# Patient Record
Sex: FEMALE | Race: WHITE | NOT HISPANIC OR LATINO | Employment: OTHER | URBAN - NONMETROPOLITAN AREA
[De-identification: names, ages, dates, MRNs, and addresses within clinical notes are randomized per-mention and may not be internally consistent; named-entity substitution may affect disease eponyms.]

---

## 2017-01-25 RX ORDER — PROMETHAZINE HYDROCHLORIDE 25 MG/1
TABLET ORAL
Qty: 30 TABLET | Refills: 0 | Status: SHIPPED | OUTPATIENT
Start: 2017-01-25 | End: 2017-06-14 | Stop reason: SDUPTHER

## 2017-02-14 ENCOUNTER — OFFICE VISIT (OUTPATIENT)
Dept: FAMILY MEDICINE CLINIC | Facility: CLINIC | Age: 77
End: 2017-02-14

## 2017-02-14 VITALS
WEIGHT: 162.6 LBS | DIASTOLIC BLOOD PRESSURE: 62 MMHG | SYSTOLIC BLOOD PRESSURE: 136 MMHG | OXYGEN SATURATION: 97 % | HEART RATE: 81 BPM | TEMPERATURE: 97.5 F | HEIGHT: 66 IN | BODY MASS INDEX: 26.13 KG/M2

## 2017-02-14 DIAGNOSIS — G47.00 INSOMNIA, UNSPECIFIED TYPE: ICD-10-CM

## 2017-02-14 DIAGNOSIS — Z87.898 HISTORY OF FATIGUE: ICD-10-CM

## 2017-02-14 DIAGNOSIS — E78.00 PURE HYPERCHOLESTEROLEMIA: Primary | ICD-10-CM

## 2017-02-14 DIAGNOSIS — R11.0 NAUSEA: ICD-10-CM

## 2017-02-14 DIAGNOSIS — L82.1 SK (SEBORRHEIC KERATOSIS): ICD-10-CM

## 2017-02-14 PROCEDURE — 90732 PPSV23 VACC 2 YRS+ SUBQ/IM: CPT | Performed by: FAMILY MEDICINE

## 2017-02-14 PROCEDURE — 80053 COMPREHEN METABOLIC PANEL: CPT | Performed by: FAMILY MEDICINE

## 2017-02-14 PROCEDURE — 80061 LIPID PANEL: CPT | Performed by: FAMILY MEDICINE

## 2017-02-14 PROCEDURE — 85027 COMPLETE CBC AUTOMATED: CPT | Performed by: FAMILY MEDICINE

## 2017-02-14 PROCEDURE — 90471 IMMUNIZATION ADMIN: CPT | Performed by: FAMILY MEDICINE

## 2017-02-14 PROCEDURE — 84443 ASSAY THYROID STIM HORMONE: CPT | Performed by: FAMILY MEDICINE

## 2017-02-14 PROCEDURE — 99214 OFFICE O/P EST MOD 30 MIN: CPT | Performed by: FAMILY MEDICINE

## 2017-02-15 LAB
ALBUMIN SERPL-MCNC: 3.9 G/DL (ref 3.4–4.8)
ALBUMIN/GLOB SERPL: 1.3 G/DL (ref 1.1–1.8)
ALP SERPL-CCNC: 71 U/L (ref 38–126)
ALT SERPL W P-5'-P-CCNC: 26 U/L (ref 9–52)
ANION GAP SERPL CALCULATED.3IONS-SCNC: 10 MMOL/L (ref 5–15)
ARTICHOKE IGE QN: 132 MG/DL (ref 1–129)
AST SERPL-CCNC: 17 U/L (ref 14–36)
BILIRUB SERPL-MCNC: 0.3 MG/DL (ref 0.2–1.3)
BUN BLD-MCNC: 15 MG/DL (ref 7–21)
BUN/CREAT SERPL: 17.2 (ref 7–25)
CALCIUM SPEC-SCNC: 9.3 MG/DL (ref 8.4–10.2)
CHLORIDE SERPL-SCNC: 100 MMOL/L (ref 95–110)
CHOLEST SERPL-MCNC: 234 MG/DL (ref 0–199)
CO2 SERPL-SCNC: 31 MMOL/L (ref 22–31)
CREAT BLD-MCNC: 0.87 MG/DL (ref 0.5–1)
DEPRECATED RDW RBC AUTO: 40.1 FL (ref 36.4–46.3)
ERYTHROCYTE [DISTWIDTH] IN BLOOD BY AUTOMATED COUNT: 12.5 % (ref 11.5–14.5)
GFR SERPL CREATININE-BSD FRML MDRD: 63 ML/MIN/1.73 (ref 39–90)
GLOBULIN UR ELPH-MCNC: 3.1 GM/DL (ref 2.3–3.5)
GLUCOSE BLD-MCNC: 108 MG/DL (ref 60–100)
HCT VFR BLD AUTO: 40.6 % (ref 35–45)
HDLC SERPL-MCNC: 51 MG/DL (ref 60–200)
HGB BLD-MCNC: 13.3 G/DL (ref 12–15.5)
LDLC/HDLC SERPL: 3 {RATIO} (ref 0–3.22)
MCH RBC QN AUTO: 28.7 PG (ref 26.5–34)
MCHC RBC AUTO-ENTMCNC: 32.8 G/DL (ref 31.4–36)
MCV RBC AUTO: 87.7 FL (ref 80–98)
PLATELET # BLD AUTO: 249 10*3/MM3 (ref 150–450)
PMV BLD AUTO: 10.4 FL (ref 8–12)
POTASSIUM BLD-SCNC: 4.6 MMOL/L (ref 3.5–5.1)
PROT SERPL-MCNC: 7 G/DL (ref 6.3–8.6)
RBC # BLD AUTO: 4.63 10*6/MM3 (ref 3.77–5.16)
SODIUM BLD-SCNC: 141 MMOL/L (ref 137–145)
TRIGL SERPL-MCNC: 151 MG/DL (ref 20–199)
TSH SERPL DL<=0.05 MIU/L-ACNC: 3.33 MIU/ML (ref 0.46–4.68)
WBC NRBC COR # BLD: 6.92 10*3/MM3 (ref 3.2–9.8)

## 2017-02-15 NOTE — PROGRESS NOTES
Subjective   Sara Solsi is a 76 y.o. female.     History of Present Illness     Past 2-3 weeks, for example, if she gets up to cook she will get tired.  Not sob, no chest pain, just tired.  Cant attribute anything different over past 2-3 weeks.  She took herself off of amitritpylline.  She stays nauseated.  She has called dr gil alas office and they wont call her back?  Has place left upper leg that has turned dark.   She says she is sleeping pretty good.  Only gets up once to urinate.    Review of Systems   Constitutional: Positive for fatigue. Negative for chills and fever.   HENT: Negative for congestion, ear discharge, ear pain, facial swelling, hearing loss, postnasal drip, rhinorrhea, sinus pressure, sore throat, trouble swallowing and voice change.    Eyes: Negative for discharge, redness and visual disturbance.   Respiratory: Negative for cough, chest tightness, shortness of breath and wheezing.    Cardiovascular: Negative for chest pain and palpitations.   Gastrointestinal: Negative for abdominal pain, blood in stool, constipation, diarrhea, nausea and vomiting.   Endocrine: Negative for polydipsia and polyuria.   Genitourinary: Negative for dysuria, flank pain, hematuria and urgency.   Musculoskeletal: Negative for arthralgias, back pain, joint swelling and myalgias.   Skin: Negative for rash.   Neurological: Negative for dizziness, weakness, numbness and headaches.   Hematological: Negative for adenopathy.   Psychiatric/Behavioral: Negative for confusion and sleep disturbance. The patient is not nervous/anxious.        Objective   Physical Exam   Constitutional: She is oriented to person, place, and time. She appears well-developed and well-nourished.   HENT:   Head: Normocephalic and atraumatic.   Right Ear: External ear normal.   Left Ear: External ear normal.   Nose: Nose normal.   Mouth/Throat: Oropharynx is clear and moist.   Eyes: Conjunctivae and EOM are normal. Pupils are equal,  round, and reactive to light.   Neck: Normal range of motion. Neck supple.   Cardiovascular: Normal rate, regular rhythm and normal heart sounds.  Exam reveals no gallop and no friction rub.    No murmur heard.  Pulmonary/Chest: Effort normal and breath sounds normal.   Abdominal: Soft. Bowel sounds are normal. She exhibits no distension. There is no tenderness. There is no rebound and no guarding.   Musculoskeletal: Normal range of motion. She exhibits no edema or deformity.   Neurological: She is alert and oriented to person, place, and time. No cranial nerve deficit.   Skin: Skin is warm and dry. No rash noted. No erythema.   Stuck on appearing hyperpigmented papule left upper anterior thigh.   Psychiatric: She has a normal mood and affect. Her behavior is normal. Judgment and thought content normal.   Nursing note and vitals reviewed.      Assessment/Plan   Sara was seen today for fatigue and nevus.    Diagnoses and all orders for this visit:    Pure hypercholesterolemia  -     Lipid Panel    Nausea  -     CBC (No Diff)  -     Comprehensive Metabolic Panel  -     TSH    History of fatigue  -     CBC (No Diff)  -     Comprehensive Metabolic Panel  -     Lipid Panel  -     TSH    Insomnia, unspecified type  -     TSH    SK (seborrheic keratosis)    Other orders  -     Pneumococcal Polysaccharide Vaccine 23-Valent Greater Than or Equal To 1yo Subcutaneous / IM      sk is benign, reassurance given  labwork  Marked meds on her list that can cause nausea.

## 2017-03-16 DIAGNOSIS — R42 VERTIGO: Primary | ICD-10-CM

## 2017-03-30 ENCOUNTER — TELEPHONE (OUTPATIENT)
Dept: FAMILY MEDICINE CLINIC | Facility: CLINIC | Age: 77
End: 2017-03-30

## 2017-03-30 RX ORDER — MECLIZINE HYDROCHLORIDE 25 MG/1
25 TABLET ORAL 3 TIMES DAILY PRN
Qty: 90 TABLET | Refills: 11 | Status: SHIPPED | OUTPATIENT
Start: 2017-03-30 | End: 2018-01-03 | Stop reason: SINTOL

## 2017-03-30 NOTE — TELEPHONE ENCOUNTER
----- Message from Pascale Pereira sent at 3/29/2017  4:13 PM CDT -----  REFILL  ANTIVERT 25 MG 1/2-1 TIERNESTO IQBAL

## 2017-04-06 ENCOUNTER — HOSPITAL ENCOUNTER (OUTPATIENT)
Dept: PHYSICAL THERAPY | Facility: HOSPITAL | Age: 77
Setting detail: THERAPIES SERIES
Discharge: HOME | End: 2017-04-06

## 2017-04-06 DIAGNOSIS — R42 VERTIGO: Primary | ICD-10-CM

## 2017-04-06 PROCEDURE — G8978 MOBILITY CURRENT STATUS: HCPCS

## 2017-04-06 PROCEDURE — G8979 MOBILITY GOAL STATUS: HCPCS

## 2017-04-06 PROCEDURE — 97161 PT EVAL LOW COMPLEX 20 MIN: CPT

## 2017-04-06 NOTE — PROGRESS NOTES
Outpatient Physical Therapy Ortho Initial Evaluation  [facility]  Sulaiman Burns, PT  17  3:58 PM       Patient Name: Sara Solis  : 1940  MRN: [id number]  Today's Date: 2017      Visit Date: 2017     Subjective Improvement: N/A       Attendance:   Approved: Awaiting Insurance Auth          MD follow up: Ask next visit           RC date: 17           Patient Active Problem List   Diagnosis   • Low back pain   • Actinic keratosis   • Shoulder pain, left   • Pure hypercholesterolemia   • Nausea   • History of fatigue   • Insomnia        Past Medical History:   Diagnosis Date   • Actinic keratosis    • Allergic rhinitis    • Altered bowel function    • Anxiety state    • Aptyalism    • Attention deficit hyperactivity disorder    • Cough    • Degenerative joint disease involving multiple joints    • Depressive disorder    • Diarrhea    • Disorder of skin    • Fissure in skin    • Foot pain    • Generalized anxiety disorder    • Herpes labialis    • History of bone scan 10/01/2008    DXA BONE DENSITY AXIAL 59132 (2) - Osteopenia of the lumbar spine, which has improved compared to the prior study. Normal bone mineral density of the hips, which has improved compared to the prior study   • History of colonoscopy 2011    Colon endoscopy 54122 (2) - Diverticulosis found in sigmoid colon. Moderate fixation of sigmoid colon. Internal hemorrhoids found in anus.    • History of esophagogastroduodenoscopy 2011    EGD w/ tube 69036 (1) - Normal hypopharynx, GE junction, duodenum, symmetrical & patent pylorus. Normal esophagus. Dilatation performed. Chronic gastritis found in antrum. Biopsy taken.   • History of mammogram 10/01/2008    Mammogram, both breasts (1) - Negative mammogram. Recommend follow-up in 12 months;     • Incontinence of feces    • Increased frequency of urination    • Insomnia    • Irritable bowel syndrome    • Knee pain    • Lipoma of  shoulder    • Low back pain    • Lumbosacral radiculopathy    • Nausea    • Nervous system abnormality     Disorder of the peripheral nervous system     • Osteopenia    • Pain in limb    • Pain in lower limb    • Plantar fasciitis    • Pure hypercholesterolemia    • Restless legs    • Skin lesion     nose   • Spasm    • Spasm of back muscles    • Squamous cell carcinoma of skin    • Stress fracture    • Tear film insufficiency    • Trochanteric bursitis of right hip    • Upper respiratory infection    • Urinary tract infectious disease    • Vertigo         Past Surgical History:   Procedure Laterality Date   • BREAST BIOPSY  12/04/1995    BIOPSY OF BREAST OPEN 73571 (2) - Right,Exscision of mass.Fibroadenoma. Fibrocystic disease, proliferative   • CATARACT EXTRACTION WITH INTRAOCULAR LENS IMPLANT Right 03/07/2006    Remove cataract, insert lens (1) - right   • DILATATION AND CURETTAGE  10/02/1986    D&C (1) - Fractional D&C laparoscopic tubal sterilization. Plus uterine fibroids, approximately 10-12 weeks in size   • FOOT SURGERY  03/20/1990    Foot/toes surgery procedure (1) - Left,Excisional biopsy. Tumor, supposed fibroma, 5th toe   • INJECTION OF MEDICATION  06/08/2015    Kenalog (2) - SERENA Robert   • LIVER BIOPSY  04/26/2000    Needle biopsy of liver (1) - percutaneous liver biopsy;     • OTHER SURGICAL HISTORY  10/17/2002    Chest procedure (2) - Intralesional injection of keloid of chest    • OTHER SURGICAL HISTORY  10/25/2001    Remove axillary lymph nodes (1) - Biopsy, Left axillary adenopathy. 4 lymph nodes with reactive hyperplasia No tumor seen   • OTHER SURGICAL HISTORY  06/17/2013    Biopsy, Each Additional Lesion 82580 (1) - SERENA Robert   • OTHER SURGICAL HISTORY  07/14/2014    Destruction of Premalignant Lesion (1st) 31174 (2)  - SERENA Robert   • OTHER SURGICAL HISTORY  05/10/2016    Drain/Inject Major Joint 02491 (4) - SERENA Robert   • SKIN BIOPSY  07/28/2014    Biopsy of Skin 82430 (2)  - SERENA Robert   • TOTAL ABDOMINAL  HYSTERECTOMY WITH SALPINGO OOPHORECTOMY  09/25/1990    incidental appendectomy.Uterine fibroids       Current Outpatient Prescriptions:   •  aspirin 81 MG chewable tablet, Chew 81 mg Daily., Disp: , Rfl:   •  Calcium Carb-Cholecalciferol (CALCIUM + D3 PO), Take  by mouth Daily., Disp: , Rfl:   •  cholecalciferol (VITAMIN D3) 1000 UNITS tablet, Take 1,000 Units by mouth Daily., Disp: , Rfl:   •  FERROUS SULFATE PO, Take  by mouth Daily., Disp: , Rfl:   •  fexofenadine (ALLEGRA) 180 MG tablet, Take 180 mg by mouth Every Night., Disp: , Rfl:   •  gabapentin (NEURONTIN) 600 MG tablet, 1/2 to one tablet tid prn., Disp: 270 tablet, Rfl: 3  •  ibuprofen (ADVIL,MOTRIN) 600 MG tablet, Take 600 mg by mouth Daily., Disp: , Rfl:   •  Magnesium 100 MG capsule, Take 400 mg by mouth Daily., Disp: , Rfl:   •  meclizine (ANTIVERT) 25 MG tablet, Take 1 tablet by mouth 3 (Three) Times a Day As Needed for dizziness., Disp: 90 tablet, Rfl: 11  •  Multiple Vitamins-Minerals (CENTRUM SILVER PO), Take  by mouth Daily., Disp: , Rfl:   •  omeprazole (PRILOSEC) 20 MG capsule, Take 1 capsule by mouth 2 (Two) Times a Day., Disp: 180 capsule, Rfl: 3  •  pramipexole (MIRAPEX) 0.5 MG tablet, Take  by mouth. Take one tablet 1 hour before bedtime., Disp: , Rfl:   •  promethazine (PHENERGAN) 25 MG tablet, TAKE 1 TABLET BY MOUTH EVERY 6 (SIX) HOURS AS NEEDED FOR NAUSEA OR VOMITING * MAY CAUSE DROWSINESS., Disp: 30 tablet, Rfl: 0  •  traMADol (ULTRAM) 50 MG tablet, Take 1 tablet by mouth Every 8 (Eight) Hours As Needed for moderate pain (4-6)., Disp: 90 tablet, Rfl: 2  •  valACYclovir (VALTREX) 1000 MG tablet, Take 2 tablets by mouth 2 (Two) Times a Day., Disp: 4 tablet, Rfl: 11    No Known Allergies      Visit Dx:     ICD-10-CM ICD-9-CM   1. Vertigo R42 780.4             Patient History       04/06/17 1500          History    Chief Complaint Difficulty Walking;Difficulty with daily activities;Dizziness  -MD      Date Current Problem(s) Began 03/15/17   "-MD      Brief Description of Current Complaint Really dizzy every morning when she gets up, espcially worse in the am and has to hold onto the wall for a while in the am. Reports that when she turns on her R side, feels like her stomach flips, causes pain, but passes real quick. Reports that she cannot focus, almost like she is in a \"daze.\" Been really bad for the last 3 weeks or so but has been going on for sometime. Reports mild dizziness at this time, reports that she can't focus exactly like she wants to at this time. Patient reports that she has had no BP issue at this time.   -MD      Onset Date- PT 4/6/17  -MD      Patient/Caregiver Goals Return to prior level of function;Improve mobility;Relief from dizziness  -MD      Current Tobacco Use Never  -MD      Smoking Status Never  -MD      Patient's Rating of General Health Good  -MD      Occupation/sports/leisure activities Retired. Hobbies: read, crossword puzzle.   -MD      Results of Clinical Tests None recently.   -MD      Pain     Pain Location Back   Has been hurting, but not bad. Has a mild headache today.   -MD      Pain at Present 0  -MD      Pain at Best 0  -MD      Pain at Worst 0  -MD      What Performance Factors Make the Current Problem(s) WORSE? Getting up too fast, worse in the morning.   -MD      What Performance Factors Make the Current Problem(s) BETTER? Rest and time.   -MD      Is your sleep disturbed? Yes  -MD      Is medication used to assist with sleep? No  -MD      Difficulties with ADL's? Cannot do anything too fast at this time.   -MD      Fall Risk Assessment    Any falls in the past year: Yes  -MD      Number of falls reported in the last 12 months 2  -MD      Does patient have a fear of falling No  -MD        User Key  (r) = Recorded By, (t) = Taken By, (c) = Cosigned By    Initials Name Provider Type    MD Sulaiman Burns, PT Physical Therapist                PT Ortho       04/06/17 1500    Subjective Comments    Subjective " Comments Reporst mild dizziness today, not bad, and also has a mild headache.   -MD    Precautions and Contraindications    Precautions/Limitations no known precautions/limitations  -MD    Subjective Pain    Able to rate subjective pain? yes  -MD    Pre-Treatment Pain Level 4  -MD    Subjective Pain Comment Headache  -MD    Posture/Observations    Posture- WNL Posture is WNL  -MD    Posture/Observations Comments Positive Upbeat nystagmus with [place] to R, Epley was performed.   -MD    Sensation    Sensation WNL? WNL  -MD    Special Tests/Palpation    Special Tests/Palpation --   Finger to Nose Test (-)  -MD    Neck    Flexion AROM Deficit 40  -MD    Extension AROM Deficit 40  -MD    Lt Lat Flexion AROM Deficit 22  -MD    Rt Lateral Flexion AROM Deficit 22  -MD    Lt Rotation AROM Deficit 64  -MD    Rt Rotation AROM Deficit 54  -MD    MMT (Manual Muscle Testing)    General MMT Assessment Detail 5/5 B LE grossly throughout.   -MD    Balance Skills Training    Training Strategies (Balance Skills) VOR 1 30 secs, mild dizziness, Ft apart/EC 1 min, Ft tog/EC 20 secs   -MD      User Key  (r) = Recorded By, (t) = Taken By, (c) = Cosigned By    Initials Name Provider Type    MD Sulaiman Burns, PT Physical Therapist                            Therapy Education       04/06/17 1500          Therapy Education    Given HEP;Symptoms/condition management;Posture/body mechanics;Pain management;Fall prevention and home safety  -MD      Program New  -MD      How Provided Verbal;Demonstration  -MD      Provided to Patient  -MD      Level of Understanding Teach back education performed;Verbalized;Demonstrated  -MD        User Key  (r) = Recorded By, (t) = Taken By, (c) = Cosigned By    Initials Name Provider Type    MD Sulaiman Burns, PT Physical Therapist                PT OP Goals       04/06/17 1500       PT Short Term Goals    STG Date to Achieve 04/27/17  -MD     STG 1 EC/ft tog to 30 secs or greater.   -MD     STG 2  Full treatment session, no dizziness noted.   -MD     STG 3 Able to wake up in the am and not have to grab onto something immediately after standing up.   -MD     Long Term Goals    LTG Date to Achieve 04/27/17  -MD     LTG 1 EC/ft tog 1 min or greater.   -MD     LTG 2 Able to go a full day without dizziness.   -MD     LTG 3 I with HEP.   -MD     LTG 4 Able to go a full week without dizziness.   -MD     Time Calculation    PT Goal Re-Cert Due Date 04/27/17   Visits 1/1, MD ask next visit.   -MD       User Key  (r) = Recorded By, (t) = Taken By, (c) = Cosigned By    Initials Name Provider Type    MD Sulaiman Burns, PT Physical Therapist                PT Assessment/Plan       04/06/17 1500       PT Assessment    Functional Limitations Decreased safety during functional activities;Impaired gait;Limitation in home management;Limitations in functional capacity and performance;Limitations in community activities;Performance in leisure activities;Performance in self-care ADL  -MD     Impairments Balance;Gait  -MD     Assessment Comments Patient demos mild vertigo at this time, positive nystagmus noted with R [place]  Gerlach, patient was rolled to the L with Epley, patients veritgo did not change much after treatment but told to monitor closely.   -MD     Rehab Potential Good  -MD     Patient/caregiver participated in establishment of treatment plan and goals Yes  -MD     Patient would benefit from skilled therapy intervention Yes  -MD     PT Plan    PT Frequency 2x/week  -MD     Predicted Duration of Therapy Intervention (days/wks) 6 weeks  -MD     Planned CPT's? PT EVAL LOW COMPLEXITY: 73941;PT RE-EVAL: 65461;PT THER PROC EA 15 MIN: 50048;PT THER ACT EA 15 MIN: 74790;PT MANUAL THERAPY EA 15 MIN: 29370;PT GAIT TRAINING EA 15 MIN: 48564;PT NEUROMUSC RE-EDUCATION EA 15 MIN: 48136;PT AQUATIC THERAPY EA 15 MIN: 99930;PT HOT OR COLD PACK TREAT ALICIA  -MD     Physical Therapy Interventions (Optional Details) balance  training;home exercise program;strengthening;stretching  -MD     PT Plan Comments Epley as needed, balance activities, education, head movement activities, HEP.   -MD       User Key  (r) = Recorded By, (t) = Taken By, (c) = Cosigned By    Initials Name Provider Type    MD Sulaiman Burns, PT Physical Therapist                  Exercises       04/06/17 1500          Subjective Comments    Subjective Comments Reporst mild dizziness today, not bad, and also has a mild headache.   -MD      Subjective Pain    Able to rate subjective pain? yes  -MD      Pre-Treatment Pain Level 4  -MD      Subjective Pain Comment Headache  -MD        User Key  (r) = Recorded By, (t) = Taken By, (c) = Cosigned By    Initials Name Provider Type    MD Sulaiman Burns, PT Physical Therapist           Manual Rx (last 36 hours)      Manual Treatments       04/06/17 1500          Manual Rx 1    Manual Rx 1 Location Epley  -MD      Manual Rx 1 Type Epley Maneuver, Jeremy  Pine to the R, rolled to the L.   -MD      Manual Rx 1 Grade Upbeat noted with Jeremy [place] to the R.   -MD      Manual Rx 1 Duration x1  -MD        User Key  (r) = Recorded By, (t) = Taken By, (c) = Cosigned By    Initials Name Provider Type    MD Sulaiman Burns, PT Physical Therapist                            Time Calculation:   Start Time: 1515  Stop Time: 1550  Time Calculation (min): 35 min     Therapy Charges for Today     Code Description Service Date Service Provider Modifiers Qty    72917275371 HC PT MOBILITY CURRENT 4/6/2017 Sulaiman Burns, PT GP, CK 1    39523484966 HC PT MOBILITY PROJECTED 4/6/2017 Sulaiman Burns, PT GP, CI 1    [id number] HC PT EVAL LOW COMPLEXITY 2 4/6/2017 Sulaiman Burns, PT GP 1          PT G-Codes  Functional Limitation: Mobility: Walking and moving around  Mobility: Walking and Moving Around Current Status (): At least 40 percent but less than 60 percent impaired, limited or restricted  Mobility: Walking and Moving Around  Goal Status (): At least 1 percent but less than 20 percent impaired, limited or restricted         Sulaiman Burns, PT  4/6/2017

## 2017-04-10 ENCOUNTER — HOSPITAL ENCOUNTER (OUTPATIENT)
Dept: PHYSICAL THERAPY | Facility: HOSPITAL | Age: 77
Setting detail: THERAPIES SERIES
Discharge: HOME | End: 2017-04-10

## 2017-04-10 DIAGNOSIS — R42 VERTIGO: Primary | ICD-10-CM

## 2017-04-10 PROCEDURE — 97110 THERAPEUTIC EXERCISES: CPT

## 2017-04-10 NOTE — PROGRESS NOTES
Outpatient Physical Therapy Ortho Treatment Note   Brayan Logan     Patient Name: Sara Solis  : 1940  MRN: [id number]  Today's Date: 4/10/2017      Visit Date: 04/10/2017     Subjective Improvement:    0%   Attendance:   Approved:   Visits approved thru 17        MD follow up:   prn        RC date:   17      Visit Dx:    ICD-10-CM ICD-9-CM   1. Vertigo R42 780.4       Patient Active Problem List   Diagnosis   • Low back pain   • Actinic keratosis   • Shoulder pain, left   • Pure hypercholesterolemia   • Nausea   • History of fatigue   • Insomnia        Past Medical History:   Diagnosis Date   • Actinic keratosis    • Allergic rhinitis    • Altered bowel function    • Anxiety state    • Aptyalism    • Attention deficit hyperactivity disorder    • Cough    • Degenerative joint disease involving multiple joints    • Depressive disorder    • Diarrhea    • Disorder of skin    • Fissure in skin    • Foot pain    • Generalized anxiety disorder    • Herpes labialis    • History of bone scan 10/01/2008    DXA BONE DENSITY AXIAL 50364 (2) - Osteopenia of the lumbar spine, which has improved compared to the prior study. Normal bone mineral density of the hips, which has improved compared to the prior study   • History of colonoscopy 2011    Colon endoscopy 26931 (2) - Diverticulosis found in sigmoid colon. Moderate fixation of sigmoid colon. Internal hemorrhoids found in anus.    • History of esophagogastroduodenoscopy 2011    EGD w/ tube 19614 (1) - Normal hypopharynx, GE junction, duodenum, symmetrical & patent pylorus. Normal esophagus. Dilatation performed. Chronic gastritis found in antrum. Biopsy taken.   • History of mammogram 10/01/2008    Mammogram, both breasts (1) - Negative mammogram. Recommend follow-up in 12 months;     • Incontinence of feces    • Increased frequency of urination    • Insomnia    • Irritable bowel syndrome    • Knee pain    • Lipoma of  shoulder    • Low back pain    • Lumbosacral radiculopathy    • Nausea    • Nervous system abnormality     Disorder of the peripheral nervous system     • Osteopenia    • Pain in limb    • Pain in lower limb    • Plantar fasciitis    • Pure hypercholesterolemia    • Restless legs    • Skin lesion     nose   • Spasm    • Spasm of back muscles    • Squamous cell carcinoma of skin    • Stress fracture    • Tear film insufficiency    • Trochanteric bursitis of right hip    • Upper respiratory infection    • Urinary tract infectious disease    • Vertigo         Past Surgical History:   Procedure Laterality Date   • BREAST BIOPSY  12/04/1995    BIOPSY OF BREAST OPEN 28079 (2) - Right,Exscision of mass.Fibroadenoma. Fibrocystic disease, proliferative   • CATARACT EXTRACTION WITH INTRAOCULAR LENS IMPLANT Right 03/07/2006    Remove cataract, insert lens (1) - right   • DILATATION AND CURETTAGE  10/02/1986    D&C (1) - Fractional D&C laparoscopic tubal sterilization. Plus uterine fibroids, approximately 10-12 weeks in size   • FOOT SURGERY  03/20/1990    Foot/toes surgery procedure (1) - Left,Excisional biopsy. Tumor, supposed fibroma, 5th toe   • INJECTION OF MEDICATION  06/08/2015    Kenalog (2) - SERENA Robert   • LIVER BIOPSY  04/26/2000    Needle biopsy of liver (1) - percutaneous liver biopsy;     • OTHER SURGICAL HISTORY  10/17/2002    Chest procedure (2) - Intralesional injection of keloid of chest    • OTHER SURGICAL HISTORY  10/25/2001    Remove axillary lymph nodes (1) - Biopsy, Left axillary adenopathy. 4 lymph nodes with reactive hyperplasia No tumor seen   • OTHER SURGICAL HISTORY  06/17/2013    Biopsy, Each Additional Lesion 64856 (1) - SERENA Robert   • OTHER SURGICAL HISTORY  07/14/2014    Destruction of Premalignant Lesion (1st) 14229 (2)  - SERENA Robert   • OTHER SURGICAL HISTORY  05/10/2016    Drain/Inject Major Joint 81302 (4) - SERENA Robert   • SKIN BIOPSY  07/28/2014    Biopsy of Skin 09824 (2)  - SERENA Robert   • TOTAL ABDOMINAL  HYSTERECTOMY WITH SALPINGO OOPHORECTOMY  09/25/1990    incidental appendectomy.Uterine fibroids             PT Ortho       04/10/17 1500    Subjective Pain    Subjective Pain Comment back pain  -TM    Posture/Observations    Posture/Observations Comments No nystagmus with jeremy hallpike  -TM    Balance Skills Training    Training Strategies (Balance Skills) VOR 30 secs, FT apart EC 1 min, Airex feet tog EO 30 sec  -TM      04/10/17 1400    Subjective Comments    Subjective Comments Reports that dizziness was increased after initial eval.  Had to cancel appt for her back on 4--07-17.  -TM    Precautions and Contraindications    Precautions/Limitations no known precautions/limitations  -TM    Subjective Pain    Able to rate subjective pain? yes  -TM    Pre-Treatment Pain Level 3  -TM      User Key  (r) = Recorded By, (t) = Taken By, (c) = Cosigned By    Initials Name Provider Type    TM Debra Luque, PTA Physical Therapy Assistant                            PT Assessment/Plan       04/10/17 1500       PT Assessment    Functional Limitations Decreased safety during functional activities;Impaired gait;Limitation in home management;Limitations in functional capacity and performance;Limitations in community activities;Performance in leisure activities;Performance in self-care ADL  -TM     Impairments Balance;Gait  -TM     Assessment Comments Pt had no nystagmus with Jeremy-Hallpike or Epley maneuver.  Did have dizziness with each positional change, buyt resolved within 30 secs. No reports of nausea.  -TM     Rehab Potential Good  -TM     Patient/caregiver participated in establishment of treatment plan and goals Yes  -TM     Patient would benefit from skilled therapy intervention Yes  -TM     PT Plan    PT Frequency 2x/week  -TM     Predicted Duration of Therapy Intervention (days/wks) 6 weeks  -TM     PT Plan Comments Cont balance activities, Epley as needed.  -TM       User Key  (r) = Recorded By, (t) = Taken By, (c) =  Cosigned By    Initials Name Provider Type    TM Debra Luque PTA Physical Therapy Assistant                    Exercises       04/10/17 1500 04/10/17 1400       Subjective Comments    Subjective Comments  Reports that dizziness was increased after initial eval.  Had to cancel appt for her back on 4--07-17.  -TM     Subjective Pain    Able to rate subjective pain?  yes  -TM     Pre-Treatment Pain Level  3  -TM     Subjective Pain Comment back pain  -TM      Exercise 1    Exercise Name 1 Epley maneuver for R repositioning  -TM      Reps 1 2  -TM      Exercise 2    Exercise Name 2 VOR smooth pursuit vertical  -TM      Time (Seconds) 2 30  -TM      Exercise 3    Exercise Name 3 VOR smooth pursuit horizontal  -TM      Time (Seconds) 3 30  -TM      Exercise 4    Exercise Name 4 sit to stands with ext  -TM      Sets 4 2  -TM      Reps 4 10  -TM      Exercise 5    Exercise Name 5 ft tog/EC stance  -TM      Reps 5 2  -TM      Time (Seconds) 5 30  -TM      Exercise 6    Exercise Name 6 split stance  -TM      Reps 6 2  -TM      Time (Seconds) 6 30  -TM      Exercise 7    Exercise Name 7 Airex stance Ft Tog/EO  -TM      Reps 7 2  -TM      Time (Seconds) 7 30  -TM        User Key  (r) = Recorded By, (t) = Taken By, (c) = Cosigned By    Initials Name Provider Type     Debra Luque PTA Physical Therapy Assistant                               PT OP Goals       04/10/17 1500       PT Short Term Goals    STG Date to Achieve 04/27/17  -TM     STG 1 EC/ft tog to 30 secs or greater.   -TM     STG 1 Progress Met  -TM     STG 2 Full treatment session, no dizziness noted.   -TM     STG 2 Progress Not Met  -TM     STG 3 Able to wake up in the am and not have to grab onto something immediately after standing up.   -TM     STG 3 Progress Not Met  -TM     Long Term Goals    LTG Date to Achieve 04/27/17  -TM     LTG 1 EC/ft tog 1 min or greater.   -TM     LTG 1 Progress Not Met  -TM     LTG 2 Able to go a full day without  dizziness.   -TM     LTG 2 Progress Not Met  -TM     LTG 3 I with HEP.   -TM     LTG 3 Progress Progressing  -TM     LTG 4 Able to go a full week without dizziness.   -TM     LTG 4 Progress Not Met  -TM       User Key  (r) = Recorded By, (t) = Taken By, (c) = Cosigned By    Initials Name Provider Type    TM Debra Luque PTA Physical Therapy Assistant                    Time Calculation:   Start Time: 1435  Stop Time: 1520  Time Calculation (min): 45 min  Total Timed Code Minutes- PT: 45 minute(s)    Therapy Charges for Today     Code Description Service Date Service Provider Modifiers Qty    22346567399 HC PT THER PROC EA 15 MIN 4/10/2017 Debra Luque PTA GP 3                    Debra Luque PTA  4/10/2017

## 2017-04-13 ENCOUNTER — HOSPITAL ENCOUNTER (OUTPATIENT)
Dept: PHYSICAL THERAPY | Facility: HOSPITAL | Age: 77
Setting detail: THERAPIES SERIES
Discharge: HOME | End: 2017-04-13

## 2017-04-13 DIAGNOSIS — R42 VERTIGO: Primary | ICD-10-CM

## 2017-04-13 PROCEDURE — 97110 THERAPEUTIC EXERCISES: CPT

## 2017-04-13 NOTE — PROGRESS NOTES
Outpatient Physical Therapy Ortho Treatment Note   Brayan Logan  Sulaiman Burns, PT  17  8:37 AM       Patient Name: Sara Solis  : 1940  MRN: [id number]  Today's Date: 2017      Visit Date: 2017     Subjective Improvement: N/A      Attendance: 3/3  Approved: Medicare (17-17)           MD follow up: PRN          RC date: 17           Visit Dx:    ICD-10-CM ICD-9-CM   1. Vertigo R42 780.4       Patient Active Problem List   Diagnosis   • Low back pain   • Actinic keratosis   • Shoulder pain, left   • Pure hypercholesterolemia   • Nausea   • History of fatigue   • Insomnia        Past Medical History:   Diagnosis Date   • Actinic keratosis    • Allergic rhinitis    • Altered bowel function    • Anxiety state    • Aptyalism    • Attention deficit hyperactivity disorder    • Cough    • Degenerative joint disease involving multiple joints    • Depressive disorder    • Diarrhea    • Disorder of skin    • Fissure in skin    • Foot pain    • Generalized anxiety disorder    • Herpes labialis    • History of bone scan 10/01/2008    DXA BONE DENSITY AXIAL 15461 (2) - Osteopenia of the lumbar spine, which has improved compared to the prior study. Normal bone mineral density of the hips, which has improved compared to the prior study   • History of colonoscopy 2011    Colon endoscopy 75293 (2) - Diverticulosis found in sigmoid colon. Moderate fixation of sigmoid colon. Internal hemorrhoids found in anus.    • History of esophagogastroduodenoscopy 2011    EGD w/ tube 25857 (1) - Normal hypopharynx, GE junction, duodenum, symmetrical & patent pylorus. Normal esophagus. Dilatation performed. Chronic gastritis found in antrum. Biopsy taken.   • History of mammogram 10/01/2008    Mammogram, both breasts (1) - Negative mammogram. Recommend follow-up in 12 months;     • Incontinence of feces    • Increased frequency of urination    • Insomnia    • Irritable  bowel syndrome    • Knee pain    • Lipoma of shoulder    • Low back pain    • Lumbosacral radiculopathy    • Nausea    • Nervous system abnormality     Disorder of the peripheral nervous system     • Osteopenia    • Pain in limb    • Pain in lower limb    • Plantar fasciitis    • Pure hypercholesterolemia    • Restless legs    • Skin lesion     nose   • Spasm    • Spasm of back muscles    • Squamous cell carcinoma of skin    • Stress fracture    • Tear film insufficiency    • Trochanteric bursitis of right hip    • Upper respiratory infection    • Urinary tract infectious disease    • Vertigo         Past Surgical History:   Procedure Laterality Date   • BREAST BIOPSY  12/04/1995    BIOPSY OF BREAST OPEN 86278 (2) - Right,Exscision of mass.Fibroadenoma. Fibrocystic disease, proliferative   • CATARACT EXTRACTION WITH INTRAOCULAR LENS IMPLANT Right 03/07/2006    Remove cataract, insert lens (1) - right   • DILATATION AND CURETTAGE  10/02/1986    D&C (1) - Fractional D&C laparoscopic tubal sterilization. Plus uterine fibroids, approximately 10-12 weeks in size   • FOOT SURGERY  03/20/1990    Foot/toes surgery procedure (1) - Left,Excisional biopsy. Tumor, supposed fibroma, 5th toe   • INJECTION OF MEDICATION  06/08/2015    Kenalog (2) - SERENA Robert   • LIVER BIOPSY  04/26/2000    Needle biopsy of liver (1) - percutaneous liver biopsy;     • OTHER SURGICAL HISTORY  10/17/2002    Chest procedure (2) - Intralesional injection of keloid of chest    • OTHER SURGICAL HISTORY  10/25/2001    Remove axillary lymph nodes (1) - Biopsy, Left axillary adenopathy. 4 lymph nodes with reactive hyperplasia No tumor seen   • OTHER SURGICAL HISTORY  06/17/2013    Biopsy, Each Additional Lesion 68908 (1) - SERENA Robert   • OTHER SURGICAL HISTORY  07/14/2014    Destruction of Premalignant Lesion (1st) 88845 (2)  - SERENA Robert   • OTHER SURGICAL HISTORY  05/10/2016    Drain/Inject Major Joint 60639 (4) - SERENA Robert   • SKIN BIOPSY  07/28/2014    Biopsy of  Skin 06407 (2)  - SERENA Robert   • TOTAL ABDOMINAL HYSTERECTOMY WITH SALPINGO OOPHORECTOMY  09/25/1990    incidental appendectomy.Uterine fibroids             PT Ortho       04/13/17 0800    Posture/Observations    Posture- WNL Posture is WNL  -MD      04/10/17 1500    Subjective Pain    Subjective Pain Comment back pain  -TM    Posture/Observations    Posture/Observations Comments No nystagmus with savannah hallpike  -TM    Balance Skills Training    Training Strategies (Balance Skills) VOR 30 secs, FT apart EC 1 min, Airex feet tog EO 30 sec  -TM      04/10/17 1400    Subjective Comments    Subjective Comments Reports that dizziness was increased after initial eval.  Had to cancel appt for her back on 4--07-17.  -TM    Precautions and Contraindications    Precautions/Limitations no known precautions/limitations  -TM    Subjective Pain    Able to rate subjective pain? yes  -TM    Pre-Treatment Pain Level 3  -TM      User Key  (r) = Recorded By, (t) = Taken By, (c) = Cosigned By    Initials Name Provider Type    MD Sulaiman Burns, PT Physical Therapist    TM Debra Luque, PTA Physical Therapy Assistant                            PT Assessment/Plan       04/13/17 0800       PT Assessment    Functional Limitations Decreased safety during functional activities;Impaired gait;Limitation in home management;Limitations in community activities;Limitations in functional capacity and performance;Performance in leisure activities;Performance in self-care ADL  -MD     Impairments Balance;Gait  -MD     Assessment Comments Minimal nystagmus today noted with savannah hallpike, patient tolerates all ther ex very well, needs min cues with MS at this time.   -MD     Rehab Potential Good  -MD     Patient/caregiver participated in establishment of treatment plan and goals Yes  -MD     Patient would benefit from skilled therapy intervention Yes  -MD     PT Plan    PT Frequency 2x/week  -MD     Predicted Duration of Therapy Intervention  (days/wks) 6 weeks  -MD     PT Plan Comments Continue Airex balance, LE strength, walking activities.   -MD       User Key  (r) = Recorded By, (t) = Taken By, (c) = Cosigned By    Initials Name Provider Type    MD Sulaiman Burns, PT Physical Therapist                    Exercises       04/13/17 0800          Subjective Comments    Subjective Comments No change in dizziness at this time, patient reports that day after her therapy she had an episode where she leaned forward and got very dizzy. First time that has happened.   -MD      Subjective Pain    Able to rate subjective pain? yes  -MD      Pre-Treatment Pain Level 4  -MD      Post-Treatment Pain Level 4  -MD      Subjective Pain Comment back pain  -MD      Aquatics    Aquatics performed? No  -MD      Exercise 1    Exercise Name 1 Pro II for ROM/Endurance  -MD      Resistance 1 --   L3.5  -MD      Time (Minutes) 1 8  -MD      Exercise 2    Exercise Name 2 Airex EO/ft tog  -MD      Reps 2 3  -MD      Time (Seconds) 2 30  -MD      Exercise 3    Exercise Name 3 Airex EC/ft apart  -MD      Reps 3 3  -MD      Exercise 4    Exercise Name 4 EC/ft together  -MD      Reps 4 3  -MD      Time (Seconds) 4 30  -MD      Exercise 5    Exercise Name 5 Airex CR/TR  -MD      Sets 5 2  -MD      Reps 5 15  -MD      Exercise 6    Exercise Name 6 Airex MS  -MD      Sets 6 2  -MD      Reps 6 10  -MD      Exercise 7    Exercise Name 7 Gt/with head turns  -MD      Reps 7 --   3 laps 30 ft  -MD      Exercise 8    Exercise Name 8 Sit to Stand/no UE assist  -MD      Sets 8 2  -MD      Reps 8 10  -MD      Exercise 9    Exercise Name 9 Epley Maneuver  -MD      Time (Minutes) 9 2  -MD        User Key  (r) = Recorded By, (t) = Taken By, (c) = Cosigned By    Initials Name Provider Type    MD Sulaiman Burns, PT Physical Therapist                               PT OP Goals       04/13/17 0800       PT Short Term Goals    STG Date to Achieve 04/27/17  -MD     STG 1 EC/ft tog to 30 secs or  greater.   -MD     STG 1 Progress Met  -MD     STG 2 Full treatment session, no dizziness noted.   -MD     STG 2 Progress Not Met  -MD     STG 3 Able to wake up in the am and not have to grab onto something immediately after standing up.   -MD     STG 3 Progress Not Met  -MD     Long Term Goals    LTG Date to Achieve 04/27/17  -MD     LTG 1 EC/ft tog 1 min or greater.   -MD     LTG 1 Progress Not Met  -MD     LTG 2 Able to go a full day without dizziness.   -MD     LTG 2 Progress Not Met  -MD     LTG 3 I with HEP.   -MD     LTG 3 Progress Progressing  -MD     LTG 4 Able to go a full week without dizziness.   -MD     LTG 4 Progress Not Met  -MD     Time Calculation    PT Goal Re-Cert Due Date 04/27/17   Visits 3/3, MD PRN  -MD       User Key  (r) = Recorded By, (t) = Taken By, (c) = Cosigned By    Initials Name Provider Type    MD Sulaiman Burns, PT Physical Therapist                Therapy Education       04/13/17 0800          Therapy Education    Given HEP;Symptoms/condition management;Fall prevention and home safety;Posture/body mechanics;Pain management  -MD      Program Reinforced  -MD      How Provided Verbal;Demonstration  -MD      Provided to Patient  -MD      Level of Understanding Teach back education performed;Verbalized  -MD        User Key  (r) = Recorded By, (t) = Taken By, (c) = Cosigned By    Initials Name Provider Type    MD Sulaiman Burns, PT Physical Therapist                Time Calculation:   Start Time: 0800  Stop Time: 0839  Time Calculation (min): 39 min  Total Timed Code Minutes- PT: 39 minute(s)    Therapy Charges for Today     Code Description Service Date Service Provider Modifiers Qty    [id number]  PT THER PROC EA 15 MIN 4/13/2017 Sulaiman Burns, PT GP 3                    Sulaiman Burns, PT  4/13/2017

## 2017-04-17 ENCOUNTER — APPOINTMENT (OUTPATIENT)
Dept: PHYSICAL THERAPY | Facility: HOSPITAL | Age: 77
End: 2017-04-17

## 2017-04-20 ENCOUNTER — HOSPITAL ENCOUNTER (OUTPATIENT)
Dept: PHYSICAL THERAPY | Facility: HOSPITAL | Age: 77
Setting detail: THERAPIES SERIES
Discharge: HOME | End: 2017-04-20

## 2017-04-20 DIAGNOSIS — R42 VERTIGO: Primary | ICD-10-CM

## 2017-04-20 PROCEDURE — 97110 THERAPEUTIC EXERCISES: CPT

## 2017-04-20 NOTE — PROGRESS NOTES
Outpatient Physical Therapy Ortho Treatment Note   Brayan Logan     Patient Name: Sara Solis  : 1940  MRN: [id number]  Today's Date: 2017      Visit Date: 2017     Subjective Improvement:  0%     Attendance:   Approved:    Medicare visits  thru        MD follow up: prn          RC date:     17    Visit Dx:    ICD-10-CM ICD-9-CM   1. Vertigo R42 780.4       Patient Active Problem List   Diagnosis   • Low back pain   • Actinic keratosis   • Shoulder pain, left   • Pure hypercholesterolemia   • Nausea   • History of fatigue   • Insomnia        Past Medical History:   Diagnosis Date   • Actinic keratosis    • Allergic rhinitis    • Altered bowel function    • Anxiety state    • Aptyalism    • Attention deficit hyperactivity disorder    • Cough    • Degenerative joint disease involving multiple joints    • Depressive disorder    • Diarrhea    • Disorder of skin    • Fissure in skin    • Foot pain    • Generalized anxiety disorder    • Herpes labialis    • History of bone scan 10/01/2008    DXA BONE DENSITY AXIAL 57718 (2) - Osteopenia of the lumbar spine, which has improved compared to the prior study. Normal bone mineral density of the hips, which has improved compared to the prior study   • History of colonoscopy 2011    Colon endoscopy 46560 (2) - Diverticulosis found in sigmoid colon. Moderate fixation of sigmoid colon. Internal hemorrhoids found in anus.    • History of esophagogastroduodenoscopy 2011    EGD w/ tube 19074 (1) - Normal hypopharynx, GE junction, duodenum, symmetrical & patent pylorus. Normal esophagus. Dilatation performed. Chronic gastritis found in antrum. Biopsy taken.   • History of mammogram 10/01/2008    Mammogram, both breasts (1) - Negative mammogram. Recommend follow-up in 12 months;     • Incontinence of feces    • Increased frequency of urination    • Insomnia    • Irritable bowel syndrome    • Knee pain    • Lipoma of  shoulder    • Low back pain    • Lumbosacral radiculopathy    • Nausea    • Nervous system abnormality     Disorder of the peripheral nervous system     • Osteopenia    • Pain in limb    • Pain in lower limb    • Plantar fasciitis    • Pure hypercholesterolemia    • Restless legs    • Skin lesion     nose   • Spasm    • Spasm of back muscles    • Squamous cell carcinoma of skin    • Stress fracture    • Tear film insufficiency    • Trochanteric bursitis of right hip    • Upper respiratory infection    • Urinary tract infectious disease    • Vertigo         Past Surgical History:   Procedure Laterality Date   • BREAST BIOPSY  12/04/1995    BIOPSY OF BREAST OPEN 72980 (2) - Right,Exscision of mass.Fibroadenoma. Fibrocystic disease, proliferative   • CATARACT EXTRACTION WITH INTRAOCULAR LENS IMPLANT Right 03/07/2006    Remove cataract, insert lens (1) - right   • DILATATION AND CURETTAGE  10/02/1986    D&C (1) - Fractional D&C laparoscopic tubal sterilization. Plus uterine fibroids, approximately 10-12 weeks in size   • FOOT SURGERY  03/20/1990    Foot/toes surgery procedure (1) - Left,Excisional biopsy. Tumor, supposed fibroma, 5th toe   • INJECTION OF MEDICATION  06/08/2015    Kenalog (2) - SERENA Robert   • LIVER BIOPSY  04/26/2000    Needle biopsy of liver (1) - percutaneous liver biopsy;     • OTHER SURGICAL HISTORY  10/17/2002    Chest procedure (2) - Intralesional injection of keloid of chest    • OTHER SURGICAL HISTORY  10/25/2001    Remove axillary lymph nodes (1) - Biopsy, Left axillary adenopathy. 4 lymph nodes with reactive hyperplasia No tumor seen   • OTHER SURGICAL HISTORY  06/17/2013    Biopsy, Each Additional Lesion 77813 (1) - SERENA Robert   • OTHER SURGICAL HISTORY  07/14/2014    Destruction of Premalignant Lesion (1st) 43888 (2)  - SERENA Robert   • OTHER SURGICAL HISTORY  05/10/2016    Drain/Inject Major Joint 71744 (4) - SERENA Robert   • SKIN BIOPSY  07/28/2014    Biopsy of Skin 15506 (2)  - SERENA Robert   • TOTAL ABDOMINAL  HYSTERECTOMY WITH SALPINGO OOPHORECTOMY  09/25/1990    incidental appendectomy.Uterine fibroids             PT Ortho       04/20/17 1000    Subjective Comments    Subjective Comments Went to MD yesterday for back.  -TM    Subjective Pain    Able to rate subjective pain? yes  -TM    Pre-Treatment Pain Level 2  -TM    Post-Treatment Pain Level 2  -TM    Subjective Pain Comment back pain  -TM      User Key  (r) = Recorded By, (t) = Taken By, (c) = Cosigned By    Initials Name Provider Type     Debra Luque PTA Physical Therapy Assistant                            PT Assessment/Plan       04/20/17 1100       PT Assessment    Functional Limitations Decreased safety during functional activities;Impaired gait;Limitation in home management;Limitations in community activities;Limitations in functional capacity and performance;Performance in leisure activities;Performance in self-care ADL  -TM     Impairments Balance;Gait  -TM     Assessment Comments No nystagmus noted with Epley maneuver.  Pt tolerated balance activities well.    -TM     Rehab Potential Good  -TM     Patient would benefit from skilled therapy intervention Yes  -TM     PT Plan    PT Frequency 2x/week  -TM     Predicted Duration of Therapy Intervention (days/wks) 6 weeks  -TM     PT Plan Comments EC/ft tog x 1 min  -TM       User Key  (r) = Recorded By, (t) = Taken By, (c) = Cosigned By    Initials Name Provider Type     Debra Luque PTA Physical Therapy Assistant                    Exercises       04/20/17 1000          Subjective Comments    Subjective Comments Went to MD yesterday for back.  -TM      Subjective Pain    Able to rate subjective pain? yes  -TM      Pre-Treatment Pain Level 2  -TM      Post-Treatment Pain Level 2  -TM      Subjective Pain Comment back pain  -TM      Exercise 1    Exercise Name 1 PRO II for ROM/endurance  -TM      Resistance 1 --   4.0  -TM      Time (Minutes) 1 8  -TM      Exercise 2    Exercise Name 2 Airex  EO/ft tog  -TM      Reps 2 2  -TM      Time (Seconds) 2 30  -TM      Exercise 3    Exercise Name 3 EC/ft tog  -TM      Reps 3 3  -TM      Time (Seconds) 3 30  -TM      Exercise 4    Exercise Name 4 tandem stance  -TM      Reps 4 4  -TM      Time (Seconds) 4 30  -TM      Exercise 5    Exercise Name 5 SLS L/R   -TM      Reps 5 4  -TM      Time (Seconds) 5 4-6 sec  -TM      Exercise 6    Exercise Name 6 gait with head turn on command  -TM      Reps 6 --   3 laps of 30 ft  -TM      Exercise 7    Exercise Name 7 sit to stands no UE  -TM      Sets 7 2  -TM      Reps 7 10  -TM      Intensity 7 --   chair  -TM      Exercise 8    Exercise Name 8 Airex CR/TR  -TM      Sets 8 2  -TM      Reps 8 10  -TM      Exercise 9    Exercise Name 9 Airex mini squats  -TM      Sets 9 2  -TM      Reps 9 10  -TM      Exercise 10    Exercise Name 10 Epley maneuver ( right)  -TM      Sets 10 2  -TM        User Key  (r) = Recorded By, (t) = Taken By, (c) = Cosigned By    Initials Name Provider Type    ALE Luque, PTA Physical Therapy Assistant                               PT OP Goals       04/20/17 1100       PT Short Term Goals    STG Date to Achieve 04/27/17  -TM     STG 1 EC/ft tog to 30 secs or greater.   -TM     STG 1 Progress Met  -TM     STG 2 Full treatment session, no dizziness noted.   -TM     STG 2 Progress Not Met  -TM     STG 3 Able to wake up in the am and not have to grab onto something immediately after standing up.   -TM     STG 3 Progress Not Met  -TM     Long Term Goals    LTG Date to Achieve 04/27/17  -TM     LTG 1 EC/ft tog 1 min or greater.   -TM     LTG 1 Progress Not Met  -TM     LTG 2 Able to go a full day without dizziness.   -TM     LTG 2 Progress Not Met  -TM     LTG 3 I with HEP.   -TM     LTG 3 Progress Progressing  -TM     LTG 4 Able to go a full week without dizziness.   -TM     LTG 4 Progress Not Met  -TM       User Key  (r) = Recorded By, (t) = Taken By, (c) = Cosigned By    Initials Name Provider  Type    TM Debra Luque PTA Physical Therapy Assistant                    Time Calculation:   Start Time: 1020  Stop Time: 1105  Time Calculation (min): 45 min  Total Timed Code Minutes- PT: 45 minute(s)    Therapy Charges for Today     Code Description Service Date Service Provider Modifiers Qty    73659098787 HC PT THER PROC EA 15 MIN 4/20/2017 Debra Luque PTA GP 3                    Debra Luque PTA  4/20/2017

## 2017-04-24 ENCOUNTER — HOSPITAL ENCOUNTER (OUTPATIENT)
Dept: PHYSICAL THERAPY | Facility: HOSPITAL | Age: 77
Setting detail: THERAPIES SERIES
Discharge: HOME | End: 2017-04-24

## 2017-04-24 DIAGNOSIS — R42 VERTIGO: Primary | ICD-10-CM

## 2017-04-24 PROCEDURE — 97110 THERAPEUTIC EXERCISES: CPT

## 2017-04-24 NOTE — PROGRESS NOTES
Outpatient Physical Therapy Ortho Treatment Note   Brayan Logan     Patient Name: Sara Solis  : 1940  MRN: [id number]  Today's Date: 2017      Visit Date: 2017     Subjective Improvement:   0    Attendance:   Approved:  Medicare visits from 17 thru 17         MD follow up:      prn      date:  17       Visit Dx:    ICD-10-CM ICD-9-CM   1. Vertigo R42 780.4       Patient Active Problem List   Diagnosis   • Low back pain   • Actinic keratosis   • Shoulder pain, left   • Pure hypercholesterolemia   • Nausea   • History of fatigue   • Insomnia        Past Medical History:   Diagnosis Date   • Actinic keratosis    • Allergic rhinitis    • Altered bowel function    • Anxiety state    • Aptyalism    • Attention deficit hyperactivity disorder    • Cough    • Degenerative joint disease involving multiple joints    • Depressive disorder    • Diarrhea    • Disorder of skin    • Fissure in skin    • Foot pain    • Generalized anxiety disorder    • Herpes labialis    • History of bone scan 10/01/2008    DXA BONE DENSITY AXIAL 36620 (2) - Osteopenia of the lumbar spine, which has improved compared to the prior study. Normal bone mineral density of the hips, which has improved compared to the prior study   • History of colonoscopy 2011    Colon endoscopy 29229 (2) - Diverticulosis found in sigmoid colon. Moderate fixation of sigmoid colon. Internal hemorrhoids found in anus.    • History of esophagogastroduodenoscopy 2011    EGD w/ tube 35494 (1) - Normal hypopharynx, GE junction, duodenum, symmetrical & patent pylorus. Normal esophagus. Dilatation performed. Chronic gastritis found in antrum. Biopsy taken.   • History of mammogram 10/01/2008    Mammogram, both breasts (1) - Negative mammogram. Recommend follow-up in 12 months;     • Incontinence of feces    • Increased frequency of urination    • Insomnia    • Irritable bowel syndrome    • Knee pain    •  Lipoma of shoulder    • Low back pain    • Lumbosacral radiculopathy    • Nausea    • Nervous system abnormality     Disorder of the peripheral nervous system     • Osteopenia    • Pain in limb    • Pain in lower limb    • Plantar fasciitis    • Pure hypercholesterolemia    • Restless legs    • Skin lesion     nose   • Spasm    • Spasm of back muscles    • Squamous cell carcinoma of skin    • Stress fracture    • Tear film insufficiency    • Trochanteric bursitis of right hip    • Upper respiratory infection    • Urinary tract infectious disease    • Vertigo         Past Surgical History:   Procedure Laterality Date   • BREAST BIOPSY  12/04/1995    BIOPSY OF BREAST OPEN 26608 (2) - Right,Exscision of mass.Fibroadenoma. Fibrocystic disease, proliferative   • CATARACT EXTRACTION WITH INTRAOCULAR LENS IMPLANT Right 03/07/2006    Remove cataract, insert lens (1) - right   • DILATATION AND CURETTAGE  10/02/1986    D&C (1) - Fractional D&C laparoscopic tubal sterilization. Plus uterine fibroids, approximately 10-12 weeks in size   • FOOT SURGERY  03/20/1990    Foot/toes surgery procedure (1) - Left,Excisional biopsy. Tumor, supposed fibroma, 5th toe   • INJECTION OF MEDICATION  06/08/2015    Kenalog (2) - SERENA Robert   • LIVER BIOPSY  04/26/2000    Needle biopsy of liver (1) - percutaneous liver biopsy;     • OTHER SURGICAL HISTORY  10/17/2002    Chest procedure (2) - Intralesional injection of keloid of chest    • OTHER SURGICAL HISTORY  10/25/2001    Remove axillary lymph nodes (1) - Biopsy, Left axillary adenopathy. 4 lymph nodes with reactive hyperplasia No tumor seen   • OTHER SURGICAL HISTORY  06/17/2013    Biopsy, Each Additional Lesion 98871 (1) - SERENA Robert   • OTHER SURGICAL HISTORY  07/14/2014    Destruction of Premalignant Lesion (1st) 88346 (2)  - SERENA Robert   • OTHER SURGICAL HISTORY  05/10/2016    Drain/Inject Major Joint 19119 (4) - SERENA Robert   • SKIN BIOPSY  07/28/2014    Biopsy of Skin 67327 (2)  - SERENA Robert   • TOTAL  ABDOMINAL HYSTERECTOMY WITH SALPINGO OOPHORECTOMY  09/25/1990    incidental appendectomy.Uterine fibroids             PT Ortho       04/24/17 1300    Subjective Comments    Subjective Comments Saw MD last week & increased Neurotin dosage.  -TM    Subjective Pain    Able to rate subjective pain? yes  -TM    Pre-Treatment Pain Level 4  -TM    Post-Treatment Pain Level 4  -TM    Subjective Pain Comment back pain  -TM      User Key  (r) = Recorded By, (t) = Taken By, (c) = Cosigned By    Initials Name Provider Type     Debra Luque PTA Physical Therapy Assistant                            PT Assessment/Plan       04/24/17 1300       PT Assessment    Functional Limitations Decreased safety during functional activities;Impaired gait;Limitation in home management;Limitations in community activities;Limitations in functional capacity and performance;Performance in leisure activities;Performance in self-care ADL  -TM     Impairments Balance;Gait  -TM     Assessment Comments Min nystagmus during Epley maneuver with R head rotation.  Tolerated other activities well. Balance improvement noted with SLS.  -TM     Rehab Potential Good  -TM     Patient would benefit from skilled therapy intervention Yes  -TM     PT Plan    PT Frequency 2x/week  -TM     Predicted Duration of Therapy Intervention (days/wks) 6 weeks  -TM     PT Plan Comments [place]es floor to high shelf with rotation.  -TM       User Key  (r) = Recorded By, (t) = Taken By, (c) = Cosigned By    Initials Name Provider Type     Debra Luque PTA Physical Therapy Assistant                    Exercises       04/24/17 1300          Subjective Comments    Subjective Comments Saw MD last week & increased Neurotin dosage.  -TM      Subjective Pain    Able to rate subjective pain? yes  -TM      Pre-Treatment Pain Level 4  -TM      Post-Treatment Pain Level 4  -TM      Subjective Pain Comment back pain  -TM      Exercise 1    Exercise Name 1 PRO II for  ROM/endurance  -TM      Resistance 1 --   4.0  -TM      Time (Minutes) 1 8  -TM      Exercise 2    Exercise Name 2 EC/ft tog   -TM      Reps 2 2  -TM      Time (Minutes) 2 1  -TM      Exercise 3    Exercise Name 3 tandem stance EC  -TM      Reps 3 --   4 trials  -TM      Time (Seconds) 3 5-7  -TM      Exercise 4    Exercise Name 4 SLS L/R  -TM      Reps 4 4  -TM      Time (Seconds) 4 L 20 sec/ R 6-8 sec  -TM      Exercise 5    Exercise Name 5 sit to stands with UE/hip ext  -TM      Sets 5 2  -TM      Reps 5 10  -TM      Exercise 6    Exercise Name 6 Airex CR/TR  -TM      Sets 6 2  -TM      Reps 6 10  -TM      Exercise 7    Exercise Name 7 Airex mini squats  -TM      Sets 7 2  -TM      Reps 7 10  -TM      Exercise 8    Exercise Name 8 Airex beam tandem stance  -TM      Reps 8 4  -TM      Time (Seconds) 8 30  -TM      Exercise 9    Exercise Name 9 obstacle course  -TM      Time (Minutes) 9 5  -TM      Exercise 10    Exercise Name 10 Epley maneuver  -TM      Sets 10 2  -TM        User Key  (r) = Recorded By, (t) = Taken By, (c) = Cosigned By    Initials Name Provider Type    TM Debra Luque, PTA Physical Therapy Assistant                               PT OP Goals       04/24/17 1300       PT Short Term Goals    STG Date to Achieve 04/27/17  -TM     STG 1 EC/ft tog to 30 secs or greater.   -TM     STG 1 Progress Met  -TM     STG 2 Full treatment session, no dizziness noted.   -TM     STG 2 Progress Not Met  -TM     STG 3 Able to wake up in the am and not have to grab onto something immediately after standing up.   -TM     STG 3 Progress Not Met  -TM     Long Term Goals    LTG Date to Achieve 04/27/17  -TM     LTG 1 EC/ft tog 1 min or greater.   -TM     LTG 1 Progress Met  -TM     LTG 2 Able to go a full day without dizziness.   -TM     LTG 2 Progress Not Met  -TM     LTG 3 I with HEP.   -TM     LTG 3 Progress Progressing  -TM     LTG 4 Able to go a full week without dizziness.   -TM     LTG 4 Progress Not Met  -TM        User Key  (r) = Recorded By, (t) = Taken By, (c) = Cosigned By    Initials Name Provider Type    TM Debra Luque PTA Physical Therapy Assistant                    Time Calculation:   Start Time: 1350  Stop Time: 1448  Time Calculation (min): 58 min  Total Timed Code Minutes- PT: 58 minute(s)    Therapy Charges for Today     Code Description Service Date Service Provider Modifiers Qty    75102783335 HC PT THER PROC EA 15 MIN 4/24/2017 Debra Luque PTA GP 4                    Debra Luque PTA  4/24/2017

## 2017-04-26 DIAGNOSIS — Z13.820 SCREENING FOR OSTEOPOROSIS: ICD-10-CM

## 2017-04-26 DIAGNOSIS — Z12.31 VISIT FOR SCREENING MAMMOGRAM: Primary | ICD-10-CM

## 2017-05-01 ENCOUNTER — HOSPITAL ENCOUNTER (OUTPATIENT)
Dept: PHYSICAL THERAPY | Facility: HOSPITAL | Age: 77
Setting detail: THERAPIES SERIES
End: 2017-05-01

## 2017-05-11 ENCOUNTER — HOSPITAL ENCOUNTER (OUTPATIENT)
Dept: PHYSICAL THERAPY | Facility: HOSPITAL | Age: 77
Setting detail: THERAPIES SERIES
Discharge: HOME | End: 2017-05-11

## 2017-05-11 DIAGNOSIS — R42 VERTIGO: Primary | ICD-10-CM

## 2017-05-11 PROCEDURE — 97110 THERAPEUTIC EXERCISES: CPT

## 2017-05-16 PROCEDURE — G8980 MOBILITY D/C STATUS: HCPCS

## 2017-05-16 PROCEDURE — G8979 MOBILITY GOAL STATUS: HCPCS

## 2017-06-08 RX ORDER — PRAMIPEXOLE DIHYDROCHLORIDE 0.5 MG/1
TABLET ORAL
Qty: 90 TABLET | Refills: 3 | Status: SHIPPED | OUTPATIENT
Start: 2017-06-08 | End: 2017-08-24 | Stop reason: SDUPTHER

## 2017-06-14 RX ORDER — PROMETHAZINE HYDROCHLORIDE 25 MG/1
TABLET ORAL
Qty: 30 TABLET | Refills: 0 | Status: SHIPPED | OUTPATIENT
Start: 2017-06-14 | End: 2017-12-23 | Stop reason: HOSPADM

## 2017-07-24 ENCOUNTER — TELEPHONE (OUTPATIENT)
Dept: FAMILY MEDICINE CLINIC | Facility: CLINIC | Age: 77
End: 2017-07-24

## 2017-07-24 RX ORDER — GABAPENTIN 600 MG/1
TABLET ORAL
Qty: 270 TABLET | Refills: 1 | Status: SHIPPED | OUTPATIENT
Start: 2017-07-24 | End: 2018-01-24 | Stop reason: SDUPTHER

## 2017-08-03 ENCOUNTER — APPOINTMENT (OUTPATIENT)
Dept: PHYSICAL THERAPY | Facility: HOSPITAL | Age: 77
End: 2017-08-03

## 2017-08-14 ENCOUNTER — TRANSCRIBE ORDERS (OUTPATIENT)
Dept: PHYSICAL THERAPY | Facility: HOSPITAL | Age: 77
End: 2017-08-14

## 2017-08-14 ENCOUNTER — HOSPITAL ENCOUNTER (OUTPATIENT)
Dept: PHYSICAL THERAPY | Facility: HOSPITAL | Age: 77
Setting detail: THERAPIES SERIES
Discharge: HOME | End: 2017-08-14

## 2017-08-14 DIAGNOSIS — M51.16 LUMBAR DISC PROLAPSE WITH COMPRESSION RADICULOPATHY: Primary | ICD-10-CM

## 2017-08-14 DIAGNOSIS — M51.16 INTERVERTEBRAL DISC DISORDERS WITH RADICULOPATHY, LUMBAR REGION: Primary | ICD-10-CM

## 2017-08-14 PROCEDURE — G8979 MOBILITY GOAL STATUS: HCPCS | Performed by: PHYSICAL THERAPIST

## 2017-08-14 PROCEDURE — 97161 PT EVAL LOW COMPLEX 20 MIN: CPT | Performed by: PHYSICAL THERAPIST

## 2017-08-14 PROCEDURE — G8980 MOBILITY D/C STATUS: HCPCS | Performed by: PHYSICAL THERAPIST

## 2017-08-14 PROCEDURE — G8978 MOBILITY CURRENT STATUS: HCPCS | Performed by: PHYSICAL THERAPIST

## 2017-08-14 NOTE — THERAPY DISCHARGE NOTE
Outpatient Physical Therapy Ortho Initial Evaluation/Discharge  [facility]     Patient Name: Sara Solis  : 1940  MRN: [id number]  Today's Date: 2017      Visit Date: 2017     Subjective Improvement:   N/A    Attendance:   Approved:    Requires authorization       MD follow up:   17         date:     N/A per discharge    Patient Active Problem List   Diagnosis   • Low back pain   • Actinic keratosis   • Shoulder pain, left   • Pure hypercholesterolemia   • Nausea   • History of fatigue   • Insomnia        Past Medical History:   Diagnosis Date   • Actinic keratosis    • Allergic rhinitis    • Altered bowel function    • Anxiety state    • Aptyalism    • Attention deficit hyperactivity disorder    • Cough    • Degenerative joint disease involving multiple joints    • Depressive disorder    • Diarrhea    • Disorder of skin    • Fissure in skin    • Foot pain    • Generalized anxiety disorder    • Herpes labialis    • History of bone scan 10/01/2008    DXA BONE DENSITY AXIAL 23627 (2) - Osteopenia of the lumbar spine, which has improved compared to the prior study. Normal bone mineral density of the hips, which has improved compared to the prior study   • History of colonoscopy 2011    Colon endoscopy 29517 (2) - Diverticulosis found in sigmoid colon. Moderate fixation of sigmoid colon. Internal hemorrhoids found in anus.    • History of esophagogastroduodenoscopy 2011    EGD w/ tube 31541 (1) - Normal hypopharynx, GE junction, duodenum, symmetrical & patent pylorus. Normal esophagus. Dilatation performed. Chronic gastritis found in antrum. Biopsy taken.   • History of mammogram 10/01/2008    Mammogram, both breasts (1) - Negative mammogram. Recommend follow-up in 12 months;     • Incontinence of feces    • Increased frequency of urination    • Insomnia    • Irritable bowel syndrome    • Knee pain    • Lipoma of shoulder    • Low back pain    •  Lumbosacral radiculopathy    • Nausea    • Nervous system abnormality     Disorder of the peripheral nervous system     • Osteopenia    • Pain in limb    • Pain in lower limb    • Plantar fasciitis    • Pure hypercholesterolemia    • Restless legs    • Skin lesion     nose   • Spasm    • Spasm of back muscles    • Squamous cell carcinoma of skin    • Stress fracture    • Tear film insufficiency    • Trochanteric bursitis of right hip    • Upper respiratory infection    • Urinary tract infectious disease    • Vertigo         Past Surgical History:   Procedure Laterality Date   • BREAST BIOPSY  12/04/1995    BIOPSY OF BREAST OPEN 87034 (2) - Right,Exscision of mass.Fibroadenoma. Fibrocystic disease, proliferative   • CATARACT EXTRACTION WITH INTRAOCULAR LENS IMPLANT Right 03/07/2006    Remove cataract, insert lens (1) - right   • DILATATION AND CURETTAGE  10/02/1986    D&C (1) - Fractional D&C laparoscopic tubal sterilization. Plus uterine fibroids, approximately 10-12 weeks in size   • FOOT SURGERY  03/20/1990    Foot/toes surgery procedure (1) - Left,Excisional biopsy. Tumor, supposed fibroma, 5th toe   • INJECTION OF MEDICATION  06/08/2015    Kenalog (2) - SERENA Robert   • LIVER BIOPSY  04/26/2000    Needle biopsy of liver (1) - percutaneous liver biopsy;     • OTHER SURGICAL HISTORY  10/17/2002    Chest procedure (2) - Intralesional injection of keloid of chest    • OTHER SURGICAL HISTORY  10/25/2001    Remove axillary lymph nodes (1) - Biopsy, Left axillary adenopathy. 4 lymph nodes with reactive hyperplasia No tumor seen   • OTHER SURGICAL HISTORY  06/17/2013    Biopsy, Each Additional Lesion 11710 (1) - SERENA Robert   • OTHER SURGICAL HISTORY  07/14/2014    Destruction of Premalignant Lesion (1st) 73380 (2)  - SERENA Robert   • OTHER SURGICAL HISTORY  05/10/2016    Drain/Inject Major Joint 54316 (4) - SERENA Robert   • SKIN BIOPSY  07/28/2014    Biopsy of Skin 31387 (2)  - SERENA Robert   • TOTAL ABDOMINAL HYSTERECTOMY WITH SALPINGO  OOPHORECTOMY  09/25/1990    incidental appendectomy.Uterine fibroids     No Known Allergies      Current Outpatient Prescriptions:   •  aspirin 81 MG chewable tablet, Chew 81 mg Daily., Disp: , Rfl:   •  Calcium Carb-Cholecalciferol (CALCIUM + D3 PO), Take  by mouth Daily., Disp: , Rfl:   •  cholecalciferol (VITAMIN D3) 1000 UNITS tablet, Take 1,000 Units by mouth Daily., Disp: , Rfl:   •  FERROUS SULFATE PO, Take  by mouth Daily., Disp: , Rfl:   •  fexofenadine (ALLEGRA) 180 MG tablet, Take 180 mg by mouth Every Night., Disp: , Rfl:   •  gabapentin (NEURONTIN) 600 MG tablet, 1/2 to one tablet tid prn., Disp: 270 tablet, Rfl: 1  •  ibuprofen (ADVIL,MOTRIN) 600 MG tablet, Take 600 mg by mouth Daily., Disp: , Rfl:   •  Magnesium 100 MG capsule, Take 400 mg by mouth Daily., Disp: , Rfl:   •  meclizine (ANTIVERT) 25 MG tablet, Take 1 tablet by mouth 3 (Three) Times a Day As Needed for dizziness., Disp: 90 tablet, Rfl: 11  •  Multiple Vitamins-Minerals (CENTRUM SILVER PO), Take  by mouth Daily., Disp: , Rfl:   •  omeprazole (PRILOSEC) 20 MG capsule, Take 1 capsule by mouth 2 (Two) Times a Day., Disp: 180 capsule, Rfl: 3  •  pramipexole (MIRAPEX) 0.5 MG tablet, TAKE ONE TABLET BY MOUTH 1 HOUR BEFORE BEDTIME., Disp: 90 tablet, Rfl: 3  •  promethazine (PHENERGAN) 25 MG tablet, TAKE 1 TABLET BY MOUTH EVERY 6 (SIX) HOURS AS NEEDED FOR NAUSEA OR VOMITING * MAY CAUSE DROWSINESS., Disp: 30 tablet, Rfl: 0  •  traMADol (ULTRAM) 50 MG tablet, Take 1 tablet by mouth Every 8 (Eight) Hours As Needed for moderate pain (4-6)., Disp: 90 tablet, Rfl: 2  •  valACYclovir (VALTREX) 1000 MG tablet, Take 2 tablets by mouth 2 (Two) Times a Day., Disp: 4 tablet, Rfl: 11        Visit Dx:     ICD-10-CM ICD-9-CM   1. Intervertebral disc disorders with radiculopathy, lumbar region M51.16 724.4             Patient History       08/14/17 1500          History    Chief Complaint Pain  -KG      Type of Pain Back pain  -KG      Date Current Problem(s)  "Began 06/21/17  -KG      Brief Description of Current Complaint Pt presents s/p low back surgery on 6/21/17 due to a bulging disc. States he had bone spurs removed & discectomy. States the pain in her RLE is much better & almost completely gone. States every now & then she will get a catch in her low back when walking. States her R lateral hip also hurts at times.  -KG      Onset Date- PT 8/14/17  -KG      Patient/Caregiver Goals Relieve pain;Improve mobility  -KG      Occupation/sports/leisure activities Pt is retired. Pt enjoys reading, crossword puzzles  -KG      Pain     Pain Location Back  -KG      Pain at Present 0  -KG      Pain at Best 0  -KG      Pain at Worst 3  -KG      Pain Frequency Intermittent  -KG      Pain Description Aching   \"catching\"  -KG      What Performance Factors Make the Current Problem(s) WORSE? Walking/standing for long periods, bending/leaning over  -KG      What Performance Factors Make the Current Problem(s) BETTER? None  -KG      Tolerance Time- Standing Prolonged standing  -KG      Tolerance Time- Sitting States she has pain sometimes when leaning against a hard surface  -KG      Tolerance Time- Walking 1/4 mile  -KG        User Key  (r) = Recorded By, (t) = Taken By, (c) = Cosigned By    Initials Name Provider Type    KG Nataly Sandhu, PT Physical Therapist                PT Ortho       08/14/17 1500    Subjective Comments    Subjective Comments Pt states her back is doing better since her surgery. States she mostly experiences a \"catch\" in her center low back with walking. States she also has issues with vertigo, which has been exacerbated lately. Refer to therapy pt history for further details.  -KG    Precautions and Contraindications    Precautions/Limitations no known precautions/limitations  -KG    Subjective Pain    Able to rate subjective pain? yes  -KG    Pre-Treatment Pain Level 0  -KG    Post-Treatment Pain Level 0  -KG    Posture/Observations    Posture/Observations " Comments Pt shows no signs of acute distress. Non-antalgic gait.  -KG    Quarter Clearing    Quarter Clearing Lower Quarter Clearing  -KG    Lumbar ROM Screen- Lower Quarter Clearing    Lumbar Flexion Normal   Fingertips to ankles; min low back pain  -KG    Lumbar Extension Impaired   30%  -KG    Lumbar Lateral Flexion Normal   Fingertips to joint line bilaterally  -KG    Special Tests/Palpation    Special Tests/Palpation Lumbar/SI  -KG    Lumbosacral Palpation    Lumbosacral Segment Tender   L4-5  -KG    Piriformis Bilateral:;Tender  -KG    Greater Tuberosity Right:;Tender   IT Band  -KG    Lumbosacral Palpation? Yes  -KG    Left Hip    Hip Flexion Gross Movement (4+/5) good plus  -KG    Hip ABduction Gross Movement (4+/5) good plus  -KG    Hip ADduction Gross Movement (4+/5) good plus  -KG    Right Hip    Hip Flexion Gross Movement (4/5) good  -KG    Hip ABduction Gross Movement (4+/5) good plus  -KG    Hip ADduction Gross Movement (4+/5) good plus  -KG    Left Knee    Knee Extension Gross Movement (4+/5) good plus  -KG    Knee Flexion Hamstrings (4+/5) good plus  -KG    Right Knee    Knee Extension Gross Movement (4+/5) good plus  -KG    Knee Flexion Gross Movement (4/5) good  -KG      User Key  (r) = Recorded By, (t) = Taken By, (c) = Cosigned By    Initials Name Provider Type    KG Nataly Sandhu, PT Physical Therapist                             Therapy Education       08/14/17 1500          Therapy Education    Education Details HEP: iso TA & associated progression for supine, seated, & standing positions, HL hip abd tband, SKTC stretch, HS stretch, piriformis stretch, stand hip 3-way; posture/pelvic neutral education  -KG      Given HEP;Symptoms/condition management;Pain management;Posture/body mechanics  -KG      Program New  -KG      How Provided Verbal;Demonstration;Written  -KG      Provided to Patient  -KG      Level of Understanding Teach back education performed;Verbalized;Demonstrated  -KG         User Key  (r) = Recorded By, (t) = Taken By, (c) = Cosigned By    Initials Name Provider Type    KG Nataly Sandhu, PT Physical Therapist                PT OP Goals       08/14/17 1500       PT Short Term Goals    STG Date to Achieve 08/14/17  -KG     STG 1 Verbalize/demonstrate independence in HEP & associated progressions  -KG     STG 1 Progress New;Met  -KG     STG 2 Pt will follow up prn with physical therapist for any questions or concerns regarding HEP  -KG     STG 2 Progress New;Met  -KG     Long Term Goals    LTG Date to Achieve --   deferred  -KG     Time Calculation    PT Goal Re-Cert Due Date 09/04/17   Visit 1/1, MD 9/6/17  -KG       User Key  (r) = Recorded By, (t) = Taken By, (c) = Cosigned By    Initials Name Provider Type    KG Nataly Sandhu, PT Physical Therapist                PT Assessment/Plan       08/14/17 1500       PT Assessment    Functional Limitations Limitations in community activities;Limitations in functional capacity and performance;Performance in leisure activities;Limitation in home management  -KG     Impairments Muscle strength;Pain;Impaired muscle endurance;Posture;Range of motion  -KG     Assessment Comments Pt presents s/p lumbar discectomy on 6/21/17. Pt demonstrates an overall decrease in core stability & BLE strength. TTP note at R lateral hip/IT band. Pt given HEP only at this time focusing on improving overall functional strengthmobility & core stability.  -KG     Rehab Potential Good  -KG     Patient/caregiver participated in establishment of treatment plan and goals Yes  -KG     PT Plan    PT Frequency One time visit  -KG     Predicted Duration of Therapy Intervention (days/wks) One time visit for HEP only per MD order  -KG     PT Plan Comments Pt discharged at this time to independent management/HEP per MD order. Pt will follow-up with therapist prn for any questions or concerns.  -KG       User Key  (r) = Recorded By, (t) = Taken By, (c) = Cosigned By    Initials  "Name Provider Type    MAGNO Sandhu, PT Physical Therapist                Exercises       08/14/17 1500          Subjective Comments    Subjective Comments Pt states her back is doing better since her surgery. States she mostly experiences a \"catch\" in her center low back with walking. States she also has issues with vertigo, which has been exacerbated lately. Refer to therapy pt history for further details.  -KG      Subjective Pain    Able to rate subjective pain? yes  -KG      Pre-Treatment Pain Level 0  -KG      Post-Treatment Pain Level 0  -KG      Aquatics    Aquatics performed? No  -KG      Exercise 1    Exercise Name 1 SKTC Stretch King  -KG      Additional Comments Education for HEP  -KG      Exercise 2    Exercise Name 2 Supine Piriformis Stretch  -KG      Additional Comments Education for HEP  -KG      Exercise 3    Exercise Name 3 HL Hip Abd  -KG      Sets 3 1  -KG      Reps 3 10  -KG      Additional Comments Red Tband  -KG      Exercise 4    Exercise Name 4 Isometric TA  -KG      Sets 4 1  -KG      Reps 4 10  -KG      Time (Seconds) 4 5\" hold  -KG      Exercise 5    Exercise Name 5 Supine/Seated/Standing iso TA progressions  -KG      Additional Comments Education for HEP  -KG      Exercise 6    Exercise Name 6 Standing hip abd/flex/ext for hip strengthening  -KG      Additional Comments Education for HEP  -KG      Exercise 7    Exercise Name 7 Posture education  -KG        User Key  (r) = Recorded By, (t) = Taken By, (c) = Cosigned By    Initials Name Provider Type    MAGNO Sandhu PT Physical Therapist                             Outcome Measures       08/14/17 1500          Modified Oswestry    Modified Oswestry Score/Comments 18%  -KG      Functional Assessment    Outcome Measure Options Modifed Owestry  -KG        User Key  (r) = Recorded By, (t) = Taken By, (c) = Cosigned By    Initials Name Provider Type    MAGNO Sandhu, PT Physical Therapist            Time Calculation:   Start " Time: 1535  Stop Time: 1622  Time Calculation (min): 47 min  Total Timed Code Minutes- PT: 0 minute(s)    Therapy Charges for Today     Code Description Service Date Service Provider Modifiers Qty    35969596594 HC PT MOBILITY CURRENT 8/14/2017 Nataly Sandhu, PT GP, CI 1    83344092698 HC PT MOBILITY PROJECTED 8/14/2017 Nataly Sandhu, PT GP, CI 1    [id number] HC PT MOBILITY DISCHARGE 8/14/2017 Nataly Sandhu, PT GP, CI 1    [id number] HC PT EVAL LOW COMPLEXITY 3 8/14/2017 Nataly Sandhu, PT GP 1          PT G-Codes  PT Professional Judgement Used?: Yes  Outcome Measure Options: Jennifer Escamilla  Score: 18%  Functional Limitation: Mobility: Walking and moving around  Mobility: Walking and Moving Around Current Status (): At least 1 percent but less than 20 percent impaired, limited or restricted  Mobility: Walking and Moving Around Goal Status (): At least 1 percent but less than 20 percent impaired, limited or restricted  Mobility: Walking and Moving Around Discharge Status (): At least 1 percent but less than 20 percent impaired, limited or restricted     OP PT Discharge Summary  Date of Discharge: 08/14/17  Reason for Discharge: Per MD order, other (comment) (HEP only)  Outcomes Achieved: Discharge from facility occurred on same date as evluation  Discharge Destination: Home with home program  Discharge Instructions: Pt discharged at this time to independent management/HEP per MD order. Pt will follow-up with therapist prn for any questions or concerns.        Nataly Sandhu, PT  8/14/2017

## 2017-08-24 RX ORDER — PRAMIPEXOLE DIHYDROCHLORIDE 0.5 MG/1
1 TABLET ORAL NIGHTLY
Qty: 180 TABLET | Refills: 3 | Status: SHIPPED | OUTPATIENT
Start: 2017-08-24 | End: 2018-08-23 | Stop reason: SDUPTHER

## 2017-09-20 ENCOUNTER — TRANSCRIBE ORDERS (OUTPATIENT)
Dept: PHYSICAL THERAPY | Facility: HOSPITAL | Age: 77
End: 2017-09-20

## 2017-09-20 DIAGNOSIS — M51.26 OTHER INTERVERTEBRAL DISC DISPLACEMENT, LUMBAR REGION: Primary | ICD-10-CM

## 2017-09-26 ENCOUNTER — APPOINTMENT (OUTPATIENT)
Dept: PHYSICAL THERAPY | Facility: HOSPITAL | Age: 77
End: 2017-09-26

## 2017-10-03 ENCOUNTER — HOSPITAL ENCOUNTER (OUTPATIENT)
Dept: PHYSICAL THERAPY | Facility: HOSPITAL | Age: 77
Setting detail: THERAPIES SERIES
Discharge: HOME | End: 2017-10-03

## 2017-10-03 ENCOUNTER — TRANSCRIBE ORDERS (OUTPATIENT)
Dept: PHYSICAL THERAPY | Facility: HOSPITAL | Age: 77
End: 2017-10-03

## 2017-10-03 DIAGNOSIS — M51.26 OTHER INTERVERTEBRAL DISC DISPLACEMENT, LUMBAR REGION: Primary | ICD-10-CM

## 2017-10-03 PROCEDURE — G8979 MOBILITY GOAL STATUS: HCPCS

## 2017-10-03 PROCEDURE — G8978 MOBILITY CURRENT STATUS: HCPCS

## 2017-10-03 PROCEDURE — 97161 PT EVAL LOW COMPLEX 20 MIN: CPT

## 2017-10-03 NOTE — THERAPY EVALUATION
Outpatient Physical Therapy Ortho Initial Evaluation  [facility]     Patient Name: Sara Solis  : 1940  MRN: [id number]  Today's Date: 10/3/2017      Subjective Improvement:  Attendance:   Approved: tirso RUBIO Follow up: unknown  RC Date: 10/24/17    Visit Date: 10/03/2017    Patient Active Problem List   Diagnosis   • Low back pain   • Actinic keratosis   • Shoulder pain, left   • Pure hypercholesterolemia   • Nausea   • History of fatigue   • Insomnia        Past Medical History:   Diagnosis Date   • Actinic keratosis    • Allergic rhinitis    • Altered bowel function    • Anxiety state    • Aptyalism    • Attention deficit hyperactivity disorder    • Cough    • Degenerative joint disease involving multiple joints    • Depressive disorder    • Diarrhea    • Disorder of skin    • Fissure in skin    • Foot pain    • Generalized anxiety disorder    • Herpes labialis    • History of bone scan 10/01/2008    DXA BONE DENSITY AXIAL 81488 (2) - Osteopenia of the lumbar spine, which has improved compared to the prior study. Normal bone mineral density of the hips, which has improved compared to the prior study   • History of colonoscopy 2011    Colon endoscopy 87233 (2) - Diverticulosis found in sigmoid colon. Moderate fixation of sigmoid colon. Internal hemorrhoids found in anus.    • History of esophagogastroduodenoscopy 2011    EGD w/ tube 17853 (1) - Normal hypopharynx, GE junction, duodenum, symmetrical & patent pylorus. Normal esophagus. Dilatation performed. Chronic gastritis found in antrum. Biopsy taken.   • History of mammogram 10/01/2008    Mammogram, both breasts (1) - Negative mammogram. Recommend follow-up in 12 months;     • Incontinence of feces    • Increased frequency of urination    • Insomnia    • Irritable bowel syndrome    • Knee pain    • Lipoma of shoulder    • Low back pain    • Lumbosacral radiculopathy    • Nausea    • Nervous system abnormality      Disorder of the peripheral nervous system     • Osteopenia    • Pain in limb    • Pain in lower limb    • Plantar fasciitis    • Pure hypercholesterolemia    • Restless legs    • Skin lesion     nose   • Spasm    • Spasm of back muscles    • Squamous cell carcinoma of skin    • Stress fracture    • Tear film insufficiency    • Trochanteric bursitis of right hip    • Upper respiratory infection    • Urinary tract infectious disease    • Vertigo         Past Surgical History:   Procedure Laterality Date   • BREAST BIOPSY  12/04/1995    BIOPSY OF BREAST OPEN 95274 (2) - Right,Exscision of mass.Fibroadenoma. Fibrocystic disease, proliferative   • CATARACT EXTRACTION WITH INTRAOCULAR LENS IMPLANT Right 03/07/2006    Remove cataract, insert lens (1) - right   • DILATATION AND CURETTAGE  10/02/1986    D&C (1) - Fractional D&C laparoscopic tubal sterilization. Plus uterine fibroids, approximately 10-12 weeks in size   • FOOT SURGERY  03/20/1990    Foot/toes surgery procedure (1) - Left,Excisional biopsy. Tumor, supposed fibroma, 5th toe   • INJECTION OF MEDICATION  06/08/2015    Kenalog (2) - SERENA Robert   • LIVER BIOPSY  04/26/2000    Needle biopsy of liver (1) - percutaneous liver biopsy;     • OTHER SURGICAL HISTORY  10/17/2002    Chest procedure (2) - Intralesional injection of keloid of chest    • OTHER SURGICAL HISTORY  10/25/2001    Remove axillary lymph nodes (1) - Biopsy, Left axillary adenopathy. 4 lymph nodes with reactive hyperplasia No tumor seen   • OTHER SURGICAL HISTORY  06/17/2013    Biopsy, Each Additional Lesion 44431 (1) - SERENA Robert   • OTHER SURGICAL HISTORY  07/14/2014    Destruction of Premalignant Lesion (1st) 56346 (2)  - SERENA Robert   • OTHER SURGICAL HISTORY  05/10/2016    Drain/Inject Major Joint 80010 (4) - SERENA Robert   • SKIN BIOPSY  07/28/2014    Biopsy of Skin 98107 (2)  - SERENA Robert   • TOTAL ABDOMINAL HYSTERECTOMY WITH SALPINGO OOPHORECTOMY  09/25/1990    incidental appendectomy.Uterine fibroids     No  Known Allergies    Current Outpatient Prescriptions:   •  aspirin 81 MG chewable tablet, Chew 81 mg Daily., Disp: , Rfl:   •  Calcium Carb-Cholecalciferol (CALCIUM + D3 PO), Take  by mouth Daily., Disp: , Rfl:   •  cholecalciferol (VITAMIN D3) 1000 UNITS tablet, Take 1,000 Units by mouth Daily., Disp: , Rfl:   •  FERROUS SULFATE PO, Take  by mouth Daily., Disp: , Rfl:   •  fexofenadine (ALLEGRA) 180 MG tablet, Take 180 mg by mouth Every Night., Disp: , Rfl:   •  gabapentin (NEURONTIN) 600 MG tablet, 1/2 to one tablet tid prn., Disp: 270 tablet, Rfl: 1  •  ibuprofen (ADVIL,MOTRIN) 600 MG tablet, Take 600 mg by mouth Daily., Disp: , Rfl:   •  Magnesium 100 MG capsule, Take 400 mg by mouth Daily., Disp: , Rfl:   •  meclizine (ANTIVERT) 25 MG tablet, Take 1 tablet by mouth 3 (Three) Times a Day As Needed for dizziness., Disp: 90 tablet, Rfl: 11  •  Multiple Vitamins-Minerals (CENTRUM SILVER PO), Take  by mouth Daily., Disp: , Rfl:   •  omeprazole (PRILOSEC) 20 MG capsule, Take 1 capsule by mouth 2 (Two) Times a Day., Disp: 180 capsule, Rfl: 3  •  pramipexole (MIRAPEX) 0.5 MG tablet, Take 2 tablets by mouth Every Night., Disp: 180 tablet, Rfl: 3  •  promethazine (PHENERGAN) 25 MG tablet, TAKE 1 TABLET BY MOUTH EVERY 6 (SIX) HOURS AS NEEDED FOR NAUSEA OR VOMITING * MAY CAUSE DROWSINESS., Disp: 30 tablet, Rfl: 0  •  traMADol (ULTRAM) 50 MG tablet, Take 1 tablet by mouth Every 8 (Eight) Hours As Needed for moderate pain (4-6)., Disp: 90 tablet, Rfl: 2  •  valACYclovir (VALTREX) 1000 MG tablet, Take 2 tablets by mouth 2 (Two) Times a Day., Disp: 4 tablet, Rfl: 11      Visit Dx:     ICD-10-CM ICD-9-CM   1. Other intervertebral disc displacement, lumbar region M51.26 722.10             Patient History       10/03/17 1600          History    Chief Complaint Pain  -NH      Type of Pain Back pain  -NH      Date Current Problem(s) Began 06/21/17  -NH      Brief Description of Current Complaint Patient reports having a back surgery  "in June for discs and bone spurs. Patient tried to do the exercises at home without continual therapy but was unsure if she was doing them correctly and continued to have pain. Patient has returned to therapy with reported pain in R hip and \"catch\" in low back at times.   -NH      Onset Date- PT 10/3/17  -NH      Patient/Caregiver Goals Relieve pain;Return to prior level of function;Improve mobility  -NH      Occupation/sports/leisure activities Retired  -NH      Pain     Pain Location Back  -NH      Pain at Present 3  -NH      Pain at Best 3  -NH      Pain at Worst 9  -NH      What Performance Factors Make the Current Problem(s) WORSE? Bending over to wash dishes, Prolonged walking  -NH      Fall Risk Assessment    Any falls in the past year: Yes  -NH      Number of falls reported in the last 12 months 1  -NH        User Key  (r) = Recorded By, (t) = Taken By, (c) = Cosigned By    Initials Name Provider Type    NH America Mejias, PT Physical Therapist                PT Ortho       10/03/17 1500    Subjective Comments    Subjective Comments Patient reports she came to therapy for a visit and thought she could do the exercises at home. She didn't know if she was doing them right and continues to have a \"catch\" in her low back with half bent over tasks like washing the dishes.   -NH    Precautions and Contraindications    Precautions/Limitations no known precautions/limitations  -NH    Subjective Pain    Able to rate subjective pain? yes  -NH    Pre-Treatment Pain Level 3  -NH    Post-Treatment Pain Level 3  -NH    Posture/Observations    Posture/Observations Comments Posture normal for age range  -NH    Lumbar ROM Screen- Lower Quarter Clearing    Lumbar Flexion Normal   Fingertips to ankles; stretching in LB  -NH    Lumbar Extension Normal   Discomfort  -NH    Lumbar Lateral Flexion Normal   Pain end range going to R  -NH    Special Tests/Palpation    Special Tests/Palpation --   Flexibility B LE WFL  -NH    " Lumbosacral Palpation    Piriformis Bilateral:;Tender  -NH    Left Hip    Hip Flexion Gross Movement (3+/5) fair plus  -NH    Hip Extension Gross Movement (4-/5) good minus  -NH    Hip ABduction Gross Movement (3+/5) fair plus  -NH    Hip ADduction Gross Movement (4/5) good  -NH    Right Hip    Hip Flexion Gross Movement (3+/5) fair plus  -NH    Hip Extension Gross Movement (4-/5) good minus  -NH    Hip ABduction Gross Movement (3+/5) fair plus  -NH    Hip ADduction Gross Movement (4/5) good  -NH    Left Knee    Knee Extension Gross Movement (4/5) good  -NH    Knee Flexion Hamstrings (4/5) good  -NH    Right Knee    Knee Extension Gross Movement (4/5) good  -NH    Knee Flexion Gross Movement (4/5) good  -NH    Balance Skills Training    Training Strategies (Balance Skills) Semi tandem stance B 20 seconds  -NH    SLS Unable  -NH      User Key  (r) = Recorded By, (t) = Taken By, (c) = Cosigned By    Initials Name Provider Type    NH America Mejias, PT Physical Therapist                            Therapy Education       10/03/17 1600          Therapy Education    Education Details HEP: See exercise list  -NH      Given HEP;Symptoms/condition management;Pain management;Posture/body mechanics  -NH      Program New  -NH      How Provided Verbal;Demonstration;Written  -NH      Provided to Patient  -NH      Level of Understanding Verbalized;Demonstrated  -NH        User Key  (r) = Recorded By, (t) = Taken By, (c) = Cosigned By    Initials Name Provider Type    NH America Mejias, PT Physical Therapist                PT OP Goals       10/03/17 1600       PT Short Term Goals    STG Date to Achieve 10/24/17  -NH     STG 1 IND and compliant with HEP  -NH     STG 1 Progress New  -NH     STG 2 Patient will demonstrate 25 seconds tandem balance each foot forward  -NH     STG 2 Progress New  -NH     STG 3 Patient will demonstrate 4-/5 B hip ABD strength  -NH     STG 3 Progress New  -NH     STG 4 Paitent will demonstrate 4-/5 B  hip flexion strength  -NH     STG 4 Progress New  -NH     STG 5 Patient will be able to tolerate 45 min treatment session without increase in R hip pain  -NH     STG 5 Progress New  -NH     Long Term Goals    LTG Date to Achieve 11/07/17  -NH     LTG 1 OSWESTRY will be less than 20%  -NH     LTG 1 Progress New  -NH     LTG 2 Patient will demonstrate 4/5 B hip ABD strength  -NH     LTG 2 Progress New  -NH     LTG 3 Patient will demonstrate 4/5 B hip flexion strength measured in SLR position  -NH     LTG 3 Progress New  -NH     LTG 4 Patient will be able to perform standing exercises with TA iso with minimal cuing.  -NH     LTG 4 Progress New  -NH     LTG 5 Patient will report 60% improvement in symptoms since starting therapy.  -NH     LTG 5 Progress New  -NH     LTG 6 Patient will demonstrate 30 sec tandem stance each foot leading.  -NH     LTG 6 Progress New  -NH     Time Calculation    PT Goal Re-Cert Due Date 10/24/17  -NH       User Key  (r) = Recorded By, (t) = Taken By, (c) = Cosigned By    Initials Name Provider Type    NH America Mejias, PT Physical Therapist                PT Assessment/Plan       10/03/17 1600       PT Assessment    Functional Limitations Decreased safety during functional activities;Impaired gait;Limitations in community activities;Limitations in functional capacity and performance;Performance in leisure activities  -NH     Impairments Endurance;Muscle strength;Pain;Poor body mechanics;Posture;Range of motion  -NH     Assessment Comments Patient is a 76 year old female presenting to the clinic several months s/p lumbar surgery. Patient continues to have symptoms in R hip. She attended therapy for a 1x/HEP in August; however, Patient had difficulty progressing and doing the exercises at home. Patient will require skilled physical therapy for education and progression of HEP to ensure proper form and completion. Patient would benefit from therapy to address deficits outlined in evaluation  "in order for patient to return to PLOF safely.   -NH     Rehab Potential Good  -NH     Patient/caregiver participated in establishment of treatment plan and goals Yes  -NH     Patient would benefit from skilled therapy intervention Yes  -NH     PT Plan    PT Frequency 2x/week  -NH     Predicted Duration of Therapy Intervention (days/wks) 6 weeks  -NH     Planned CPT's? PT EVAL LOW COMPLEXITY: 82108;PT RE-EVAL: 70427;PT THER PROC EA 15 MIN: 01640;PT THER ACT EA 15 MIN: 71920;PT MANUAL THERAPY EA 15 MIN: 00817;PT NEUROMUSC RE-EDUCATION EA 15 MIN: 61207;PT GAIT TRAINING EA 15 MIN: 21820;PT ELECTRICAL STIM UNATTEND: ;PT ULTRASOUND EA 15 MIN: 93667;PT THER SUPP EA 15 MIN  -NH     Physical Therapy Interventions (Optional Details) balance training;bed mobility training;home exercise program;lumbar stabilization;manual therapy techniques;modalities;neuromuscular re-education;postural re-education;ROM (Range of Motion);stair training;strengthening;stretching  -NH     PT Plan Comments Core and glute strengthening as tolerated. Body mechanics education.  -NH       User Key  (r) = Recorded By, (t) = Taken By, (c) = Cosigned By    Initials Name Provider Type    NH America Mejias, PT Physical Therapist                  Exercises       10/03/17 1500          Subjective Comments    Subjective Comments Patient reports she came to therapy for a visit and thought she could do the exercises at home. She didn't know if she was doing them right and continues to have a \"catch\" in her low back with half bent over tasks like washing the dishes.   -NH      Subjective Pain    Able to rate subjective pain? yes  -NH      Pre-Treatment Pain Level 3  -NH      Post-Treatment Pain Level 3  -NH      Aquatics    Aquatics performed? No  -NH      Exercise 1    Exercise Name 1 TA isometric hooklying  -NH      Reps 1 10  -NH      Time (Seconds) 1 10  -NH      Additional Comments Demo for HEP  -NH      Exercise 2    Exercise Name 2 TA iso + hip add " hooklying  -NH      Reps 2 10  -NH      Time (Seconds) 2 5  -NH      Additional Comments Demo for HEP  -NH      Exercise 3    Exercise Name 3 Clams sidelying with TA iso  -NH      Reps 3 10  -NH      Additional Comments Red t-band  -NH      Exercise 4    Exercise Name 4 Seated TA with LAQ  -NH      Sets 4 2  -NH      Reps 4 10  -NH      Additional Comments Demo for HEP  -NH      Exercise 5    Exercise Name 5 Seated TA with march  -NH      Sets 5 2  -NH      Reps 5 10  -NH      Additional Comments Demo for HEP  -NH        User Key  (r) = Recorded By, (t) = Taken By, (c) = Cosigned By    Initials Name Provider Type    NH America Mejias, PT Physical Therapist                              Outcome Measures       10/03/17 1500          Modified Oswestry    Modified Oswestry Score/Comments 22%  -NH      Functional Assessment    Outcome Measure Options Modifed Owestry  -NH        User Key  (r) = Recorded By, (t) = Taken By, (c) = Cosigned By    Initials Name Provider Type    NH America Mejias, PT Physical Therapist            Time Calculation:   Start Time: 1520  Stop Time: 1604  Time Calculation (min): 44 min  Total Timed Code Minutes- PT: 0 minute(s)     Therapy Charges for Today     Code Description Service Date Service Provider Modifiers Qty    96425723659 HC PT MOBILITY CURRENT 10/3/2017 America Mejias, PT GP, CJ 1    48273034978 HC PT MOBILITY PROJECTED 10/3/2017 America Mejias, PT GP, CI 1    49104365915 HC PT EVAL LOW COMPLEXITY 3 10/3/2017 America Mejias, PT GP 1          PT G-Codes  PT Professional Judgement Used?: Yes  Outcome Measure Options: Modifed Owestry  Score: 22%  Functional Limitation: Mobility: Walking and moving around  Mobility: Walking and Moving Around Current Status (): At least 20 percent but less than 40 percent impaired, limited or restricted  Mobility: Walking and Moving Around Goal Status (): At least 1 percent but less than 20 percent impaired, limited or restricted          America Mejias, PT  10/3/2017

## 2017-10-10 ENCOUNTER — HOSPITAL ENCOUNTER (OUTPATIENT)
Dept: PHYSICAL THERAPY | Facility: HOSPITAL | Age: 77
Setting detail: THERAPIES SERIES
Discharge: HOME | End: 2017-10-10

## 2017-10-10 DIAGNOSIS — M51.26 OTHER INTERVERTEBRAL DISC DISPLACEMENT, LUMBAR REGION: Primary | ICD-10-CM

## 2017-10-10 PROCEDURE — 97110 THERAPEUTIC EXERCISES: CPT

## 2017-10-10 NOTE — PROGRESS NOTES
Outpatient Physical Therapy Ortho Treatment Note   [facility]     Patient Name: Sara Solis  : 1940  MRN: [id number]  Today's Date: 10/10/2017      Visit Date: 10/10/2017    Subjective Improvement:     0%  Attendance:   Approved:  12 Visits (10-04 thru )         MD follow up:     prn       date:   10-24-17    Visit Dx:    ICD-10-CM ICD-9-CM   1. Other intervertebral disc displacement, lumbar region M51.26 722.10       Patient Active Problem List   Diagnosis   • Low back pain   • Actinic keratosis   • Shoulder pain, left   • Pure hypercholesterolemia   • Nausea   • History of fatigue   • Insomnia        Past Medical History:   Diagnosis Date   • Actinic keratosis    • Allergic rhinitis    • Altered bowel function    • Anxiety state    • Aptyalism    • Attention deficit hyperactivity disorder    • Cough    • Degenerative joint disease involving multiple joints    • Depressive disorder    • Diarrhea    • Disorder of skin    • Fissure in skin    • Foot pain    • Generalized anxiety disorder    • Herpes labialis    • History of bone scan 10/01/2008    DXA BONE DENSITY AXIAL 01111 (2) - Osteopenia of the lumbar spine, which has improved compared to the prior study. Normal bone mineral density of the hips, which has improved compared to the prior study   • History of colonoscopy 2011    Colon endoscopy 05939 (2) - Diverticulosis found in sigmoid colon. Moderate fixation of sigmoid colon. Internal hemorrhoids found in anus.    • History of esophagogastroduodenoscopy 2011    EGD w/ tube 87786 (1) - Normal hypopharynx, GE junction, duodenum, symmetrical & patent pylorus. Normal esophagus. Dilatation performed. Chronic gastritis found in antrum. Biopsy taken.   • History of mammogram 10/01/2008    Mammogram, both breasts (1) - Negative mammogram. Recommend follow-up in 12 months;     • Incontinence of feces    • Increased frequency of urination    • Insomnia    •  Irritable bowel syndrome    • Knee pain    • Lipoma of shoulder    • Low back pain    • Lumbosacral radiculopathy    • Nausea    • Nervous system abnormality     Disorder of the peripheral nervous system     • Osteopenia    • Pain in limb    • Pain in lower limb    • Plantar fasciitis    • Pure hypercholesterolemia    • Restless legs    • Skin lesion     nose   • Spasm    • Spasm of back muscles    • Squamous cell carcinoma of skin    • Stress fracture    • Tear film insufficiency    • Trochanteric bursitis of right hip    • Upper respiratory infection    • Urinary tract infectious disease    • Vertigo         Past Surgical History:   Procedure Laterality Date   • BREAST BIOPSY  12/04/1995    BIOPSY OF BREAST OPEN 06913 (2) - Right,Exscision of mass.Fibroadenoma. Fibrocystic disease, proliferative   • CATARACT EXTRACTION WITH INTRAOCULAR LENS IMPLANT Right 03/07/2006    Remove cataract, insert lens (1) - right   • DILATATION AND CURETTAGE  10/02/1986    D&C (1) - Fractional D&C laparoscopic tubal sterilization. Plus uterine fibroids, approximately 10-12 weeks in size   • FOOT SURGERY  03/20/1990    Foot/toes surgery procedure (1) - Left,Excisional biopsy. Tumor, supposed fibroma, 5th toe   • INJECTION OF MEDICATION  06/08/2015    Kenalog (2) - SERENA Robert   • LIVER BIOPSY  04/26/2000    Needle biopsy of liver (1) - percutaneous liver biopsy;     • OTHER SURGICAL HISTORY  10/17/2002    Chest procedure (2) - Intralesional injection of keloid of chest    • OTHER SURGICAL HISTORY  10/25/2001    Remove axillary lymph nodes (1) - Biopsy, Left axillary adenopathy. 4 lymph nodes with reactive hyperplasia No tumor seen   • OTHER SURGICAL HISTORY  06/17/2013    Biopsy, Each Additional Lesion 83463 (1) - SERENA Robert   • OTHER SURGICAL HISTORY  07/14/2014    Destruction of Premalignant Lesion (1st) 11253 (2)  - SERENA Robert   • OTHER SURGICAL HISTORY  05/10/2016    Drain/Inject Major Joint 85083 (4) - SERENA Robert   • SKIN BIOPSY  07/28/2014     Biopsy of Skin 71578 (2)  - SERENA Robert   • TOTAL ABDOMINAL HYSTERECTOMY WITH SALPINGO OOPHORECTOMY  09/25/1990    incidental appendectomy.Uterine fibroids             PT Ortho       10/10/17 1300    Subjective Comments    Subjective Comments Pt reports pain down R thigh.  -TM    Precautions and Contraindications    Precautions/Limitations no known precautions/limitations  -TM    Subjective Pain    Able to rate subjective pain? yes  -TM    Pre-Treatment Pain Level 3  -TM    Post-Treatment Pain Level 3  -TM      User Key  (r) = Recorded By, (t) = Taken By, (c) = Cosigned By    Initials Name Provider Type     Debra Luque PTA Physical Therapy Assistant                            PT Assessment/Plan       10/10/17 1400       PT Assessment    Functional Limitations Decreased safety during functional activities;Impaired gait;Limitations in community activities;Limitations in functional capacity and performance;Performance in leisure activities  -TM     Impairments Endurance;Muscle strength;Pain;Poor body mechanics;Posture;Range of motion  -TM     Assessment Comments Pt doing better with breathing as she performs trans ab contraction.  Tolerated all therex well.  -TM     Rehab Potential Good  -TM     Patient/caregiver participated in establishment of treatment plan and goals Yes  -TM     Patient would benefit from skilled therapy intervention Yes  -TM     PT Plan    PT Frequency 2x/week  -TM     Predicted Duration of Therapy Intervention (days/wks) 6 weeks  -TM     PT Plan Comments DKTC ball rolls, bridges  -TM       User Key  (r) = Recorded By, (t) = Taken By, (c) = Cosigned By    Initials Name Provider Type     Debra Luque PTA Physical Therapy Assistant                    Exercises       10/10/17 1300          Subjective Comments    Subjective Comments Pt reports pain down R thigh.  -TM      Subjective Pain    Able to rate subjective pain? yes  -TM      Pre-Treatment Pain Level 3  -TM      Post-Treatment  Pain Level 3  -TM      Exercise 1    Exercise Name 1 PRO II for ROM/endurance  -TM      Time (Minutes) 1 8  -TM      Additional Comments level 2  -TM      Exercise 2    Exercise Name 2 hip add with trans abs  -TM      Sets 2 2  -TM      Reps 2 10  -TM      Time (Seconds) 2 5  -TM      Exercise 3    Exercise Name 3 hooklying T Band hip Abd  -TM      Sets 3 2  -TM      Reps 3 10  -TM      Additional Comments red  -TM      Exercise 4    Exercise Name 4 SL clamshell  -TM      Sets 4 2  -TM      Reps 4 10  -TM      Exercise 5    Exercise Name 5 prone TKE/GS  -TM      Sets 5 2  -TM      Reps 5 10  -TM      Exercise 6    Exercise Name 6 prone HS curls  -TM      Sets 6 2  -TM      Reps 6 10  -TM      Exercise 7    Exercise Name 7 seated core stab with march  -TM      Sets 7 2  -TM      Reps 7 10  -TM      Exercise 8    Exercise Name 8 seated core stab with LAQ  -TM      Sets 8 2  -TM      Reps 8 10  -TM        User Key  (r) = Recorded By, (t) = Taken By, (c) = Cosigned By    Initials Name Provider Type    TM Debra Luque, PTA Physical Therapy Assistant                               PT OP Goals       10/10/17 1400       PT Short Term Goals    STG Date to Achieve 10/24/17  -TM     STG 1 IND and compliant with HEP  -TM     STG 1 Progress New  -TM     STG 2 Patient will demonstrate 25 seconds tandem balance each foot forward  -TM     STG 2 Progress New  -TM     STG 3 Patient will demonstrate 4-/5 B hip ABD strength  -TM     STG 3 Progress New  -TM     STG 4 Paitent will demonstrate 4-/5 B hip flexion strength  -TM     STG 4 Progress New  -TM     STG 5 Patient will be able to tolerate 45 min treatment session without increase in R hip pain  -TM     STG 5 Progress New  -TM     Long Term Goals    LTG Date to Achieve 11/07/17  -TM     LTG 1 OSWESTRY will be less than 20%  -TM     LTG 1 Progress New  -TM     LTG 2 Patient will demonstrate 4/5 B hip ABD strength  -TM     LTG 2 Progress New  -TM     LTG 3 Patient will  demonstrate 4/5 B hip flexion strength measured in SLR position  -TM     LTG 3 Progress New  -TM     LTG 4 Patient will be able to perform standing exercises with TA iso with minimal cuing.  -TM     LTG 4 Progress New  -TM     LTG 5 Patient will report 60% improvement in symptoms since starting therapy.  -TM     LTG 5 Progress New  -TM     LTG 6 Patient will demonstrate 30 sec tandem stance each foot leading.  -TM     LTG 6 Progress New  -TM       User Key  (r) = Recorded By, (t) = Taken By, (c) = Cosigned By    Initials Name Provider Type     Debra Luque PTA Physical Therapy Assistant                Therapy Education       10/10/17 1400          Therapy Education    Education Details HEP: hip Abd w/T Band  -TM      Given HEP  -TM      Program Progressed  -TM      How Provided Demonstration;Written  -TM      Provided to Patient  -TM      Level of Understanding Teach back education performed  -TM        User Key  (r) = Recorded By, (t) = Taken By, (c) = Cosigned By    Initials Name Provider Type     Debra Luque PTA Physical Therapy Assistant                Time Calculation:   Start Time: 1342  Stop Time: 1430  Time Calculation (min): 48 min  Total Timed Code Minutes- PT: 48 minute(s)    Therapy Charges for Today     Code Description Service Date Service Provider Modifiers Qty    44607976425 HC PT THER PROC EA 15 MIN 10/10/2017 Debra Luque PTA GP 3                    Debra Luque PTA  10/10/2017

## 2017-10-16 ENCOUNTER — HOSPITAL ENCOUNTER (OUTPATIENT)
Dept: PHYSICAL THERAPY | Facility: HOSPITAL | Age: 77
Setting detail: THERAPIES SERIES
Discharge: HOME | End: 2017-10-16

## 2017-10-16 DIAGNOSIS — M51.26 OTHER INTERVERTEBRAL DISC DISPLACEMENT, LUMBAR REGION: Primary | ICD-10-CM

## 2017-10-16 PROCEDURE — 97110 THERAPEUTIC EXERCISES: CPT | Performed by: PHYSICAL THERAPY ASSISTANT

## 2017-10-16 NOTE — THERAPY TREATMENT NOTE
Outpatient Physical Therapy Ortho Treatment Note  [facility]/ [facility]     Patient Name: Sara Solis  : 1940  MRN: [id number]  Today's Date: 10/16/2017      Visit Date: 10/16/2017    Visits 3/3   Recert Date 10/24/17   MD appointment PRN   Pt reported % of improvement     Medicare cap    Visit Dx:    ICD-10-CM ICD-9-CM   1. Other intervertebral disc displacement, lumbar region M51.26 722.10       Patient Active Problem List   Diagnosis   • Low back pain   • Actinic keratosis   • Shoulder pain, left   • Pure hypercholesterolemia   • Nausea   • History of fatigue   • Insomnia        Past Medical History:   Diagnosis Date   • Actinic keratosis    • Allergic rhinitis    • Altered bowel function    • Anxiety state    • Aptyalism    • Attention deficit hyperactivity disorder    • Cough    • Degenerative joint disease involving multiple joints    • Depressive disorder    • Diarrhea    • Disorder of skin    • Fissure in skin    • Foot pain    • Generalized anxiety disorder    • Herpes labialis    • History of bone scan 10/01/2008    DXA BONE DENSITY AXIAL 65878 (2) - Osteopenia of the lumbar spine, which has improved compared to the prior study. Normal bone mineral density of the hips, which has improved compared to the prior study   • History of colonoscopy 2011    Colon endoscopy 57822 (2) - Diverticulosis found in sigmoid colon. Moderate fixation of sigmoid colon. Internal hemorrhoids found in anus.    • History of esophagogastroduodenoscopy 2011    EGD w/ tube 03180 (1) - Normal hypopharynx, GE junction, duodenum, symmetrical & patent pylorus. Normal esophagus. Dilatation performed. Chronic gastritis found in antrum. Biopsy taken.   • History of mammogram 10/01/2008    Mammogram, both breasts (1) - Negative mammogram. Recommend follow-up in 12 months;     • Incontinence of feces    • Increased frequency of urination    • Insomnia    • Irritable bowel syndrome    • Knee pain    •  Lipoma of shoulder    • Low back pain    • Lumbosacral radiculopathy    • Nausea    • Nervous system abnormality     Disorder of the peripheral nervous system     • Osteopenia    • Pain in limb    • Pain in lower limb    • Plantar fasciitis    • Pure hypercholesterolemia    • Restless legs    • Skin lesion     nose   • Spasm    • Spasm of back muscles    • Squamous cell carcinoma of skin    • Stress fracture    • Tear film insufficiency    • Trochanteric bursitis of right hip    • Upper respiratory infection    • Urinary tract infectious disease    • Vertigo         Past Surgical History:   Procedure Laterality Date   • BREAST BIOPSY  12/04/1995    BIOPSY OF BREAST OPEN 08544 (2) - Right,Exscision of mass.Fibroadenoma. Fibrocystic disease, proliferative   • CATARACT EXTRACTION WITH INTRAOCULAR LENS IMPLANT Right 03/07/2006    Remove cataract, insert lens (1) - right   • DILATATION AND CURETTAGE  10/02/1986    D&C (1) - Fractional D&C laparoscopic tubal sterilization. Plus uterine fibroids, approximately 10-12 weeks in size   • FOOT SURGERY  03/20/1990    Foot/toes surgery procedure (1) - Left,Excisional biopsy. Tumor, supposed fibroma, 5th toe   • INJECTION OF MEDICATION  06/08/2015    Kenalog (2) - SERENA Robert   • LIVER BIOPSY  04/26/2000    Needle biopsy of liver (1) - percutaneous liver biopsy;     • OTHER SURGICAL HISTORY  10/17/2002    Chest procedure (2) - Intralesional injection of keloid of chest    • OTHER SURGICAL HISTORY  10/25/2001    Remove axillary lymph nodes (1) - Biopsy, Left axillary adenopathy. 4 lymph nodes with reactive hyperplasia No tumor seen   • OTHER SURGICAL HISTORY  06/17/2013    Biopsy, Each Additional Lesion 15695 (1) - SERENA Robert   • OTHER SURGICAL HISTORY  07/14/2014    Destruction of Premalignant Lesion (1st) 56332 (2)  - SERENA Robert   • OTHER SURGICAL HISTORY  05/10/2016    Drain/Inject Major Joint 82392 (4) - SERENA Robert   • SKIN BIOPSY  07/28/2014    Biopsy of Skin 87786 (2)  - SERENA Robert   • TOTAL  ABDOMINAL HYSTERECTOMY WITH SALPINGO OOPHORECTOMY  09/25/1990    incidental appendectomy.Uterine fibroids             PT Ortho       10/16/17 1100    Precautions and Contraindications    Precautions/Limitations no known precautions/limitations  -    Subjective Pain    Pre-Treatment Pain Level 1   not bad  -JH    Subjective Pain Comment more dizzy than pain  -JH      User Key  (r) = Recorded By, (t) = Taken By, (c) = Cosigned By    Initials Name Provider Type     Rafa Gauthier PTA Physical Therapy Assistant                            PT Assessment/Plan       10/16/17 1100       PT Assessment    Assessment Comments good tolerance with ther ex this date, no c/o increased pain   -     PT Plan    PT Frequency 2x/week  -     Predicted Duration of Therapy Intervention (days/wks) 6 weeks  -     PT Plan Comments gavin disc as lillian  -       User Key  (r) = Recorded By, (t) = Taken By, (c) = Cosigned By    Initials Name Provider Type     Rafa Gauthier PTA Physical Therapy Assistant                    Exercises       10/16/17 1100          Subjective Comments    Subjective Comments Pt reports she went with her greatgrand kids [place] and had to sit with no back support and that has increaased her pain.  -      Subjective Pain    Able to rate subjective pain? yes  -      Pre-Treatment Pain Level 1   not bad  -      Subjective Pain Comment more dizzy than pain  -      Exercise 1    Exercise Name 1 PRO II for ROM/endurance  -      Time (Minutes) 1 8  -JH      Exercise 2    Exercise Name 2 DKTC with P ball  -      Sets 2 2  -JH      Reps 2 10  -JH      Exercise 3    Exercise Name 3 bridges  -      Sets 3 2  -JH      Reps 3 10  -JH      Exercise 4    Exercise Name 4 SL clamshells  -      Sets 4 2  -JH      Reps 4 10  -JH      Exercise 5    Exercise Name 5 seated HS stretch  -      Sets 5 3  -      Time (Minutes) 5 30  -JH      Exercise 6    Exercise Name 6 B prone ham curl  -      Sets  6 2  -JH      Reps 6 10  -JH      Exercise 7    Exercise Name 7 Prone B hip ext  -JH      Sets 7 2  -      Reps 7 10  -JH      Exercise 8    Exercise Name 8 sit to stand  -      Sets 8 2  -JH      Reps 8 10  -JH      Exercise 9    Exercise Name 9 ST B 3 way SLR  -      Sets 9 2  -JH      Reps 9 10  -JH      Exercise 10    Exercise Name 10 seated hip add w/ GS  -      Sets 10 2  -      Reps 10 10  -      Time (Seconds) 10 5  -JH      Exercise 11    Exercise Name 11 cybex back ext  -JH      Sets 11 2  -JH      Reps 11 10  -JH      Additional Comments 25#  -JH      Exercise 12    Exercise Name 12 seated hip abd  -JH      Sets 12 2  -      Reps 12 10  -JH      Additional Comments red tb  -JH        User Key  (r) = Recorded By, (t) = Taken By, (c) = Cosigned By    Initials Name Provider Type     Rafa Gauthier PTA Physical Therapy Assistant                                       Time Calculation:   Start Time: 1100  Stop Time: 1147  Time Calculation (min): 47 min    Therapy Charges for Today     Code Description Service Date Service Provider Modifiers Qty    60935890368 HC PT THER PROC EA 15 MIN 10/16/2017 Rafa Gauthier PTA GP 3                    Rafa Gauthier PTA  10/16/2017

## 2017-10-19 ENCOUNTER — HOSPITAL ENCOUNTER (OUTPATIENT)
Dept: PHYSICAL THERAPY | Facility: HOSPITAL | Age: 77
Setting detail: THERAPIES SERIES
Discharge: HOME | End: 2017-10-19

## 2017-10-19 DIAGNOSIS — M51.16 INTERVERTEBRAL DISC DISORDERS WITH RADICULOPATHY, LUMBAR REGION: ICD-10-CM

## 2017-10-19 DIAGNOSIS — M51.26 OTHER INTERVERTEBRAL DISC DISPLACEMENT, LUMBAR REGION: Primary | ICD-10-CM

## 2017-10-19 PROCEDURE — 97110 THERAPEUTIC EXERCISES: CPT | Performed by: PHYSICAL THERAPIST

## 2017-10-19 NOTE — PROGRESS NOTES
Outpatient Physical Therapy Ortho Treatment Note  [facility]     Patient Name: Sara Solis  : 1940  MRN: [id number]  Today's Date: 10/19/2017        Attendance /   Authorized 12   Pre Rx pain 4   Post Rx pain    % improvement none   MD follow up PRN   Recert date 10/27/17   Hard of hearing      Visit Date: 10/19/2017    Visit Dx:    ICD-10-CM ICD-9-CM   1. Other intervertebral disc displacement, lumbar region M51.26 722.10   2. Intervertebral disc disorders with radiculopathy, lumbar region M51.16 724.4       Patient Active Problem List   Diagnosis   • Low back pain   • Actinic keratosis   • Shoulder pain, left   • Pure hypercholesterolemia   • Nausea   • History of fatigue   • Insomnia        Past Medical History:   Diagnosis Date   • Actinic keratosis    • Allergic rhinitis    • Altered bowel function    • Anxiety state    • Aptyalism    • Attention deficit hyperactivity disorder    • Cough    • Degenerative joint disease involving multiple joints    • Depressive disorder    • Diarrhea    • Disorder of skin    • Fissure in skin    • Foot pain    • Generalized anxiety disorder    • Herpes labialis    • History of bone scan 10/01/2008    DXA BONE DENSITY AXIAL 31717 (2) - Osteopenia of the lumbar spine, which has improved compared to the prior study. Normal bone mineral density of the hips, which has improved compared to the prior study   • History of colonoscopy 2011    Colon endoscopy 10139 (2) - Diverticulosis found in sigmoid colon. Moderate fixation of sigmoid colon. Internal hemorrhoids found in anus.    • History of esophagogastroduodenoscopy 2011    EGD w/ tube 67944 (1) - Normal hypopharynx, GE junction, duodenum, symmetrical & patent pylorus. Normal esophagus. Dilatation performed. Chronic gastritis found in antrum. Biopsy taken.   • History of mammogram 10/01/2008    Mammogram, both breasts (1) - Negative mammogram. Recommend follow-up in 12 months;     •  Incontinence of feces    • Increased frequency of urination    • Insomnia    • Irritable bowel syndrome    • Knee pain    • Lipoma of shoulder    • Low back pain    • Lumbosacral radiculopathy    • Nausea    • Nervous system abnormality     Disorder of the peripheral nervous system     • Osteopenia    • Pain in limb    • Pain in lower limb    • Plantar fasciitis    • Pure hypercholesterolemia    • Restless legs    • Skin lesion     nose   • Spasm    • Spasm of back muscles    • Squamous cell carcinoma of skin    • Stress fracture    • Tear film insufficiency    • Trochanteric bursitis of right hip    • Upper respiratory infection    • Urinary tract infectious disease    • Vertigo         Past Surgical History:   Procedure Laterality Date   • BREAST BIOPSY  12/04/1995    BIOPSY OF BREAST OPEN 09732 (2) - Right,Exscision of mass.Fibroadenoma. Fibrocystic disease, proliferative   • CATARACT EXTRACTION WITH INTRAOCULAR LENS IMPLANT Right 03/07/2006    Remove cataract, insert lens (1) - right   • DILATATION AND CURETTAGE  10/02/1986    D&C (1) - Fractional D&C laparoscopic tubal sterilization. Plus uterine fibroids, approximately 10-12 weeks in size   • FOOT SURGERY  03/20/1990    Foot/toes surgery procedure (1) - Left,Excisional biopsy. Tumor, supposed fibroma, 5th toe   • INJECTION OF MEDICATION  06/08/2015    Kenalog (2) - SERENA Robert   • LIVER BIOPSY  04/26/2000    Needle biopsy of liver (1) - percutaneous liver biopsy;     • OTHER SURGICAL HISTORY  10/17/2002    Chest procedure (2) - Intralesional injection of keloid of chest    • OTHER SURGICAL HISTORY  10/25/2001    Remove axillary lymph nodes (1) - Biopsy, Left axillary adenopathy. 4 lymph nodes with reactive hyperplasia No tumor seen   • OTHER SURGICAL HISTORY  06/17/2013    Biopsy, Each Additional Lesion 90522 (1) - SERENA Robert   • OTHER SURGICAL HISTORY  07/14/2014    Destruction of Premalignant Lesion (1st) 76710 (2)  - SERENA Robert   • OTHER SURGICAL HISTORY  05/10/2016     Drain/Inject Major Joint 20610 (4) - SERENA Robert   • SKIN BIOPSY  07/28/2014    Biopsy of Skin 64604 (2)  - SERENA Robert   • TOTAL ABDOMINAL HYSTERECTOMY WITH SALPINGO OOPHORECTOMY  09/25/1990    incidental appendectomy.Uterine fibroids                             PT Assessment/Plan       10/19/17 1536       PT Plan    PT Frequency 2x/week  -DD     Predicted Duration of Therapy Intervention (days/wks) 4 weeks  -DD     PT Plan Comments gavin disc core activities, recheck next week  -DD       User Key  (r) = Recorded By, (t) = Taken By, (c) = Cosigned By    Initials Name Provider Type    DD Zully Purcell, PT Physical Therapist                    Exercises       10/19/17 1515          Subjective Comments    Subjective Comments Pt reports nerve conduction studeyn done, was left laying on the table flat of her back while waiting.  -DD      Subjective Pain    Able to rate subjective pain? yes  -DD      Exercise 1    Exercise Name 1 PRO II for ROM/endurance  -DD      Time (Minutes) 1 10  -DD      Additional Comments L3.5  -DD      Exercise 2    Exercise Name 2 DKTC with P ball  -DD      Sets 2 2  -DD      Reps 2 10  -DD      Exercise 3    Exercise Name 3 bridges  -DD      Sets 3 2  -DD      Reps 3 10  -DD      Exercise 4    Exercise Name 4 SL clamshells  -DD      Sets 4 2  -DD      Reps 4 10  -DD      Exercise 5    Exercise Name 5 seated HS stretch  -DD      Sets 5 3  -DD      Time (Minutes) 5 30  -DD      Exercise 6    Exercise Name 6 B prone ham curl  -DD      Sets 6 2  -DD      Reps 6 10  -DD      Exercise 7    Exercise Name 7 PRone TKE valentín  -DD      Sets 7 2  -DD      Reps 7 10  -DD      Exercise 8    Exercise Name 8 sit to stand  -DD      Sets 8 2  -DD      Reps 8 10  -DD      Exercise 9    Exercise Name 9 ST B 3 way SLR  -DD      Sets 9 2  -DD      Reps 9 10  -DD      Exercise 10    Exercise Name 10 prone hip ER//IR  -DD      Reps 10 20  -DD      Exercise 11    Exercise Name 11 cybex back ext  -DD      Sets 11 2   -DD      Reps 11 10  -DD        User Key  (r) = Recorded By, (t) = Taken By, (c) = Cosigned By    Initials Name Provider Type    LORIN Purcell, PT Physical Therapist                               PT OP Goals       10/19/17 1515       PT Short Term Goals    STG Date to Achieve 10/24/17  -DD     STG 1 IND and compliant with HEP  -DD     STG 1 Progress New  -DD     STG 2 Patient will demonstrate 25 seconds tandem balance each foot forward  -DD     STG 2 Progress New  -DD     STG 3 Patient will demonstrate 4-/5 B hip ABD strength  -DD     STG 3 Progress New  -DD     STG 4 Paitent will demonstrate 4-/5 B hip flexion strength  -DD     STG 4 Progress New  -DD     STG 5 Patient will be able to tolerate 45 min treatment session without increase in R hip pain  -DD     STG 5 Progress New  -DD     Long Term Goals    LTG Date to Achieve 11/07/17  -DD     LTG 1 OSWESTRY will be less than 20%  -DD     LTG 1 Progress New  -DD     LTG 2 Patient will demonstrate 4/5 B hip ABD strength  -DD     LTG 2 Progress New  -DD     LTG 3 Patient will demonstrate 4/5 B hip flexion strength measured in SLR position  -DD     LTG 3 Progress New  -DD     LTG 4 Patient will be able to perform standing exercises with TA iso with minimal cuing.  -DD     LTG 4 Progress New  -DD     LTG 5 Patient will report 60% improvement in symptoms since starting therapy.  -DD     LTG 5 Progress New  -DD     LTG 6 Patient will demonstrate 30 sec tandem stance each foot leading.  -DD     LTG 6 Progress New  -DD       User Key  (r) = Recorded By, (t) = Taken By, (c) = Cosigned By    Initials Name Provider Type    LORIN Purcell, PT Physical Therapist                    Time Calculation:   Start Time: 1315  Stop Time: 1604  Time Calculation (min): 169 min    Therapy Charges for Today     Code Description Service Date Service Provider Modifiers Qty    47605121871  PT THER PROC EA 15 MIN 10/19/2017 Zully Purcell, PT GP 3                     Zully Purcell, PT  10/19/2017

## 2017-10-23 ENCOUNTER — HOSPITAL ENCOUNTER (OUTPATIENT)
Dept: PHYSICAL THERAPY | Facility: HOSPITAL | Age: 77
Setting detail: THERAPIES SERIES
Discharge: HOME | End: 2017-10-23

## 2017-10-23 DIAGNOSIS — M51.26 OTHER INTERVERTEBRAL DISC DISPLACEMENT, LUMBAR REGION: Primary | ICD-10-CM

## 2017-10-23 PROCEDURE — 97110 THERAPEUTIC EXERCISES: CPT

## 2017-10-23 NOTE — PROGRESS NOTES
Outpatient Physical Therapy Ortho Treatment Note   [facility]     Patient Name: Sara Solis  : 1940  MRN: [id number]  Today's Date: 10/23/2017      Visit Date: 10/23/2017    Subjective Improvement:     0%  Attendance:   Approved:   12 Visits        MD follow up:   prn         date:  10-27-17       Visit Dx:    ICD-10-CM ICD-9-CM   1. Other intervertebral disc displacement, lumbar region M51.26 722.10       Patient Active Problem List   Diagnosis   • Low back pain   • Actinic keratosis   • Shoulder pain, left   • Pure hypercholesterolemia   • Nausea   • History of fatigue   • Insomnia        Past Medical History:   Diagnosis Date   • Actinic keratosis    • Allergic rhinitis    • Altered bowel function    • Anxiety state    • Aptyalism    • Attention deficit hyperactivity disorder    • Cough    • Degenerative joint disease involving multiple joints    • Depressive disorder    • Diarrhea    • Disorder of skin    • Fissure in skin    • Foot pain    • Generalized anxiety disorder    • Herpes labialis    • History of bone scan 10/01/2008    DXA BONE DENSITY AXIAL 42813 (2) - Osteopenia of the lumbar spine, which has improved compared to the prior study. Normal bone mineral density of the hips, which has improved compared to the prior study   • History of colonoscopy 2011    Colon endoscopy 15113 (2) - Diverticulosis found in sigmoid colon. Moderate fixation of sigmoid colon. Internal hemorrhoids found in anus.    • History of esophagogastroduodenoscopy 2011    EGD w/ tube 51861 (1) - Normal hypopharynx, GE junction, duodenum, symmetrical & patent pylorus. Normal esophagus. Dilatation performed. Chronic gastritis found in antrum. Biopsy taken.   • History of mammogram 10/01/2008    Mammogram, both breasts (1) - Negative mammogram. Recommend follow-up in 12 months;     • Incontinence of feces    • Increased frequency of urination    • Insomnia    • Irritable bowel syndrome     • Knee pain    • Lipoma of shoulder    • Low back pain    • Lumbosacral radiculopathy    • Nausea    • Nervous system abnormality     Disorder of the peripheral nervous system     • Osteopenia    • Pain in limb    • Pain in lower limb    • Plantar fasciitis    • Pure hypercholesterolemia    • Restless legs    • Skin lesion     nose   • Spasm    • Spasm of back muscles    • Squamous cell carcinoma of skin    • Stress fracture    • Tear film insufficiency    • Trochanteric bursitis of right hip    • Upper respiratory infection    • Urinary tract infectious disease    • Vertigo         Past Surgical History:   Procedure Laterality Date   • BREAST BIOPSY  12/04/1995    BIOPSY OF BREAST OPEN 16390 (2) - Right,Exscision of mass.Fibroadenoma. Fibrocystic disease, proliferative   • CATARACT EXTRACTION WITH INTRAOCULAR LENS IMPLANT Right 03/07/2006    Remove cataract, insert lens (1) - right   • DILATATION AND CURETTAGE  10/02/1986    D&C (1) - Fractional D&C laparoscopic tubal sterilization. Plus uterine fibroids, approximately 10-12 weeks in size   • FOOT SURGERY  03/20/1990    Foot/toes surgery procedure (1) - Left,Excisional biopsy. Tumor, supposed fibroma, 5th toe   • INJECTION OF MEDICATION  06/08/2015    Kenalog (2) - SERENA Robert   • LIVER BIOPSY  04/26/2000    Needle biopsy of liver (1) - percutaneous liver biopsy;     • OTHER SURGICAL HISTORY  10/17/2002    Chest procedure (2) - Intralesional injection of keloid of chest    • OTHER SURGICAL HISTORY  10/25/2001    Remove axillary lymph nodes (1) - Biopsy, Left axillary adenopathy. 4 lymph nodes with reactive hyperplasia No tumor seen   • OTHER SURGICAL HISTORY  06/17/2013    Biopsy, Each Additional Lesion 53663 (1) - SERENA Robert   • OTHER SURGICAL HISTORY  07/14/2014    Destruction of Premalignant Lesion (1st) 78285 (2)  - SERENA Robert   • OTHER SURGICAL HISTORY  05/10/2016    Drain/Inject Major Joint 69443 (4) - SERENA Robert   • SKIN BIOPSY  07/28/2014    Biopsy of Skin 59314 (2)   Keegan Robert   • TOTAL ABDOMINAL HYSTERECTOMY WITH SALPINGO OOPHORECTOMY  09/25/1990    incidental appendectomy.Uterine fibroids             PT Ortho       10/23/17 1300    Precautions and Contraindications    Precautions/Limitations no known precautions/limitations  -TM    Subjective Pain    Post-Treatment Pain Level 1  -TM      User Key  (r) = Recorded By, (t) = Taken By, (c) = Cosigned By    Initials Name Provider Type    TM Debra Luque PTA Physical Therapy Assistant                            PT Assessment/Plan       10/23/17 1300       PT Assessment    Functional Limitations Decreased safety during functional activities;Impaired gait;Limitations in community activities;Limitations in functional capacity and performance;Performance in leisure activities  -TM     Impairments Endurance;Muscle strength;Pain;Poor body mechanics;Posture;Range of motion  -TM     Assessment Comments Pt required min cues for seated core stab.  Tolerated therex well.    -TM     Rehab Potential Good  -TM     Patient/caregiver participated in establishment of treatment plan and goals Yes  -TM     Patient would benefit from skilled therapy intervention Yes  -TM     PT Plan    PT Frequency 2x/week  -TM     Predicted Duration of Therapy Intervention (days/wks) 4 weeks  -TM     PT Plan Comments gavin disc seated core  -TM       User Key  (r) = Recorded By, (t) = Taken By, (c) = Cosigned By    Initials Name Provider Type     Debra Luque PTA Physical Therapy Assistant                Modalities       10/23/17 1300          Moist Heat    MH Applied Yes  -TM      Location low back  -TM      Rx Minutes 15 mins  -TM      MH S/P Rx Yes  -TM        User Key  (r) = Recorded By, (t) = Taken By, (c) = Cosigned By    Initials Name Provider Type     Debra Luque PTA Physical Therapy Assistant                Exercises       10/23/17 1300          Subjective Comments    Subjective Comments Picked up a lot of sticks in yard Sat & hurt  pretty bad yesterday.    -TM      Subjective Pain    Able to rate subjective pain? yes  -TM      Pre-Treatment Pain Level 4  -TM      Post-Treatment Pain Level 1  -TM      Exercise 1    Exercise Name 1 PRO II for ROM/endurance  -TM      Time (Minutes) 1 8  -TM      Additional Comments level 3.5  -TM      Exercise 2    Exercise Name 2 SKTC stretch  -TM      Sets 2 2  -TM      Time (Seconds) 2 30  -TM      Exercise 3    Exercise Name 3 bridges  -TM      Sets 3 2  -TM      Reps 3 10  -TM      Exercise 4    Exercise Name 4 DKTC ball roll with trans abs  -TM      Sets 4 2  -TM      Reps 4 10  -TM      Exercise 5    Exercise Name 5 SL clamshells B  -TM      Sets 5 2  -TM      Reps 5 10  -TM      Exercise 6    Exercise Name 6 prone TKE/GS  -TM      Sets 6 2  -TM      Reps 6 10  -TM      Exercise 7    Exercise Name 7 prone B HS curls  -TM      Sets 7 2  -TM      Reps 7 10  -TM      Exercise 8    Exercise Name 8 seated core stab with T Band rows  -TM      Sets 8 2  -TM      Reps 8 10  -TM      Additional Comments red  -TM      Exercise 9    Exercise Name 9 seated core stab with T Band ext  -TM      Sets 9 2  -TM      Reps 9 10  -TM      Additional Comments red  -TM      Exercise 10    Exercise Name 10 seated core stab march  -TM      Sets 10 2  -TM      Reps 10 10  -TM      Exercise 11    Exercise Name 11 seated core stab LAQ  -TM      Sets 11 2  -TM      Reps 11 10  -TM        User Key  (r) = Recorded By, (t) = Taken By, (c) = Cosigned By    Initials Name Provider Type    TM Debra Luque PTA Physical Therapy Assistant                               PT OP Goals       10/23/17 1300       PT Short Term Goals    STG Date to Achieve 10/24/17  -TM     STG 1 IND and compliant with HEP  -TM     STG 1 Progress Ongoing  -TM     STG 2 Patient will demonstrate 25 seconds tandem balance each foot forward  -TM     STG 2 Progress New  -TM     STG 3 Patient will demonstrate 4-/5 B hip ABD strength  -TM     STG 3 Progress Ongoing  -TM      STG 4 Paitent will demonstrate 4-/5 B hip flexion strength  -     STG 4 Progress Ongoing  -     STG 5 Patient will be able to tolerate 45 min treatment session without increase in R hip pain  -     STG 5 Progress Ongoing  -     Long Term Goals    LTG Date to Achieve 11/07/17  -     LTG 1 OSWESTRY will be less than 20%  -     LTG 1 Progress Ongoing  -     LTG 2 Patient will demonstrate 4/5 B hip ABD strength  -     LTG 2 Progress Ongoing  -     LTG 3 Patient will demonstrate 4/5 B hip flexion strength measured in SLR position  -     LTG 3 Progress Ongoing  -     LTG 4 Patient will be able to perform standing exercises with TA iso with minimal cuing.  -     LTG 4 Progress Ongoing  -     LTG 5 Patient will report 60% improvement in symptoms since starting therapy.  -     LTG 5 Progress Ongoing  -     LTG 6 Patient will demonstrate 30 sec tandem stance each foot leading.  -     LTG 6 Progress Ongoing  -       User Key  (r) = Recorded By, (t) = Taken By, (c) = Cosigned By    Initials Name Provider Type     Debra Luque PTA Physical Therapy Assistant                    Time Calculation:   Start Time: 1345  Stop Time: 1445  Time Calculation (min): 60 min  PT Non-Billable Time (min): 15 min  Total Timed Code Minutes- PT: 45 minute(s)    Therapy Charges for Today     Code Description Service Date Service Provider Modifiers Qty    31768787529 HC PT THER PROC EA 15 MIN 10/23/2017 Debra Luque PTA GP 3    77797210027 HC PT THER SUPP EA 15 MIN 10/23/2017 Debra Luque PTA GP 1                    Debra Luque PTA  10/23/2017

## 2017-10-26 ENCOUNTER — HOSPITAL ENCOUNTER (OUTPATIENT)
Dept: PHYSICAL THERAPY | Facility: HOSPITAL | Age: 77
Setting detail: THERAPIES SERIES
Discharge: HOME | End: 2017-10-26

## 2017-10-26 DIAGNOSIS — M51.26 OTHER INTERVERTEBRAL DISC DISPLACEMENT, LUMBAR REGION: Primary | ICD-10-CM

## 2017-10-26 DIAGNOSIS — M51.16 INTERVERTEBRAL DISC DISORDERS WITH RADICULOPATHY, LUMBAR REGION: ICD-10-CM

## 2017-10-26 PROCEDURE — G8979 MOBILITY GOAL STATUS: HCPCS | Performed by: PHYSICAL THERAPIST

## 2017-10-26 PROCEDURE — G8978 MOBILITY CURRENT STATUS: HCPCS | Performed by: PHYSICAL THERAPIST

## 2017-10-26 PROCEDURE — 97110 THERAPEUTIC EXERCISES: CPT | Performed by: PHYSICAL THERAPIST

## 2017-10-26 NOTE — PROGRESS NOTES
Outpatient Physical Therapy Ortho Progress Note  [facility]     Patient Name: Sara Solis  : 1940  MRN: [id number]  Today's Date: 10/26/2017      Visit Date: 10/26/2017      Attendance    Authorized 12   Pre Rx pain 3   Post Rx pain 3   % improvement Some strength   MD follow up PRN   Recert date            Visit Dx:    ICD-10-CM ICD-9-CM   1. Other intervertebral disc displacement, lumbar region M51.26 722.10   2. Intervertebral disc disorders with radiculopathy, lumbar region M51.16 724.4       Patient Active Problem List   Diagnosis   • Low back pain   • Actinic keratosis   • Shoulder pain, left   • Pure hypercholesterolemia   • Nausea   • History of fatigue   • Insomnia        Past Medical History:   Diagnosis Date   • Actinic keratosis    • Allergic rhinitis    • Altered bowel function    • Anxiety state    • Aptyalism    • Attention deficit hyperactivity disorder    • Cough    • Degenerative joint disease involving multiple joints    • Depressive disorder    • Diarrhea    • Disorder of skin    • Fissure in skin    • Foot pain    • Generalized anxiety disorder    • Herpes labialis    • History of bone scan 10/01/2008    DXA BONE DENSITY AXIAL 48080 (2) - Osteopenia of the lumbar spine, which has improved compared to the prior study. Normal bone mineral density of the hips, which has improved compared to the prior study   • History of colonoscopy 2011    Colon endoscopy 73364 (2) - Diverticulosis found in sigmoid colon. Moderate fixation of sigmoid colon. Internal hemorrhoids found in anus.    • History of esophagogastroduodenoscopy 2011    EGD w/ tube 59810 (1) - Normal hypopharynx, GE junction, duodenum, symmetrical & patent pylorus. Normal esophagus. Dilatation performed. Chronic gastritis found in antrum. Biopsy taken.   • History of mammogram 10/01/2008    Mammogram, both breasts (1) - Negative mammogram. Recommend follow-up in 12 months;     • Incontinence of  feces    • Increased frequency of urination    • Insomnia    • Irritable bowel syndrome    • Knee pain    • Lipoma of shoulder    • Low back pain    • Lumbosacral radiculopathy    • Nausea    • Nervous system abnormality     Disorder of the peripheral nervous system     • Osteopenia    • Pain in limb    • Pain in lower limb    • Plantar fasciitis    • Pure hypercholesterolemia    • Restless legs    • Skin lesion     nose   • Spasm    • Spasm of back muscles    • Squamous cell carcinoma of skin    • Stress fracture    • Tear film insufficiency    • Trochanteric bursitis of right hip    • Upper respiratory infection    • Urinary tract infectious disease    • Vertigo         Past Surgical History:   Procedure Laterality Date   • BREAST BIOPSY  12/04/1995    BIOPSY OF BREAST OPEN 47217 (2) - Right,Exscision of mass.Fibroadenoma. Fibrocystic disease, proliferative   • CATARACT EXTRACTION WITH INTRAOCULAR LENS IMPLANT Right 03/07/2006    Remove cataract, insert lens (1) - right   • DILATATION AND CURETTAGE  10/02/1986    D&C (1) - Fractional D&C laparoscopic tubal sterilization. Plus uterine fibroids, approximately 10-12 weeks in size   • FOOT SURGERY  03/20/1990    Foot/toes surgery procedure (1) - Left,Excisional biopsy. Tumor, supposed fibroma, 5th toe   • INJECTION OF MEDICATION  06/08/2015    Kenalog (2) - SERENA Robert   • LIVER BIOPSY  04/26/2000    Needle biopsy of liver (1) - percutaneous liver biopsy;     • OTHER SURGICAL HISTORY  10/17/2002    Chest procedure (2) - Intralesional injection of keloid of chest    • OTHER SURGICAL HISTORY  10/25/2001    Remove axillary lymph nodes (1) - Biopsy, Left axillary adenopathy. 4 lymph nodes with reactive hyperplasia No tumor seen   • OTHER SURGICAL HISTORY  06/17/2013    Biopsy, Each Additional Lesion 34319 (1) - SERENA Robert   • OTHER SURGICAL HISTORY  07/14/2014    Destruction of Premalignant Lesion (1st) 22577 (2)  - SERENA Robert   • OTHER SURGICAL HISTORY  05/10/2016    Drain/Inject  Major Joint 79000 (4) - SERENA Robert   • SKIN BIOPSY  07/28/2014    Biopsy of Skin 14562 (2)  - SERENA Robert   • TOTAL ABDOMINAL HYSTERECTOMY WITH SALPINGO OOPHORECTOMY  09/25/1990    incidental appendectomy.Uterine fibroids             PT Ortho       10/26/17 1300    Lumbosacral Palpation    Piriformis Bilateral:;Tender   very minimal  -DD    Left Hip    Hip Flexion Gross Movement (4-/5) good minus  -DD    Hip Extension Gross Movement (4-/5) good minus  -DD    Hip ABduction Gross Movement (4-/5) good minus  -DD    Right Hip    Hip Flexion Gross Movement (4-/5) good minus  -DD    Hip Extension Gross Movement (4-/5) good minus  -DD    Hip ABduction Gross Movement (4-/5) good minus  -DD    Left Knee    Knee Extension Gross Movement (4+/5) good plus  -DD    Knee Flexion Hamstrings (4+/5) good plus  -DD    Right Knee    Knee Extension Gross Movement (4+/5) good plus  -DD    Knee Flexion Gross Movement (4+/5) good plus  -DD      User Key  (r) = Recorded By, (t) = Taken By, (c) = Cosigned By    Initials Name Provider Type    LORIN Purcell, PT Physical Therapist                            PT Assessment/Plan       10/26/17 1420 10/26/17 1332    PT Assessment    Assessment Comments  Ptr does well with new exercises. Independent in ones she has done previous  -DD    Please refer to paper survey for additional self-reported information  Yes  -DD    Rehab Potential  Good  -DD    Patient/caregiver participated in establishment of treatment plan and goals  Yes  -DD    Patient would benefit from skilled therapy intervention  Yes  -DD    PT Plan    PT Frequency  2x/week  -DD    Predicted Duration of Therapy Intervention (days/wks)  3 weeks  -DD    PT Plan Comments Work on standing core/ balance tandem stance for goal attainment.  -DD Continue core stabilization. Manual as needed.  -DD      User Key  (r) = Recorded By, (t) = Taken By, (c) = Cosigned By    Initials Name Provider Type    LORIN Purcell, PT Physical  Therapist                Modalities       10/26/17 1300          Moist Heat    Location low back  -DD      Rx Minutes 15 mins  -DD      MH S/P Rx Yes  -DD        User Key  (r) = Recorded By, (t) = Taken By, (c) = Cosigned By    Initials Name Provider Type    DD Zully Purcell, PT Physical Therapist                Exercises       10/26/17 1300          Subjective Comments    Subjective Comments Pt reports a catch in her back  -DD      Subjective Pain    Able to rate subjective pain? yes  -DD      Pre-Treatment Pain Level 3  -DD      Exercise 1    Exercise Name 1 PRO II for ROM/endurance  -DD      Time (Minutes) 1 10  -DD      Additional Comments L3.5  -DD      Exercise 2    Exercise Name 2 SKTC stretch  -DD      Sets 2 2  -DD      Time (Seconds) 2 30  -DD      Exercise 3    Exercise Name 3 bridges on ball  -DD      Sets 3 2  -DD      Reps 3 10  -DD      Additional Comments yellow  -DD      Exercise 4    Exercise Name 4 DKTC ball roll with trans abs  -DD      Sets 4 2  -DD      Reps 4 10  -DD      Additional Comments yellow  -DD      Exercise 5    Exercise Name 5 seated hip abd with red theraband  -DD      Sets 5 2  -DD      Reps 5 10  -DD      Additional Comments red  -DD      Exercise 6    Exercise Name 6 prone TKE/GS  -DD      Sets 6 2  -DD      Reps 6 10  -DD      Exercise 7    Exercise Name 7 gavin disc LAQ  -DD      Sets 7 2  -DD      Reps 7 10  -DD      Exercise 8    Exercise Name 8 gavin disc march  -DD      Sets 8 2  -DD      Reps 8 10  -DD      Exercise 9    Exercise Name 9 gavin disc HC with red theraband  -DD      Sets 9 2  -DD      Reps 9 10  -DD      Additional Comments red  -DD      Exercise 10    Exercise Name 10 trunk ROM  -DD      Sets 10 --  -DD      Reps 10 --  -DD      Exercise 11    Exercise Name 11 Isometric hip resistance  -DD      Sets 11 --  -DD      Reps 11 --  -DD      Time (Minutes) 11 3  -DD        User Key  (r) = Recorded By, (t) = Taken By, (c) = Cosigned By    Initials Name  Provider Type    LORIN Purcell, PT Physical Therapist                               PT OP Goals       10/26/17 1416 10/26/17 1300    PT Short Term Goals    STG Date to Achieve  10/24/17  -DD    STG 1  IND and compliant with HEP  -DD    STG 1 Progress  Progressing  -DD    STG 2  Patient will demonstrate 25 seconds tandem balance each foot forward  -DD    STG 2 Progress  New  -DD    STG 3  Patient will demonstrate 4-/5 B hip ABD strength  -DD    STG 3 Progress  Progressing  -DD    STG 4  Paitent will demonstrate 4-/5 B hip flexion strength  -DD    STG 4 Progress  Met  -DD    STG 5  Patient will be able to tolerate 45 min treatment session without increase in R hip pain  -DD    STG 5 Progress  Met  -DD    Long Term Goals    LTG Date to Achieve  11/07/17  -DD    LTG 1  OSWESTRY will be less than 20%  -DD    LTG 1 Progress  Met  -DD    LTG 1 Progress Comments  18%  -DD    LTG 2  Patient will demonstrate 4/5 B hip ABD strength  -DD    LTG 2 Progress  Ongoing  -DD    LTG 3  Patient will demonstrate 4/5 B hip flexion strength measured in SLR position  -DD    LTG 3 Progress  Ongoing  -DD    LTG 4  Patient will be able to perform standing exercises with TA iso with minimal cuing.  -DD    LTG 4 Progress  Ongoing  -DD    LTG 5  Patient will report 40% improvement in symptoms since starting therapy.  -DD    LTG 5 Progress  Ongoing  -DD    LTG 6  Patient will demonstrate 30 sec tandem stance each foot leading.  -DD    LTG 6 Progress  Ongoing  -DD    Time Calculation    PT Goal Re-Cert Due Date 11/16/17  -DD       User Key  (r) = Recorded By, (t) = Taken By, (c) = Cosigned By    Initials Name Provider Type    LORIN Purcell, PT Physical Therapist                    Outcome Measures       10/26/17 1416          Modified Oswestry    Modified Oswestry Score/Comments 18%  -DD        User Key  (r) = Recorded By, (t) = Taken By, (c) = Cosigned By    Initials Name Provider Type    LORIN Purcell, PT  Physical Therapist            Time Calculation:   Start Time: 1300  Stop Time: 1402  Time Calculation (min): 62 min  Total Timed Code Minutes- PT: 43 minute(s)    Therapy Charges for Today     Code Description Service Date Service Provider Modifiers Qty    74422554207 HC PT MOBILITY CURRENT 10/26/2017 Zully Purcell, PT GP, CI 1    06809852626 HC PT MOBILITY PROJECTED 10/26/2017 Zully Purcell, PT GP, CI 1    74561537779 HC PT THER PROC EA 15 MIN 10/26/2017 Zully Purcell, PT GP 3    55909181884 HC PT THER SUPP EA 15 MIN 10/26/2017 Zully Purcell, PT GP 1          PT G-Codes  PT Professional Judgement Used?: Yes  Outcome Measure Options: Jennifer Escamilla  Score: 18%  Mobility: Walking and Moving Around Current Status (): At least 1 percent but less than 20 percent impaired, limited or restricted  Mobility: Walking and Moving Around Goal Status (): At least 1 percent but less than 20 percent impaired, limited or restricted         Zully Purcell, PT  10/26/2017

## 2017-10-30 ENCOUNTER — HOSPITAL ENCOUNTER (OUTPATIENT)
Dept: PHYSICAL THERAPY | Facility: HOSPITAL | Age: 77
Setting detail: THERAPIES SERIES
End: 2017-10-30

## 2017-11-02 ENCOUNTER — HOSPITAL ENCOUNTER (OUTPATIENT)
Dept: PHYSICAL THERAPY | Facility: HOSPITAL | Age: 77
Setting detail: THERAPIES SERIES
Discharge: HOME | End: 2017-11-02

## 2017-11-02 DIAGNOSIS — M51.26 OTHER INTERVERTEBRAL DISC DISPLACEMENT, LUMBAR REGION: Primary | ICD-10-CM

## 2017-11-02 PROCEDURE — 97110 THERAPEUTIC EXERCISES: CPT

## 2017-11-02 NOTE — PROGRESS NOTES
"    Outpatient Physical Therapy Ortho Treatment Note   [facility]     Patient Name: Sara Solis  : 1940  MRN: [id number]  Today's Date: 2017      Visit Date: 2017    Subjective Improvement:   \"some strength\"    Attendance:   Approved:  12 Visits         MD follow up:   prn         date:  17       Visit Dx:    ICD-10-CM ICD-9-CM   1. Other intervertebral disc displacement, lumbar region M51.26 722.10       Patient Active Problem List   Diagnosis   • Low back pain   • Actinic keratosis   • Shoulder pain, left   • Pure hypercholesterolemia   • Nausea   • History of fatigue   • Insomnia        Past Medical History:   Diagnosis Date   • Actinic keratosis    • Allergic rhinitis    • Altered bowel function    • Anxiety state    • Aptyalism    • Attention deficit hyperactivity disorder    • Cough    • Degenerative joint disease involving multiple joints    • Depressive disorder    • Diarrhea    • Disorder of skin    • Fissure in skin    • Foot pain    • Generalized anxiety disorder    • Herpes labialis    • History of bone scan 10/01/2008    DXA BONE DENSITY AXIAL 04507 (2) - Osteopenia of the lumbar spine, which has improved compared to the prior study. Normal bone mineral density of the hips, which has improved compared to the prior study   • History of colonoscopy 2011    Colon endoscopy 62085 (2) - Diverticulosis found in sigmoid colon. Moderate fixation of sigmoid colon. Internal hemorrhoids found in anus.    • History of esophagogastroduodenoscopy 2011    EGD w/ tube 07401 (1) - Normal hypopharynx, GE junction, duodenum, symmetrical & patent pylorus. Normal esophagus. Dilatation performed. Chronic gastritis found in antrum. Biopsy taken.   • History of mammogram 10/01/2008    Mammogram, both breasts (1) - Negative mammogram. Recommend follow-up in 12 months;     • Incontinence of feces    • Increased frequency of urination    • Insomnia    • Irritable " bowel syndrome    • Knee pain    • Lipoma of shoulder    • Low back pain    • Lumbosacral radiculopathy    • Nausea    • Nervous system abnormality     Disorder of the peripheral nervous system     • Osteopenia    • Pain in limb    • Pain in lower limb    • Plantar fasciitis    • Pure hypercholesterolemia    • Restless legs    • Skin lesion     nose   • Spasm    • Spasm of back muscles    • Squamous cell carcinoma of skin    • Stress fracture    • Tear film insufficiency    • Trochanteric bursitis of right hip    • Upper respiratory infection    • Urinary tract infectious disease    • Vertigo         Past Surgical History:   Procedure Laterality Date   • BREAST BIOPSY  12/04/1995    BIOPSY OF BREAST OPEN 35231 (2) - Right,Exscision of mass.Fibroadenoma. Fibrocystic disease, proliferative   • CATARACT EXTRACTION WITH INTRAOCULAR LENS IMPLANT Right 03/07/2006    Remove cataract, insert lens (1) - right   • DILATATION AND CURETTAGE  10/02/1986    D&C (1) - Fractional D&C laparoscopic tubal sterilization. Plus uterine fibroids, approximately 10-12 weeks in size   • FOOT SURGERY  03/20/1990    Foot/toes surgery procedure (1) - Left,Excisional biopsy. Tumor, supposed fibroma, 5th toe   • INJECTION OF MEDICATION  06/08/2015    Kenalog (2) - SERENA Robert   • LIVER BIOPSY  04/26/2000    Needle biopsy of liver (1) - percutaneous liver biopsy;     • OTHER SURGICAL HISTORY  10/17/2002    Chest procedure (2) - Intralesional injection of keloid of chest    • OTHER SURGICAL HISTORY  10/25/2001    Remove axillary lymph nodes (1) - Biopsy, Left axillary adenopathy. 4 lymph nodes with reactive hyperplasia No tumor seen   • OTHER SURGICAL HISTORY  06/17/2013    Biopsy, Each Additional Lesion 58242 (1) - SERENA Robert   • OTHER SURGICAL HISTORY  07/14/2014    Destruction of Premalignant Lesion (1st) 33057 (2)  - SERENA Robert   • OTHER SURGICAL HISTORY  05/10/2016    Drain/Inject Major Joint 62079 (4) - SERENA Robert   • SKIN BIOPSY  07/28/2014    Biopsy of  Skin 99139 (2)  - SERENA Robert   • TOTAL ABDOMINAL HYSTERECTOMY WITH SALPINGO OOPHORECTOMY  09/25/1990    incidental appendectomy.Uterine fibroids             PT Ortho       11/02/17 1000    Subjective Pain    Post-Treatment Pain Level 0  -TM    Posture/Observations    Observations --   R LE short in supine  -TM      User Key  (r) = Recorded By, (t) = Taken By, (c) = Cosigned By    Initials Name Provider Type     Debra Luque PTA Physical Therapy Assistant                            PT Assessment/Plan       11/02/17 1000       PT Assessment    Functional Limitations Decreased safety during functional activities;Impaired gait;Limitations in community activities;Limitations in functional capacity and performance;Performance in leisure activities  -TM     Impairments Endurance;Muscle strength;Pain;Poor body mechanics;Posture;Range of motion  -TM     Assessment Comments Pt contts to present with LLD.  Placed heellift in R shoe.  Alignment good in standing with lift.  -TM     Rehab Potential Good  -TM     Patient/caregiver participated in establishment of treatment plan and goals Yes  -TM     Patient would benefit from skilled therapy intervention Yes  -TM     PT Plan    PT Frequency 2x/week  -TM     Predicted Duration of Therapy Intervention (days/wks) 3 weeks  -TM     PT Plan Comments Progress standing therex.  -TM       User Key  (r) = Recorded By, (t) = Taken By, (c) = Cosigned By    Initials Name Provider Type     Debra Luque PTA Physical Therapy Assistant                    Exercises       11/02/17 1000          Subjective Comments    Subjective Comments Pt reports reaching over couch & later having pain & tenderness in L rib cage.    -TM      Subjective Pain    Able to rate subjective pain? yes  -TM      Pre-Treatment Pain Level 0  -TM      Post-Treatment Pain Level 0  -TM      Exercise 1    Exercise Name 1 PRO II for ROM/endurance  -TM      Time (Minutes) 1 8  -TM      Additional Comments level 4   -TM      Exercise 2    Exercise Name 2 seated trunk rotation with wand  -TM      Sets 2 2  -TM      Reps 2 10  -TM      Exercise 3    Exercise Name 3 SKTC stretch  -TM      Sets 3 2  -TM      Time (Seconds) 3 30  -TM      Exercise 4    Exercise Name 4 bridge on ball  -TM      Sets 4 2  -TM      Reps 4 10  -TM      Exercise 5    Exercise Name 5 DKTC ball roll  -TM      Sets 5 2  -TM      Reps 5 10  -TM      Exercise 6    Exercise Name 6 prone TKE/GS  -TM      Sets 6 2  -TM      Reps 6 10  -TM      Exercise 7    Exercise Name 7 prone hip IR  -TM      Sets 7 2  -TM      Reps 7 10  -TM      Exercise 8    Exercise Name 8 seated gavin disc LAQ  -TM      Sets 8 2  -TM      Reps 8 10  -TM      Exercise 9    Exercise Name 9 seated gavin disc march  -TM      Sets 9 2  -TM      Reps 9 10  -TM      Exercise 10    Exercise Name 10 tandem stance L/R lead  -TM      Reps 10 4  -TM      Time (Seconds) 10 30  -TM        User Key  (r) = Recorded By, (t) = Taken By, (c) = Cosigned By    Initials Name Provider Type     Debra Luque, PTA Physical Therapy Assistant                               PT OP Goals       11/02/17 1000       PT Short Term Goals    STG Date to Achieve 10/24/17  -TM     STG 1 IND and compliant with HEP  -TM     STG 1 Progress Progressing  -TM     STG 2 Patient will demonstrate 25 seconds tandem balance each foot forward  -TM     STG 2 Progress New  -TM     STG 3 Patient will demonstrate 4-/5 B hip ABD strength  -TM     STG 3 Progress Progressing  -TM     STG 4 Paitent will demonstrate 4-/5 B hip flexion strength  -TM     STG 4 Progress Met  -TM     STG 5 Patient will be able to tolerate 45 min treatment session without increase in R hip pain  -TM     STG 5 Progress Met  -TM     Long Term Goals    LTG Date to Achieve 11/07/17  -TM     LTG 1 OSWESTRY will be less than 20%  -TM     LTG 1 Progress Met  -TM     LTG 2 Patient will demonstrate 4/5 B hip ABD strength  -TM     LTG 2 Progress Ongoing  -TM     LTG 3  Patient will demonstrate 4/5 B hip flexion strength measured in SLR position  -     LTG 3 Progress Ongoing  -     LTG 4 Patient will be able to perform standing exercises with TA iso with minimal cuing.  -     LTG 4 Progress Ongoing  -     LTG 5 Patient will report 40% improvement in symptoms since starting therapy.  -     LTG 5 Progress Ongoing  -     LTG 6 Patient will demonstrate 30 sec tandem stance each foot leading.  -     LTG 6 Progress Met  -       User Key  (r) = Recorded By, (t) = Taken By, (c) = Cosigned By    Initials Name Provider Type     Debra Luque PTA Physical Therapy Assistant                    Time Calculation:   Start Time: 1015  Stop Time: 1105  Time Calculation (min): 50 min  Total Timed Code Minutes- PT: 50 minute(s)    Therapy Charges for Today     Code Description Service Date Service Provider Modifiers Qty    20635168198 HC PT THER PROC EA 15 MIN 11/2/2017 Debra Luque PTA GP 3                    Debra Luque PTA  11/2/2017

## 2017-11-07 ENCOUNTER — HOSPITAL ENCOUNTER (OUTPATIENT)
Dept: PHYSICAL THERAPY | Facility: HOSPITAL | Age: 77
Setting detail: THERAPIES SERIES
Discharge: HOME | End: 2017-11-07

## 2017-11-07 DIAGNOSIS — M51.26 OTHER INTERVERTEBRAL DISC DISPLACEMENT, LUMBAR REGION: Primary | ICD-10-CM

## 2017-11-07 PROCEDURE — 97110 THERAPEUTIC EXERCISES: CPT

## 2017-11-07 NOTE — PROGRESS NOTES
Outpatient Physical Therapy Ortho Treatment Note   [facility]     Patient Name: Sara Solis  : 1940  MRN: [id number]  Today's Date: 2017      Visit Date: 2017    Subjective Improvement:     10%  Attendance:   Approved:  12 Visits (10-04 thru )        MD follow up:   prn        RC date:   17      Visit Dx:    ICD-10-CM ICD-9-CM   1. Other intervertebral disc displacement, lumbar region M51.26 722.10       Patient Active Problem List   Diagnosis   • Low back pain   • Actinic keratosis   • Shoulder pain, left   • Pure hypercholesterolemia   • Nausea   • History of fatigue   • Insomnia        Past Medical History:   Diagnosis Date   • Actinic keratosis    • Allergic rhinitis    • Altered bowel function    • Anxiety state    • Aptyalism    • Attention deficit hyperactivity disorder    • Cough    • Degenerative joint disease involving multiple joints    • Depressive disorder    • Diarrhea    • Disorder of skin    • Fissure in skin    • Foot pain    • Generalized anxiety disorder    • Herpes labialis    • History of bone scan 10/01/2008    DXA BONE DENSITY AXIAL 44557 (2) - Osteopenia of the lumbar spine, which has improved compared to the prior study. Normal bone mineral density of the hips, which has improved compared to the prior study   • History of colonoscopy 2011    Colon endoscopy 91205 (2) - Diverticulosis found in sigmoid colon. Moderate fixation of sigmoid colon. Internal hemorrhoids found in anus.    • History of esophagogastroduodenoscopy 2011    EGD w/ tube 08121 (1) - Normal hypopharynx, GE junction, duodenum, symmetrical & patent pylorus. Normal esophagus. Dilatation performed. Chronic gastritis found in antrum. Biopsy taken.   • History of mammogram 10/01/2008    Mammogram, both breasts (1) - Negative mammogram. Recommend follow-up in 12 months;     • Incontinence of feces    • Increased frequency of urination    • Insomnia    •  Irritable bowel syndrome    • Knee pain    • Lipoma of shoulder    • Low back pain    • Lumbosacral radiculopathy    • Nausea    • Nervous system abnormality     Disorder of the peripheral nervous system     • Osteopenia    • Pain in limb    • Pain in lower limb    • Plantar fasciitis    • Pure hypercholesterolemia    • Restless legs    • Skin lesion     nose   • Spasm    • Spasm of back muscles    • Squamous cell carcinoma of skin    • Stress fracture    • Tear film insufficiency    • Trochanteric bursitis of right hip    • Upper respiratory infection    • Urinary tract infectious disease    • Vertigo         Past Surgical History:   Procedure Laterality Date   • BREAST BIOPSY  12/04/1995    BIOPSY OF BREAST OPEN 76436 (2) - Right,Exscision of mass.Fibroadenoma. Fibrocystic disease, proliferative   • CATARACT EXTRACTION WITH INTRAOCULAR LENS IMPLANT Right 03/07/2006    Remove cataract, insert lens (1) - right   • DILATATION AND CURETTAGE  10/02/1986    D&C (1) - Fractional D&C laparoscopic tubal sterilization. Plus uterine fibroids, approximately 10-12 weeks in size   • FOOT SURGERY  03/20/1990    Foot/toes surgery procedure (1) - Left,Excisional biopsy. Tumor, supposed fibroma, 5th toe   • INJECTION OF MEDICATION  06/08/2015    Kenalog (2) - SERENA Robert   • LIVER BIOPSY  04/26/2000    Needle biopsy of liver (1) - percutaneous liver biopsy;     • OTHER SURGICAL HISTORY  10/17/2002    Chest procedure (2) - Intralesional injection of keloid of chest    • OTHER SURGICAL HISTORY  10/25/2001    Remove axillary lymph nodes (1) - Biopsy, Left axillary adenopathy. 4 lymph nodes with reactive hyperplasia No tumor seen   • OTHER SURGICAL HISTORY  06/17/2013    Biopsy, Each Additional Lesion 84917 (1) - SERENA Robert   • OTHER SURGICAL HISTORY  07/14/2014    Destruction of Premalignant Lesion (1st) 88315 (2)  - SERENA Robert   • OTHER SURGICAL HISTORY  05/10/2016    Drain/Inject Major Joint 71068 (4) - SERENA Robert   • SKIN BIOPSY  07/28/2014     Biopsy of Skin 82128 (2)  - SERENA Robert   • TOTAL ABDOMINAL HYSTERECTOMY WITH SALPINGO OOPHORECTOMY  09/25/1990    incidental appendectomy.Uterine fibroids             PT Ortho       11/07/17 0900    Subjective Comments    Subjective Comments Went to [place] during the weekend & back pain increased with the ride. Not sure of benefit from heel lift yet, since she didn't use it much past few days.  Wearing different shoes.  -TM    Subjective Pain    Able to rate subjective pain? yes  -TM    Pre-Treatment Pain Level 5  -TM      User Key  (r) = Recorded By, (t) = Taken By, (c) = Cosigned By    Initials Name Provider Type    TM Debra Luque PTA Physical Therapy Assistant                            PT Assessment/Plan       11/07/17 0900       PT Assessment    Functional Limitations Decreased safety during functional activities;Impaired gait;Limitations in community activities;Limitations in functional capacity and performance;Performance in leisure activities  -TM     Impairments Endurance;Muscle strength;Pain;Poor body mechanics;Posture;Range of motion  -TM     Assessment Comments Pt showing improvement in posture & core stability.  Pain decreased post RX.  -TM     Rehab Potential Good  -TM     Patient/caregiver participated in establishment of treatment plan and goals Yes  -TM     Patient would benefit from skilled therapy intervention Yes  -TM     PT Plan    PT Frequency 2x/week  -TM     Predicted Duration of Therapy Intervention (days/wks) 3 weeks  -TM     PT Plan Comments Physioball seated core  -TM       User Key  (r) = Recorded By, (t) = Taken By, (c) = Cosigned By    Initials Name Provider Type    TM Debra Luque PTA Physical Therapy Assistant                    Exercises       11/07/17 0900          Subjective Comments    Subjective Comments Went to [place] during the weekend & back pain increased with the ride. Not sure of benefit from heel lift yet, since she didn't use it much past few days.  Wearing  different shoes.  -TM      Subjective Pain    Able to rate subjective pain? yes  -TM      Pre-Treatment Pain Level 5  -TM      Post-Treatment Pain Level 3  -TM      Exercise 1    Exercise Name 1 PRO II for ROM/endurance  -TM      Time (Minutes) 1 8  -TM      Additional Comments level 4  -TM      Exercise 2    Exercise Name 2 seated trunk rotation with wand  -TM      Sets 2 2  -TM      Reps 2 10  -TM      Exercise 3    Exercise Name 3 SKTC stretch  -TM      Sets 3 2  -TM      Time (Seconds) 3 30  -TM      Exercise 4    Exercise Name 4 bridge on ball  -TM      Sets 4 2  -TM      Reps 4 10  -TM      Exercise 5    Exercise Name 5 DKTC ball roll  -TM      Sets 5 2  -TM      Reps 5 10  -TM      Exercise 6    Exercise Name 6 seated gavin disc LAQ  -TM      Sets 6 2  -TM      Reps 6 10  -TM      Exercise 7    Exercise Name 7 seated gavin disc march  -TM      Sets 7 2  -TM      Reps 7 10  -TM      Exercise 8    Exercise Name 8 standing hip Abd, ext  -TM      Sets 8 2  -TM      Reps 8 10  -TM      Exercise 9    Exercise Name 9 mini squats  -TM      Sets 9 2  -TM      Reps 9 10  -TM      Exercise 10    Exercise Name 10 standing march  -TM      Sets 10 2  -TM      Reps 10 10  -TM      Exercise 11    Exercise Name 11 T Band rows with trans abs  -TM      Sets 11 2  -TM      Reps 11 10  -TM      Additional Comments red  -TM      Exercise 12    Exercise Name 12 T Band ext with trans abs  -TM      Sets 12 2  -TM      Reps 12 10  -TM      Additional Comments red  -TM        User Key  (r) = Recorded By, (t) = Taken By, (c) = Cosigned By    Initials Name Provider Type    TM Debra Luque PTA Physical Therapy Assistant                               PT OP Goals       11/07/17 0900       PT Short Term Goals    STG Date to Achieve 10/24/17  -TM     STG 1 IND and compliant with HEP  -TM     STG 1 Progress Progressing  -TM     STG 2 Patient will demonstrate 25 seconds tandem balance each foot forward  -TM     STG 2 Progress Met  -TM      STG 3 Patient will demonstrate 4-/5 B hip ABD strength  -     STG 3 Progress Progressing  -TM     STG 4 Paitent will demonstrate 4-/5 B hip flexion strength  -     STG 4 Progress Met  -TM     STG 5 Patient will be able to tolerate 45 min treatment session without increase in R hip pain  -     STG 5 Progress Met  -     Long Term Goals    LTG Date to Achieve 11/07/17  -     LTG 1 OSWESTRY will be less than 20%  -     LTG 1 Progress Met  -     LTG 2 Patient will demonstrate 4/5 B hip ABD strength  -     LTG 2 Progress Ongoing  -TM     LTG 3 Patient will demonstrate 4/5 B hip flexion strength measured in SLR position  -     LTG 3 Progress Ongoing  -     LTG 4 Patient will be able to perform standing exercises with TA iso with minimal cuing.  -     LTG 4 Progress Met  -     LTG 5 Patient will report 40% improvement in symptoms since starting therapy.  -     LTG 5 Progress Ongoing  -     LTG 6 Patient will demonstrate 30 sec tandem stance each foot leading.  -     LTG 6 Progress Met  -       User Key  (r) = Recorded By, (t) = Taken By, (c) = Cosigned By    Initials Name Provider Type     Debra Luque PTA Physical Therapy Assistant                    Time Calculation:   Start Time: 0930  Stop Time: 1014  Time Calculation (min): 44 min  Total Timed Code Minutes- PT: 44 minute(s)    Therapy Charges for Today     Code Description Service Date Service Provider Modifiers Qty    21425403340 HC PT THER PROC EA 15 MIN 11/7/2017 Debra Luque PTA GP 3                    Debra Luque PTA  11/7/2017

## 2017-11-09 ENCOUNTER — APPOINTMENT (OUTPATIENT)
Dept: PHYSICAL THERAPY | Facility: HOSPITAL | Age: 77
End: 2017-11-09

## 2017-11-14 ENCOUNTER — APPOINTMENT (OUTPATIENT)
Dept: PHYSICAL THERAPY | Facility: HOSPITAL | Age: 77
End: 2017-11-14

## 2017-11-16 ENCOUNTER — HOSPITAL ENCOUNTER (OUTPATIENT)
Dept: PHYSICAL THERAPY | Facility: HOSPITAL | Age: 77
Setting detail: THERAPIES SERIES
Discharge: HOME | End: 2017-11-16

## 2017-11-16 DIAGNOSIS — M51.26 OTHER INTERVERTEBRAL DISC DISPLACEMENT, LUMBAR REGION: Primary | ICD-10-CM

## 2017-11-16 DIAGNOSIS — M51.16 INTERVERTEBRAL DISC DISORDERS WITH RADICULOPATHY, LUMBAR REGION: ICD-10-CM

## 2017-11-16 PROCEDURE — G8980 MOBILITY D/C STATUS: HCPCS | Performed by: PHYSICAL THERAPIST

## 2017-11-16 PROCEDURE — G8979 MOBILITY GOAL STATUS: HCPCS | Performed by: PHYSICAL THERAPIST

## 2017-11-16 PROCEDURE — 97110 THERAPEUTIC EXERCISES: CPT | Performed by: PHYSICAL THERAPIST

## 2017-11-16 NOTE — PROGRESS NOTES
Outpatient Physical Therapy Ortho Discharge Summary  [facility]     Patient Name: Sara Solis  : 1940  MRN: [id number]  Today's Date: 2017      Visit Date: 2017      Attendance 10/13   Authorized 12     to exp 17   Pre Rx pain 4   Post Rx pain 4   % improvement 50%   MD follow up PRN   Recert date none           Visit Dx:    ICD-10-CM ICD-9-CM   1. Other intervertebral disc displacement, lumbar region M51.26 722.10   2. Intervertebral disc disorders with radiculopathy, lumbar region M51.16 724.4       Patient Active Problem List   Diagnosis   • Low back pain   • Actinic keratosis   • Shoulder pain, left   • Pure hypercholesterolemia   • Nausea   • History of fatigue   • Insomnia        Past Medical History:   Diagnosis Date   • Actinic keratosis    • Allergic rhinitis    • Altered bowel function    • Anxiety state    • Aptyalism    • Attention deficit hyperactivity disorder    • Cough    • Degenerative joint disease involving multiple joints    • Depressive disorder    • Diarrhea    • Disorder of skin    • Fissure in skin    • Foot pain    • Generalized anxiety disorder    • Herpes labialis    • History of bone scan 10/01/2008    DXA BONE DENSITY AXIAL 91760 (2) - Osteopenia of the lumbar spine, which has improved compared to the prior study. Normal bone mineral density of the hips, which has improved compared to the prior study   • History of colonoscopy 2011    Colon endoscopy 09345 (2) - Diverticulosis found in sigmoid colon. Moderate fixation of sigmoid colon. Internal hemorrhoids found in anus.    • History of esophagogastroduodenoscopy 2011    EGD w/ tube 54254 (1) - Normal hypopharynx, GE junction, duodenum, symmetrical & patent pylorus. Normal esophagus. Dilatation performed. Chronic gastritis found in antrum. Biopsy taken.   • History of mammogram 10/01/2008    Mammogram, both breasts (1) - Negative mammogram. Recommend follow-up in 12 months;     •  Incontinence of feces    • Increased frequency of urination    • Insomnia    • Irritable bowel syndrome    • Knee pain    • Lipoma of shoulder    • Low back pain    • Lumbosacral radiculopathy    • Nausea    • Nervous system abnormality     Disorder of the peripheral nervous system     • Osteopenia    • Pain in limb    • Pain in lower limb    • Plantar fasciitis    • Pure hypercholesterolemia    • Restless legs    • Skin lesion     nose   • Spasm    • Spasm of back muscles    • Squamous cell carcinoma of skin    • Stress fracture    • Tear film insufficiency    • Trochanteric bursitis of right hip    • Upper respiratory infection    • Urinary tract infectious disease    • Vertigo         Past Surgical History:   Procedure Laterality Date   • BREAST BIOPSY  12/04/1995    BIOPSY OF BREAST OPEN 64638 (2) - Right,Exscision of mass.Fibroadenoma. Fibrocystic disease, proliferative   • CATARACT EXTRACTION WITH INTRAOCULAR LENS IMPLANT Right 03/07/2006    Remove cataract, insert lens (1) - right   • DILATATION AND CURETTAGE  10/02/1986    D&C (1) - Fractional D&C laparoscopic tubal sterilization. Plus uterine fibroids, approximately 10-12 weeks in size   • FOOT SURGERY  03/20/1990    Foot/toes surgery procedure (1) - Left,Excisional biopsy. Tumor, supposed fibroma, 5th toe   • INJECTION OF MEDICATION  06/08/2015    Kenalog (2) - SERENA Robert   • LIVER BIOPSY  04/26/2000    Needle biopsy of liver (1) - percutaneous liver biopsy;     • OTHER SURGICAL HISTORY  10/17/2002    Chest procedure (2) - Intralesional injection of keloid of chest    • OTHER SURGICAL HISTORY  10/25/2001    Remove axillary lymph nodes (1) - Biopsy, Left axillary adenopathy. 4 lymph nodes with reactive hyperplasia No tumor seen   • OTHER SURGICAL HISTORY  06/17/2013    Biopsy, Each Additional Lesion 09237 (1) - SERENA Robert   • OTHER SURGICAL HISTORY  07/14/2014    Destruction of Premalignant Lesion (1st) 89178 (2)  - SERENA Robert   • OTHER SURGICAL HISTORY  05/10/2016     Drain/Inject Major Joint 73774 (4) - SERENA Robert   • SKIN BIOPSY  07/28/2014    Biopsy of Skin 58879 (2)  - SERNEA Robert   • TOTAL ABDOMINAL HYSTERECTOMY WITH SALPINGO OOPHORECTOMY  09/25/1990    incidental appendectomy.Uterine fibroids             PT Ortho       11/16/17 1428    Posture/Observations    Posture/Observations Comments NAG, no guarding  -DD    Lumbar ROM Screen- Lower Quarter Clearing    Lumbar Flexion Normal  -DD    Lumbar Extension Normal  -DD    Lumbar Lateral Flexion Normal  -DD    Lumbar Rotation Normal  -DD    Left Hip    Hip Flexion Gross Movement (4+/5) good plus  -DD    Hip Extension Gross Movement (4+/5) good plus  -DD    Hip ABduction Gross Movement (4+/5) good plus  -DD    Right Hip    Hip Flexion Gross Movement (4+/5) good plus  -DD    Hip Extension Gross Movement (4+/5) good plus  -DD    Hip ABduction Gross Movement (4+/5) good plus  -DD    Right Knee    Knee Extension Gross Movement (5/5) normal  -DD    Knee Flexion Gross Movement (5/5) normal  -DD      User Key  (r) = Recorded By, (t) = Taken By, (c) = Cosigned By    Initials Name Provider Type    DD Zully Purcell, PT Physical Therapist                  Exercises       11/16/17 1428          Subjective Comments    Subjective Comments Thinks she is beetter no pain pills since 11/3/17.  -DD      Subjective Pain    Able to rate subjective pain? yes  -DD      Pre-Treatment Pain Level 4  -DD      Exercise 1    Exercise Name 1 PRO II for ROM/endurance  -DD      Time (Minutes) 1 8  -DD      Additional Comments L4  -DD      Exercise 2    Exercise Name 2 clamshells  -DD      Sets 2 2  -DD      Reps 2 10  -DD      Exercise 3    Exercise Name 3 SKTC stretch  -DD      Sets 3 2  -DD      Time (Seconds) 3 30  -DD      Exercise 4    Exercise Name 4 bridge on ball  -DD      Sets 4 2  -DD      Reps 4 10  -DD      Exercise 5    Exercise Name 5 DKTC ball roll  -DD      Sets 5 2  -DD      Reps 5 10  -DD      Exercise 6    Exercise Name 6 seated gavin disc  LAQ  -DD      Sets 6 2  -DD      Reps 6 10  -DD      Exercise 7    Exercise Name 7 seated gavin disc march  -DD      Sets 7 2  -DD      Reps 7 10  -DD      Exercise 8    Exercise Name 8 standing hip Abd, ext  -DD      Sets 8 2  -DD      Reps 8 10  -DD      Exercise 9    Exercise Name 9 mini squats  -DD      Sets 9 2  -DD      Reps 9 10  -DD      Exercise 10    Exercise Name 10 standing march  -DD      Sets 10 2  -DD      Reps 10 10  -DD      Exercise 11    Exercise Name 11 isometric leg resistance  -DD      Sets 11 --  -DD      Reps 11 --  -DD      Time (Minutes) 11 3  -DD      Exercise 12    Exercise Name 12 Trunk ROM  -DD      Sets 12 --  -DD      Reps 12 --  -DD      Time (Minutes) 12 2  -DD        User Key  (r) = Recorded By, (t) = Taken By, (c) = Cosigned By    Initials Name Provider Type    DD Zully Purcell, PT Physical Therapist                               PT OP Goals       11/16/17 1430       PT Short Term Goals    STG Date to Achieve 10/24/17  -DD     STG 1 IND and compliant with HEP  -DD     STG 1 Progress Met  -DD     STG 2 Patient will demonstrate 25 seconds tandem balance each foot forward  -DD     STG 2 Progress Met  -DD     STG 3 Patient will demonstrate 4-/5 B hip ABD strength  -DD     STG 3 Progress Met  -DD     STG 4 Paitent will demonstrate 4-/5 B hip flexion strength  -DD     STG 4 Progress Met  -DD     STG 5 Patient will be able to tolerate 45 min treatment session without increase in R hip pain  -DD     STG 5 Progress Met  -DD     Long Term Goals    LTG Date to Achieve 11/07/17  -DD     LTG 1 OSWESTRY will be less than 20%  -DD     LTG 1 Progress Met  -DD     LTG 2 Patient will demonstrate 4/5 B hip ABD strength  -DD     LTG 2 Progress Met  -DD     LTG 3 Patient will demonstrate 4/5 B hip flexion strength measured in SLR position  -DD     LTG 3 Progress Met  -DD     LTG 4 Patient will be able to perform standing exercises with TA iso with minimal cuing.  -DD     LTG 4 Progress Met  -DD      LTG 5 Patient will report 40% improvement in symptoms since starting therapy.  -DD     LTG 5 Progress Met  -DD     LTG 5 Progress Comments 50%  -DD     LTG 6 Patient will demonstrate 30 sec tandem stance each foot leading.  -DD     LTG 6 Progress Met  -DD       User Key  (r) = Recorded By, (t) = Taken By, (c) = Cosigned By    Initials Name Provider Type    DD Zully Purcell, PT Physical Therapist                    Time Calculation:   Start Time: 1430  Stop Time: 1510  Time Calculation (min): 40 min  Total Timed Code Minutes- PT: 40 minute(s)    Therapy Charges for Today     Code Description Service Date Service Provider Modifiers Qty    72414769295 HC PT MOBILITY PROJECTED 11/16/2017 Zully Purcell, PT GP, CI 1    53907128058 HC PT MOBILITY DISCHARGE 11/16/2017 Zully Purcell, PT GP, CI 1    05968541932 HC PT THER PROC EA 15 MIN 11/16/2017 Zully Purcell, PT GP 3          PT G-Codes  Outcome Measure Options: Jennifer Escamilla  Score: 8%  Functional Limitation: Mobility: Walking and moving around  Mobility: Walking and Moving Around Goal Status (): At least 1 percent but less than 20 percent impaired, limited or restricted  Mobility: Walking and Moving Around Discharge Status (): At least 1 percent but less than 20 percent impaired, limited or restricted         Zully Purcell, PT, ATC, DPT  11/16/2017

## 2017-11-21 ENCOUNTER — OFFICE VISIT (OUTPATIENT)
Dept: PODIATRY | Facility: CLINIC | Age: 77
End: 2017-11-21

## 2017-11-21 VITALS
HEIGHT: 66 IN | BODY MASS INDEX: 23.78 KG/M2 | OXYGEN SATURATION: 94 % | DIASTOLIC BLOOD PRESSURE: 77 MMHG | WEIGHT: 148 LBS | SYSTOLIC BLOOD PRESSURE: 137 MMHG | HEART RATE: 69 BPM

## 2017-11-21 DIAGNOSIS — M79.672 LEFT FOOT PAIN: Primary | ICD-10-CM

## 2017-11-21 PROCEDURE — 99203 OFFICE O/P NEW LOW 30 MIN: CPT | Performed by: PODIATRIST

## 2017-11-21 RX ORDER — METHYLPREDNISOLONE 4 MG/1
TABLET ORAL
Qty: 21 TABLET | Refills: 0 | Status: SHIPPED | OUTPATIENT
Start: 2017-11-21 | End: 2017-12-06

## 2017-11-21 NOTE — PROGRESS NOTES
Sara Solis  1940  76 y.o. female     Patient presents today with a complaint of left foot pain and swelling and an issue with dry cracking skin.    11/21/2017  Chief Complaint   Patient presents with   • Left Foot - Pain, cracking skin on heel           History of Present Illness    Sara Solis is a 76 y.o.female who presents to clinic today with chief complaint of left foot pain.  She rates the pain as a 4 out of 10.  She describes this throbbing and constant.  She states that it started Halloween night.  She relates to walking in tennis shoes for an extended period time which is abnormal for her.  He is currently aggravated with prolonged weightbearing and relieved with rest.  She also states that her foot is swollen.  She has really done nothing to treat it.  She denies any known injuries or trauma.  She has no other pedal complaints.          Past Medical History:   Diagnosis Date   • Actinic keratosis    • Allergic rhinitis    • Altered bowel function    • Anxiety state    • Aptyalism    • Attention deficit hyperactivity disorder    • Bunion    • Cough    • Degenerative joint disease involving multiple joints    • Depressive disorder    • Diarrhea    • Disorder of skin    • Fissure in skin    • Foot pain    • Generalized anxiety disorder    • Hepatitis    • Herpes labialis    • History of bone scan 10/01/2008    DXA BONE DENSITY AXIAL 42047 (2) - Osteopenia of the lumbar spine, which has improved compared to the prior study. Normal bone mineral density of the hips, which has improved compared to the prior study   • History of colonoscopy 04/05/2011    Colon endoscopy 00248 (2) - Diverticulosis found in sigmoid colon. Moderate fixation of sigmoid colon. Internal hemorrhoids found in anus.    • History of esophagogastroduodenoscopy 04/05/2011    EGD w/ tube 02691 (1) - Normal hypopharynx, GE junction, duodenum, symmetrical & patent pylorus. Normal esophagus. Dilatation performed. Chronic  gastritis found in antrum. Biopsy taken.   • History of mammogram 10/01/2008    Mammogram, both breasts (1) - Negative mammogram. Recommend follow-up in 12 months;     • Incontinence of feces    • Increased frequency of urination    • Insomnia    • Irritable bowel syndrome    • Knee pain    • Lipoma of shoulder    • Low back pain    • Lumbosacral radiculopathy    • Nausea    • Nervous system abnormality     Disorder of the peripheral nervous system     • Osteopenia    • Pain in limb    • Pain in lower limb    • Plantar fasciitis    • Pure hypercholesterolemia    • Restless legs    • Skin lesion     nose   • Spasm    • Spasm of back muscles    • Squamous cell carcinoma of skin    • Stress fracture    • Tear film insufficiency    • Trochanteric bursitis of right hip    • Upper respiratory infection    • Urinary tract infectious disease    • Vertigo          Past Surgical History:   Procedure Laterality Date   • BREAST BIOPSY  12/04/1995    BIOPSY OF BREAST OPEN 52269 (2) - Right,Exscision of mass.Fibroadenoma. Fibrocystic disease, proliferative   • CATARACT EXTRACTION WITH INTRAOCULAR LENS IMPLANT Right 03/07/2006    Remove cataract, insert lens (1) - right   • DILATATION AND CURETTAGE  10/02/1986    D&C (1) - Fractional D&C laparoscopic tubal sterilization. Plus uterine fibroids, approximately 10-12 weeks in size   • FOOT SURGERY  03/20/1990    Foot/toes surgery procedure (1) - Left,Excisional biopsy. Tumor, supposed fibroma, 5th toe   • INJECTION OF MEDICATION  06/08/2015    Kenalog (2) - SERENA Robert   • LIVER BIOPSY  04/26/2000    Needle biopsy of liver (1) - percutaneous liver biopsy;     • OTHER SURGICAL HISTORY  10/17/2002    Chest procedure (2) - Intralesional injection of keloid of chest    • OTHER SURGICAL HISTORY  10/25/2001    Remove axillary lymph nodes (1) - Biopsy, Left axillary adenopathy. 4 lymph nodes with reactive hyperplasia No tumor seen   • OTHER SURGICAL HISTORY  06/17/2013    Biopsy, Each Additional  Lesion 13058 (1) - SERENA Robert   • OTHER SURGICAL HISTORY  07/14/2014    Destruction of Premalignant Lesion (1st) 48431 (2)  - SERENA Robert   • OTHER SURGICAL HISTORY  05/10/2016    Drain/Inject Major Joint 47206 (4) - SERENA Robert   • SKIN BIOPSY  07/28/2014    Biopsy of Skin 65942 (2)  - SERENA Robert   • TOTAL ABDOMINAL HYSTERECTOMY WITH SALPINGO OOPHORECTOMY  09/25/1990    incidental appendectomy.Uterine fibroids         Family History   Problem Relation Age of Onset   • Melanoma Other    • Stroke Other    • Osteoporosis Mother    • Cancer Father    • Psoriasis Sister    • Severe sprains Sister    • Diabetes Sister    • Heart disease Maternal Uncle    • Heart disease Maternal Grandmother        No Known Allergies    Social History     Social History   • Marital status:      Spouse name: N/A   • Number of children: N/A   • Years of education: N/A     Occupational History   • Not on file.     Social History Main Topics   • Smoking status: Never Smoker   • Smokeless tobacco: Never Used      Comment: Tobacco no use   • Alcohol use No   • Drug use: No   • Sexual activity: No     Other Topics Concern   • Not on file     Social History Narrative         Current Outpatient Prescriptions   Medication Sig Dispense Refill   • aspirin 81 MG chewable tablet Chew 81 mg Daily.     • Calcium Carb-Cholecalciferol (CALCIUM + D3 PO) Take  by mouth Daily.     • cholecalciferol (VITAMIN D3) 1000 UNITS tablet Take 1,000 Units by mouth Daily.     • FERROUS SULFATE PO Take  by mouth Daily.     • fexofenadine (ALLEGRA) 180 MG tablet Take 180 mg by mouth Every Night.     • gabapentin (NEURONTIN) 600 MG tablet 1/2 to one tablet tid prn. 270 tablet 1   • ibuprofen (ADVIL,MOTRIN) 600 MG tablet Take 600 mg by mouth Daily.     • Magnesium 100 MG capsule Take 400 mg by mouth Daily.     • meclizine (ANTIVERT) 25 MG tablet Take 1 tablet by mouth 3 (Three) Times a Day As Needed for dizziness. 90 tablet 11   • Multiple Vitamins-Minerals (CENTRUM SILVER PO)  "Take  by mouth Daily.     • omeprazole (PRILOSEC) 20 MG capsule Take 1 capsule by mouth 2 (Two) Times a Day. 180 capsule 3   • pramipexole (MIRAPEX) 0.5 MG tablet Take 2 tablets by mouth Every Night. 180 tablet 3   • promethazine (PHENERGAN) 25 MG tablet TAKE 1 TABLET BY MOUTH EVERY 6 (SIX) HOURS AS NEEDED FOR NAUSEA OR VOMITING * MAY CAUSE DROWSINESS. 30 tablet 0   • traMADol (ULTRAM) 50 MG tablet Take 1 tablet by mouth Every 8 (Eight) Hours As Needed for moderate pain (4-6). 90 tablet 2   • valACYclovir (VALTREX) 1000 MG tablet Take 2 tablets by mouth 2 (Two) Times a Day. 4 tablet 11   • MethylPREDNISolone (MEDROL, DELIA,) 4 MG tablet Take as directed on package instructions. 21 tablet 0     No current facility-administered medications for this visit.          OBJECTIVE    /77  Pulse 69  Ht 65.5\" (166.4 cm)  Wt 148 lb (67.1 kg)  SpO2 94%  BMI 24.25 kg/m2      Review of Systems   Constitutional: Negative.    Eyes: Negative.    Respiratory: Negative.    Cardiovascular: Negative.    Gastrointestinal: Negative.    Endocrine: Negative.    Genitourinary: Negative.    Musculoskeletal: Positive for back pain.        Joint pain   Skin: Negative.    Allergic/Immunologic: Negative.    Neurological: Positive for dizziness and headaches.   Hematological: Negative.    Psychiatric/Behavioral: Negative.          Constitutional: well developed, well nourished    HEENT: Normocephalic and atraumatic, normal hearing    Respiratory: Non labored respirations noted    Cardiovascular:    DP/PT pulses palpable    CFT brisk  to all digits  Skin temp is warm to warm from proximal tibia to distal digits  Pedal hair growth present.   No erythema  noted   Edema noted to left foot    Musculoskeletal:  Muscle strength is 5/5 for all muscle groups tested   ROM of the 1st MTP is full without pain or crepitus  ROM of the MTJ is full with pain    ROM of the STJ is full without pain or crepitus    ROM of the ankle joint is full without pain " or crepitus    Diffuse pain on palpation to the tarsometatarsal joint dorsal left foot    Dermatological:   Nails 1-5 are within normal limits for length and thickness    Skin is warm, dry and intact    Webspaces 1-4 bilateral are clean, dry and intact.   No subcutaneous nodules or masses noted    No open wounds noted     Neurological:   Protective sensation intact    Sensation intact to light touch    DTR intact    Psychiatric: A&O x 3 with normal mood and affect. NAD.     Radiographs: 3 views left foot were obtained today.  No acute osseous abnormalities are noted.        Procedures        ASSESSMENT AND PLAN    Sara was seen today for pain and cracking skin on heel.    Diagnoses and all orders for this visit:    Left foot pain  -     XR Foot 3+ View Left    Other orders  -     MethylPREDNISolone (MEDROL, DELIA,) 4 MG tablet; Take as directed on package instructions.      - Comprehensive foot and ankle exam performed.   - X-rays taken and reviewed  - Rx for Medrol Dosepak  - Dispensed short cam boot.  Weightbearing as tolerated in boot for the next week   - All questions were answered to the patients satisfaction.  - RTC 1 week            This document has been electronically signed by Vincenzo Holly DPM on November 21, 2017 12:44 PM     11/21/2017  12:44 PM

## 2017-12-06 ENCOUNTER — OFFICE VISIT (OUTPATIENT)
Dept: FAMILY MEDICINE CLINIC | Facility: CLINIC | Age: 77
End: 2017-12-06

## 2017-12-06 VITALS
BODY MASS INDEX: 23.66 KG/M2 | OXYGEN SATURATION: 96 % | DIASTOLIC BLOOD PRESSURE: 62 MMHG | WEIGHT: 147.2 LBS | SYSTOLIC BLOOD PRESSURE: 120 MMHG | HEART RATE: 82 BPM | TEMPERATURE: 98 F | HEIGHT: 66 IN

## 2017-12-06 DIAGNOSIS — R05.9 COUGH: ICD-10-CM

## 2017-12-06 DIAGNOSIS — R42 VERTIGO: ICD-10-CM

## 2017-12-06 DIAGNOSIS — J30.2 ACUTE SEASONAL ALLERGIC RHINITIS, UNSPECIFIED TRIGGER: Primary | ICD-10-CM

## 2017-12-06 PROCEDURE — 99214 OFFICE O/P EST MOD 30 MIN: CPT | Performed by: FAMILY MEDICINE

## 2017-12-06 RX ORDER — FLUTICASONE PROPIONATE 50 MCG
2 SPRAY, SUSPENSION (ML) NASAL DAILY
Qty: 1 BOTTLE | Refills: 11 | Status: SHIPPED | OUTPATIENT
Start: 2017-12-06 | End: 2018-10-10 | Stop reason: SDUPTHER

## 2017-12-06 RX ORDER — CETIRIZINE HYDROCHLORIDE 10 MG/1
10 TABLET ORAL NIGHTLY
Qty: 30 TABLET | Refills: 11 | Status: SHIPPED | OUTPATIENT
Start: 2017-12-06 | End: 2019-03-28

## 2017-12-06 RX ORDER — HYDROCODONE BITARTRATE AND ACETAMINOPHEN 7.5; 325 MG/1; MG/1
TABLET ORAL
COMMUNITY
Start: 2017-11-30 | End: 2017-12-23 | Stop reason: HOSPADM

## 2017-12-06 RX ORDER — PROMETHAZINE HYDROCHLORIDE AND CODEINE PHOSPHATE 6.25; 1 MG/5ML; MG/5ML
5 SYRUP ORAL EVERY 6 HOURS PRN
Qty: 240 ML | Refills: 1 | Status: SHIPPED | OUTPATIENT
Start: 2017-12-06 | End: 2017-12-23 | Stop reason: HOSPADM

## 2017-12-06 RX ORDER — DIAZEPAM 5 MG/1
2.5-5 TABLET ORAL EVERY 8 HOURS PRN
Qty: 30 TABLET | Refills: 0 | Status: SHIPPED | OUTPATIENT
Start: 2017-12-06 | End: 2018-07-23 | Stop reason: SDUPTHER

## 2017-12-06 NOTE — PROGRESS NOTES
Subjective   Sara Solis is a 77 y.o. female.     History of Present Illness     Cough and sinus drainage 2 weeks  Vertigo not responding to meclizine.  Has taken valium in past for vertigo  Recent low back surgery, discectomy?, and back is better but still pain.  It is a different pain now she says    Review of Systems   Constitutional: Negative for chills, fatigue and fever.   HENT: Positive for congestion and postnasal drip. Negative for ear discharge, ear pain, facial swelling, hearing loss, rhinorrhea, sinus pressure, sore throat, trouble swallowing and voice change.    Eyes: Negative for discharge, redness and visual disturbance.   Respiratory: Positive for cough. Negative for chest tightness, shortness of breath and wheezing.    Cardiovascular: Negative for chest pain and palpitations.   Gastrointestinal: Negative for abdominal pain, blood in stool, constipation, diarrhea, nausea and vomiting.   Endocrine: Negative for polydipsia and polyuria.   Genitourinary: Negative for dysuria, flank pain, hematuria and urgency.   Musculoskeletal: Positive for back pain. Negative for arthralgias, joint swelling and myalgias.   Skin: Negative for rash.   Neurological: Negative for dizziness, weakness, numbness and headaches.   Hematological: Negative for adenopathy.   Psychiatric/Behavioral: Negative for confusion and sleep disturbance. The patient is not nervous/anxious.        Objective   Physical Exam   Constitutional: She is oriented to person, place, and time. She appears well-developed and well-nourished.   HENT:   Head: Normocephalic and atraumatic.   Right Ear: External ear normal.   Left Ear: External ear normal.   Nose: Nose normal.   Mouth/Throat: Oropharynx is clear and moist.   Eyes: Conjunctivae and EOM are normal. Pupils are equal, round, and reactive to light.   Neck: Normal range of motion. Neck supple.   Cardiovascular: Normal rate, regular rhythm and normal heart sounds.  Exam reveals no gallop  and no friction rub.    No murmur heard.  Pulmonary/Chest: Effort normal and breath sounds normal.   Abdominal: Soft. Bowel sounds are normal. She exhibits no distension. There is no tenderness. There is no rebound and no guarding.   Musculoskeletal: Normal range of motion. She exhibits no edema or deformity.   Neurological: She is alert and oriented to person, place, and time. No cranial nerve deficit.   Skin: Skin is warm and dry. No rash noted. No erythema.   Psychiatric: She has a normal mood and affect. Her behavior is normal. Judgment and thought content normal.   Nursing note and vitals reviewed.      Assessment/Plan   Sara was seen today for sinus problem.    Diagnoses and all orders for this visit:    Acute seasonal allergic rhinitis, unspecified trigger    Cough    Vertigo    Other orders  -     fluticasone (FLONASE) 50 MCG/ACT nasal spray; 2 sprays into each nostril Daily.  -     cetirizine (zyrTEC) 10 MG tablet; Take 1 tablet by mouth Every Night.  -     diazePAM (VALIUM) 5 MG tablet; Take 0.5-1 tablets by mouth Every 8 (Eight) Hours As Needed (vertigo).  -     promethazine-codeine (PHENERGAN with CODEINE) 6.25-10 MG/5ML syrup; Take 5 mL by mouth Every 6 (Six) Hours As Needed for Cough.    warned of side effects of medicine.  Start 1/2 of 5mg valium (2.5mg).     Geronimo:  [id number] ok

## 2017-12-19 ENCOUNTER — HOSPITAL ENCOUNTER (INPATIENT)
Facility: HOSPITAL | Age: 77
LOS: 4 days | Discharge: HOME HEALTH | End: 2017-12-23
Attending: EMERGENCY MEDICINE | Admitting: INTERNAL MEDICINE

## 2017-12-19 ENCOUNTER — APPOINTMENT (OUTPATIENT)
Dept: GENERAL RADIOLOGY | Facility: HOSPITAL | Age: 77
End: 2017-12-19

## 2017-12-19 DIAGNOSIS — S72.011A CLOSED SUBCAPITAL FRACTURE OF RIGHT FEMUR, INITIAL ENCOUNTER (HCC): Primary | ICD-10-CM
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
## 2017-12-19 DIAGNOSIS — Z74.09 IMPAIRED PHYSICAL MOBILITY: ICD-10-CM

## 2017-12-19 LAB
ALBUMIN SERPL-MCNC: 4.3 G/DL (ref 3.4–4.8)
ALBUMIN/GLOB SERPL: 1.3 G/DL (ref 1.1–1.8)
ALP SERPL-CCNC: 71 U/L (ref 38–126)
ALT SERPL W P-5'-P-CCNC: 26 U/L (ref 9–52)
ANION GAP SERPL CALCULATED.3IONS-SCNC: 9 MMOL/L (ref 5–15)
AST SERPL-CCNC: 28 U/L (ref 14–36)
BACTERIA UR QL AUTO: ABNORMAL /HPF
BASOPHILS # BLD AUTO: 0.01 10*3/MM3 (ref 0–0.2)
BASOPHILS NFR BLD AUTO: 0.1 % (ref 0–2)
BILIRUB SERPL-MCNC: 0.5 MG/DL (ref 0.2–1.3)
BILIRUB UR QL STRIP: NEGATIVE
BUN BLD-MCNC: 16 MG/DL (ref 7–21)
BUN/CREAT SERPL: 24.6 (ref 7–25)
CALCIUM SPEC-SCNC: 9.1 MG/DL (ref 8.4–10.2)
CHLORIDE SERPL-SCNC: 99 MMOL/L (ref 95–110)
CK MB SERPL-CCNC: 0.76 NG/ML (ref 0–5)
CK SERPL-CCNC: 69 U/L (ref 30–135)
CLARITY UR: ABNORMAL
CO2 SERPL-SCNC: 29 MMOL/L (ref 22–31)
COLOR UR: YELLOW
CREAT BLD-MCNC: 0.65 MG/DL (ref 0.5–1)
D-DIMER, QUANTITATIVE (MAD,POW, STR): >4000 NG/ML (FEU) (ref 0–470)
DEPRECATED RDW RBC AUTO: 43.2 FL (ref 36.4–46.3)
EOSINOPHIL # BLD AUTO: 0.05 10*3/MM3 (ref 0–0.7)
EOSINOPHIL NFR BLD AUTO: 0.3 % (ref 0–7)
ERYTHROCYTE [DISTWIDTH] IN BLOOD BY AUTOMATED COUNT: 13.5 % (ref 11.5–14.5)
GFR SERPL CREATININE-BSD FRML MDRD: 88 ML/MIN/1.73 (ref 39–90)
GLOBULIN UR ELPH-MCNC: 3.4 GM/DL (ref 2.3–3.5)
GLUCOSE BLD-MCNC: 102 MG/DL (ref 60–100)
GLUCOSE UR STRIP-MCNC: NEGATIVE MG/DL
HCT VFR BLD AUTO: 39.1 % (ref 35–45)
HGB BLD-MCNC: 12.8 G/DL (ref 12–15.5)
HGB UR QL STRIP.AUTO: ABNORMAL
HOLD SPECIMEN: NORMAL
IMM GRANULOCYTES # BLD: 0.04 10*3/MM3 (ref 0–0.02)
IMM GRANULOCYTES NFR BLD: 0.3 % (ref 0–0.5)
KETONES UR QL STRIP: NEGATIVE
LEUKOCYTE ESTERASE UR QL STRIP.AUTO: ABNORMAL
LYMPHOCYTES # BLD AUTO: 1.07 10*3/MM3 (ref 0.6–4.2)
LYMPHOCYTES NFR BLD AUTO: 7.4 % (ref 10–50)
MCH RBC QN AUTO: 28.8 PG (ref 26.5–34)
MCHC RBC AUTO-ENTMCNC: 32.7 G/DL (ref 31.4–36)
MCV RBC AUTO: 87.9 FL (ref 80–98)
MONOCYTES # BLD AUTO: 0.56 10*3/MM3 (ref 0–0.9)
MONOCYTES NFR BLD AUTO: 3.9 % (ref 0–12)
NEUTROPHILS # BLD AUTO: 12.78 10*3/MM3 (ref 2–8.6)
NEUTROPHILS NFR BLD AUTO: 88 % (ref 37–80)
NITRITE UR QL STRIP: POSITIVE
PH UR STRIP.AUTO: 6 [PH] (ref 5–9)
PLATELET # BLD AUTO: 223 10*3/MM3 (ref 150–450)
PMV BLD AUTO: 10.5 FL (ref 8–12)
POTASSIUM BLD-SCNC: 4.1 MMOL/L (ref 3.5–5.1)
PROT SERPL-MCNC: 7.7 G/DL (ref 6.3–8.6)
PROT UR QL STRIP: NEGATIVE
RBC # BLD AUTO: 4.45 10*6/MM3 (ref 3.77–5.16)
RBC # UR: ABNORMAL /HPF
REF LAB TEST METHOD: ABNORMAL
SODIUM BLD-SCNC: 137 MMOL/L (ref 137–145)
SP GR UR STRIP: 1.02 (ref 1–1.03)
SQUAMOUS #/AREA URNS HPF: ABNORMAL /HPF
TROPONIN I SERPL-MCNC: 0.04 NG/ML
TROPONIN I SERPL-MCNC: <0.012 NG/ML
UROBILINOGEN UR QL STRIP: ABNORMAL
WBC NRBC COR # BLD: 14.51 10*3/MM3 (ref 3.2–9.8)
WBC UR QL AUTO: ABNORMAL /HPF

## 2017-12-19 PROCEDURE — 25010000002 ONDANSETRON PER 1 MG: Performed by: INTERNAL MEDICINE

## 2017-12-19 PROCEDURE — 93010 ELECTROCARDIOGRAM REPORT: CPT | Performed by: INTERNAL MEDICINE

## 2017-12-19 PROCEDURE — 87086 URINE CULTURE/COLONY COUNT: CPT | Performed by: EMERGENCY MEDICINE

## 2017-12-19 PROCEDURE — 82550 ASSAY OF CK (CPK): CPT | Performed by: INTERNAL MEDICINE

## 2017-12-19 PROCEDURE — 84484 ASSAY OF TROPONIN QUANT: CPT | Performed by: INTERNAL MEDICINE

## 2017-12-19 PROCEDURE — 71010 HC CHEST PA OR AP: CPT

## 2017-12-19 PROCEDURE — 25010000002 CEFTRIAXONE PER 250 MG: Performed by: INTERNAL MEDICINE

## 2017-12-19 PROCEDURE — 93005 ELECTROCARDIOGRAM TRACING: CPT | Performed by: EMERGENCY MEDICINE

## 2017-12-19 PROCEDURE — 85379 FIBRIN DEGRADATION QUANT: CPT | Performed by: INTERNAL MEDICINE

## 2017-12-19 PROCEDURE — 99284 EMERGENCY DEPT VISIT MOD MDM: CPT

## 2017-12-19 PROCEDURE — 87040 BLOOD CULTURE FOR BACTERIA: CPT | Performed by: INTERNAL MEDICINE

## 2017-12-19 PROCEDURE — 72170 X-RAY EXAM OF PELVIS: CPT

## 2017-12-19 PROCEDURE — 81001 URINALYSIS AUTO W/SCOPE: CPT | Performed by: EMERGENCY MEDICINE

## 2017-12-19 PROCEDURE — 85025 COMPLETE CBC W/AUTO DIFF WBC: CPT | Performed by: EMERGENCY MEDICINE

## 2017-12-19 PROCEDURE — 25010000002 MORPHINE PER 10 MG: Performed by: EMERGENCY MEDICINE

## 2017-12-19 PROCEDURE — 25010000002 MORPHINE PER 10 MG: Performed by: INTERNAL MEDICINE

## 2017-12-19 PROCEDURE — 80053 COMPREHEN METABOLIC PANEL: CPT | Performed by: EMERGENCY MEDICINE

## 2017-12-19 PROCEDURE — 25010000002 ONDANSETRON PER 1 MG: Performed by: EMERGENCY MEDICINE

## 2017-12-19 PROCEDURE — 73552 X-RAY EXAM OF FEMUR 2/>: CPT

## 2017-12-19 PROCEDURE — 82553 CREATINE MB FRACTION: CPT | Performed by: INTERNAL MEDICINE

## 2017-12-19 RX ORDER — SODIUM CHLORIDE 9 MG/ML
75 INJECTION, SOLUTION INTRAVENOUS CONTINUOUS
Status: DISCONTINUED | OUTPATIENT
Start: 2017-12-19 | End: 2017-12-20

## 2017-12-19 RX ORDER — DIAZEPAM 2 MG/1
2 TABLET ORAL EVERY 8 HOURS PRN
Status: DISCONTINUED | OUTPATIENT
Start: 2017-12-19 | End: 2017-12-23 | Stop reason: HOSPADM

## 2017-12-19 RX ORDER — MORPHINE SULFATE 1 MG/ML
1 INJECTION, SOLUTION EPIDURAL; INTRATHECAL; INTRAVENOUS EVERY 4 HOURS PRN
Status: DISCONTINUED | OUTPATIENT
Start: 2017-12-19 | End: 2017-12-19 | Stop reason: CLARIF

## 2017-12-19 RX ORDER — MORPHINE SULFATE 8 MG/ML
1 INJECTION INTRAMUSCULAR; INTRAVENOUS; SUBCUTANEOUS EVERY 4 HOURS PRN
Status: DISCONTINUED | OUTPATIENT
Start: 2017-12-19 | End: 2017-12-23 | Stop reason: HOSPADM

## 2017-12-19 RX ORDER — MORPHINE SULFATE 8 MG/ML
4 INJECTION INTRAMUSCULAR; INTRAVENOUS; SUBCUTANEOUS ONCE
Status: COMPLETED | OUTPATIENT
Start: 2017-12-19 | End: 2017-12-19

## 2017-12-19 RX ORDER — ONDANSETRON 2 MG/ML
4 INJECTION INTRAMUSCULAR; INTRAVENOUS EVERY 6 HOURS PRN
Status: DISCONTINUED | OUTPATIENT
Start: 2017-12-19 | End: 2017-12-23 | Stop reason: HOSPADM

## 2017-12-19 RX ORDER — ONDANSETRON 2 MG/ML
4 INJECTION INTRAMUSCULAR; INTRAVENOUS ONCE
Status: COMPLETED | OUTPATIENT
Start: 2017-12-19 | End: 2017-12-19

## 2017-12-19 RX ADMIN — SODIUM CHLORIDE 75 ML/HR: 9 INJECTION, SOLUTION INTRAVENOUS at 15:40

## 2017-12-19 RX ADMIN — WATER 1 G: 1 INJECTION INTRAMUSCULAR; INTRAVENOUS; SUBCUTANEOUS at 16:24

## 2017-12-19 RX ADMIN — MORPHINE SULFATE 4 MG: 8 INJECTION, SOLUTION INTRAMUSCULAR; INTRAVENOUS at 11:34

## 2017-12-19 RX ADMIN — ONDANSETRON 4 MG: 2 INJECTION INTRAMUSCULAR; INTRAVENOUS at 21:03

## 2017-12-19 RX ADMIN — MORPHINE SULFATE 1 MG: 8 INJECTION, SOLUTION INTRAMUSCULAR; INTRAVENOUS at 21:03

## 2017-12-19 RX ADMIN — MORPHINE SULFATE 1 MG: 8 INJECTION, SOLUTION INTRAMUSCULAR; INTRAVENOUS at 15:40

## 2017-12-19 RX ADMIN — ONDANSETRON 4 MG: 2 INJECTION INTRAMUSCULAR; INTRAVENOUS at 11:34

## 2017-12-20 ENCOUNTER — ANESTHESIA EVENT (OUTPATIENT)
Dept: PERIOP | Facility: HOSPITAL | Age: 77
End: 2017-12-20

## 2017-12-20 LAB
ALBUMIN SERPL-MCNC: 3.6 G/DL (ref 3.4–4.8)
ALBUMIN/GLOB SERPL: 1.1 G/DL (ref 1.1–1.8)
ALP SERPL-CCNC: 54 U/L (ref 38–126)
ALT SERPL W P-5'-P-CCNC: 24 U/L (ref 9–52)
ANION GAP SERPL CALCULATED.3IONS-SCNC: 8 MMOL/L (ref 5–15)
AST SERPL-CCNC: 20 U/L (ref 14–36)
BACTERIA SPEC AEROBE CULT: NORMAL
BASOPHILS # BLD AUTO: 0.01 10*3/MM3 (ref 0–0.2)
BASOPHILS NFR BLD AUTO: 0.1 % (ref 0–2)
BILIRUB SERPL-MCNC: 0.5 MG/DL (ref 0.2–1.3)
BUN BLD-MCNC: 13 MG/DL (ref 7–21)
BUN/CREAT SERPL: 20 (ref 7–25)
CALCIUM SPEC-SCNC: 8.5 MG/DL (ref 8.4–10.2)
CHLORIDE SERPL-SCNC: 101 MMOL/L (ref 95–110)
CO2 SERPL-SCNC: 28 MMOL/L (ref 22–31)
CREAT BLD-MCNC: 0.65 MG/DL (ref 0.5–1)
DEPRECATED RDW RBC AUTO: 43.5 FL (ref 36.4–46.3)
EOSINOPHIL # BLD AUTO: 0.22 10*3/MM3 (ref 0–0.7)
EOSINOPHIL NFR BLD AUTO: 2.5 % (ref 0–7)
ERYTHROCYTE [DISTWIDTH] IN BLOOD BY AUTOMATED COUNT: 13.6 % (ref 11.5–14.5)
GFR SERPL CREATININE-BSD FRML MDRD: 88 ML/MIN/1.73 (ref 39–90)
GLOBULIN UR ELPH-MCNC: 3.3 GM/DL (ref 2.3–3.5)
GLUCOSE BLD-MCNC: 109 MG/DL (ref 60–100)
HCT VFR BLD AUTO: 36.4 % (ref 35–45)
HGB BLD-MCNC: 11.7 G/DL (ref 12–15.5)
IMM GRANULOCYTES # BLD: 0.02 10*3/MM3 (ref 0–0.02)
IMM GRANULOCYTES NFR BLD: 0.2 % (ref 0–0.5)
LYMPHOCYTES # BLD AUTO: 1.6 10*3/MM3 (ref 0.6–4.2)
LYMPHOCYTES NFR BLD AUTO: 18 % (ref 10–50)
MCH RBC QN AUTO: 28.3 PG (ref 26.5–34)
MCHC RBC AUTO-ENTMCNC: 32.1 G/DL (ref 31.4–36)
MCV RBC AUTO: 88.1 FL (ref 80–98)
MONOCYTES # BLD AUTO: 0.52 10*3/MM3 (ref 0–0.9)
MONOCYTES NFR BLD AUTO: 5.8 % (ref 0–12)
NEUTROPHILS # BLD AUTO: 6.54 10*3/MM3 (ref 2–8.6)
NEUTROPHILS NFR BLD AUTO: 73.4 % (ref 37–80)
PLATELET # BLD AUTO: 195 10*3/MM3 (ref 150–450)
PMV BLD AUTO: 10.5 FL (ref 8–12)
POTASSIUM BLD-SCNC: 3.7 MMOL/L (ref 3.5–5.1)
PROT SERPL-MCNC: 6.9 G/DL (ref 6.3–8.6)
RBC # BLD AUTO: 4.13 10*6/MM3 (ref 3.77–5.16)
SODIUM BLD-SCNC: 137 MMOL/L (ref 137–145)
TROPONIN I SERPL-MCNC: <0.012 NG/ML
WBC NRBC COR # BLD: 8.91 10*3/MM3 (ref 3.2–9.8)

## 2017-12-20 PROCEDURE — 85025 COMPLETE CBC W/AUTO DIFF WBC: CPT | Performed by: INTERNAL MEDICINE

## 2017-12-20 PROCEDURE — 84484 ASSAY OF TROPONIN QUANT: CPT | Performed by: INTERNAL MEDICINE

## 2017-12-20 PROCEDURE — 25010000002 MORPHINE PER 10 MG: Performed by: INTERNAL MEDICINE

## 2017-12-20 PROCEDURE — 99221 1ST HOSP IP/OBS SF/LOW 40: CPT | Performed by: ORTHOPAEDIC SURGERY

## 2017-12-20 PROCEDURE — 25010000002 ONDANSETRON PER 1 MG: Performed by: INTERNAL MEDICINE

## 2017-12-20 PROCEDURE — 25010000002 HEPARIN (PORCINE) PER 1000 UNITS: Performed by: INTERNAL MEDICINE

## 2017-12-20 PROCEDURE — 25010000002 CEFTRIAXONE PER 250 MG: Performed by: INTERNAL MEDICINE

## 2017-12-20 PROCEDURE — 80053 COMPREHEN METABOLIC PANEL: CPT | Performed by: INTERNAL MEDICINE

## 2017-12-20 RX ORDER — SODIUM CHLORIDE AND POTASSIUM CHLORIDE 150; 900 MG/100ML; MG/100ML
75 INJECTION, SOLUTION INTRAVENOUS CONTINUOUS
Status: DISCONTINUED | OUTPATIENT
Start: 2017-12-21 | End: 2017-12-22

## 2017-12-20 RX ORDER — ACETAMINOPHEN 325 MG/1
650 TABLET ORAL EVERY 4 HOURS PRN
Status: DISCONTINUED | OUTPATIENT
Start: 2017-12-20 | End: 2017-12-23 | Stop reason: HOSPADM

## 2017-12-20 RX ORDER — PROMETHAZINE HYDROCHLORIDE 25 MG/ML
12.5 INJECTION, SOLUTION INTRAMUSCULAR; INTRAVENOUS EVERY 4 HOURS PRN
Status: DISCONTINUED | OUTPATIENT
Start: 2017-12-20 | End: 2017-12-23 | Stop reason: HOSPADM

## 2017-12-20 RX ORDER — HEPARIN SODIUM 5000 [USP'U]/ML
5000 INJECTION, SOLUTION INTRAVENOUS; SUBCUTANEOUS EVERY 12 HOURS SCHEDULED
Status: DISCONTINUED | OUTPATIENT
Start: 2017-12-20 | End: 2017-12-22 | Stop reason: DRUGHIGH

## 2017-12-20 RX ADMIN — MORPHINE SULFATE 1 MG: 8 INJECTION, SOLUTION INTRAMUSCULAR; INTRAVENOUS at 15:29

## 2017-12-20 RX ADMIN — ONDANSETRON 4 MG: 2 INJECTION INTRAMUSCULAR; INTRAVENOUS at 07:53

## 2017-12-20 RX ADMIN — CEFTRIAXONE SODIUM 1 G: 1 INJECTION, POWDER, FOR SOLUTION INTRAMUSCULAR; INTRAVENOUS at 15:22

## 2017-12-20 RX ADMIN — ACETAMINOPHEN 650 MG: 325 TABLET ORAL at 01:27

## 2017-12-20 RX ADMIN — ACETAMINOPHEN 650 MG: 325 TABLET ORAL at 07:53

## 2017-12-20 RX ADMIN — HEPARIN SODIUM 5000 UNITS: 5000 INJECTION, SOLUTION INTRAVENOUS; SUBCUTANEOUS at 12:33

## 2017-12-20 RX ADMIN — MORPHINE SULFATE 1 MG: 8 INJECTION, SOLUTION INTRAMUSCULAR; INTRAVENOUS at 01:32

## 2017-12-20 RX ADMIN — MORPHINE SULFATE 1 MG: 8 INJECTION, SOLUTION INTRAMUSCULAR; INTRAVENOUS at 22:14

## 2017-12-20 RX ADMIN — MORPHINE SULFATE 1 MG: 8 INJECTION, SOLUTION INTRAMUSCULAR; INTRAVENOUS at 04:41

## 2017-12-20 RX ADMIN — SODIUM CHLORIDE 75 ML/HR: 9 INJECTION, SOLUTION INTRAVENOUS at 04:41

## 2017-12-20 RX ADMIN — MORPHINE SULFATE 1 MG: 8 INJECTION, SOLUTION INTRAMUSCULAR; INTRAVENOUS at 09:43

## 2017-12-21 ENCOUNTER — ANESTHESIA (OUTPATIENT)
Dept: PERIOP | Facility: HOSPITAL | Age: 77
End: 2017-12-21

## 2017-12-21 ENCOUNTER — APPOINTMENT (OUTPATIENT)
Dept: GENERAL RADIOLOGY | Facility: HOSPITAL | Age: 77
End: 2017-12-21

## 2017-12-21 LAB
ABO GROUP BLD: NORMAL
ALBUMIN SERPL-MCNC: 3.4 G/DL (ref 3.4–4.8)
ALBUMIN/GLOB SERPL: 1 G/DL (ref 1.1–1.8)
ALP SERPL-CCNC: 56 U/L (ref 38–126)
ALT SERPL W P-5'-P-CCNC: 23 U/L (ref 9–52)
ANION GAP SERPL CALCULATED.3IONS-SCNC: 12 MMOL/L (ref 5–15)
AST SERPL-CCNC: 17 U/L (ref 14–36)
BASOPHILS # BLD AUTO: 0.01 10*3/MM3 (ref 0–0.2)
BASOPHILS NFR BLD AUTO: 0.1 % (ref 0–2)
BILIRUB SERPL-MCNC: 0.5 MG/DL (ref 0.2–1.3)
BLD GP AB SCN SERPL QL: NEGATIVE
BUN BLD-MCNC: 13 MG/DL (ref 7–21)
BUN/CREAT SERPL: 22 (ref 7–25)
CALCIUM SPEC-SCNC: 8.4 MG/DL (ref 8.4–10.2)
CHLORIDE SERPL-SCNC: 101 MMOL/L (ref 95–110)
CO2 SERPL-SCNC: 27 MMOL/L (ref 22–31)
CREAT BLD-MCNC: 0.59 MG/DL (ref 0.5–1)
DEPRECATED RDW RBC AUTO: 44.4 FL (ref 36.4–46.3)
EOSINOPHIL # BLD AUTO: 0.2 10*3/MM3 (ref 0–0.7)
EOSINOPHIL NFR BLD AUTO: 2 % (ref 0–7)
ERYTHROCYTE [DISTWIDTH] IN BLOOD BY AUTOMATED COUNT: 13.5 % (ref 11.5–14.5)
GFR SERPL CREATININE-BSD FRML MDRD: 99 ML/MIN/1.73 (ref 60–90)
GLOBULIN UR ELPH-MCNC: 3.4 GM/DL (ref 2.3–3.5)
GLUCOSE BLD-MCNC: 97 MG/DL (ref 60–100)
HCT VFR BLD AUTO: 37.3 % (ref 35–45)
HGB BLD-MCNC: 12.1 G/DL (ref 12–15.5)
IMM GRANULOCYTES # BLD: 0.02 10*3/MM3 (ref 0–0.02)
IMM GRANULOCYTES NFR BLD: 0.2 % (ref 0–0.5)
LYMPHOCYTES # BLD AUTO: 0.93 10*3/MM3 (ref 0.6–4.2)
LYMPHOCYTES NFR BLD AUTO: 9.1 % (ref 10–50)
Lab: NORMAL
MCH RBC QN AUTO: 29.2 PG (ref 26.5–34)
MCHC RBC AUTO-ENTMCNC: 32.4 G/DL (ref 31.4–36)
MCV RBC AUTO: 90.1 FL (ref 80–98)
MONOCYTES # BLD AUTO: 0.27 10*3/MM3 (ref 0–0.9)
MONOCYTES NFR BLD AUTO: 2.7 % (ref 0–12)
NEUTROPHILS # BLD AUTO: 8.75 10*3/MM3 (ref 2–8.6)
NEUTROPHILS NFR BLD AUTO: 85.9 % (ref 37–80)
NRBC BLD MANUAL-RTO: 0 /100 WBC (ref 0–0)
PLATELET # BLD AUTO: 187 10*3/MM3 (ref 150–450)
PMV BLD AUTO: 9.7 FL (ref 8–12)
POTASSIUM BLD-SCNC: 3.9 MMOL/L (ref 3.5–5.1)
PROT SERPL-MCNC: 6.8 G/DL (ref 6.3–8.6)
RBC # BLD AUTO: 4.14 10*6/MM3 (ref 3.77–5.16)
RH BLD: NEGATIVE
SODIUM BLD-SCNC: 140 MMOL/L (ref 137–145)
WBC NRBC COR # BLD: 10.18 10*3/MM3 (ref 3.2–9.8)

## 2017-12-21 PROCEDURE — 25010000002 DEXAMETHASONE PER 1 MG: Performed by: NURSE ANESTHETIST, CERTIFIED REGISTERED

## 2017-12-21 PROCEDURE — 25010000002 FENTANYL CITRATE (PF) 100 MCG/2ML SOLUTION: Performed by: NURSE ANESTHETIST, CERTIFIED REGISTERED

## 2017-12-21 PROCEDURE — 86900 BLOOD TYPING SEROLOGIC ABO: CPT | Performed by: ANESTHESIOLOGY

## 2017-12-21 PROCEDURE — 97162 PT EVAL MOD COMPLEX 30 MIN: CPT | Performed by: PHYSICAL THERAPIST

## 2017-12-21 PROCEDURE — 94799 UNLISTED PULMONARY SVC/PX: CPT

## 2017-12-21 PROCEDURE — 76000 FLUOROSCOPY <1 HR PHYS/QHP: CPT

## 2017-12-21 PROCEDURE — 86850 RBC ANTIBODY SCREEN: CPT | Performed by: ANESTHESIOLOGY

## 2017-12-21 PROCEDURE — 73502 X-RAY EXAM HIP UNI 2-3 VIEWS: CPT | Performed by: ORTHOPAEDIC SURGERY

## 2017-12-21 PROCEDURE — 80053 COMPREHEN METABOLIC PANEL: CPT | Performed by: INTERNAL MEDICINE

## 2017-12-21 PROCEDURE — 85025 COMPLETE CBC W/AUTO DIFF WBC: CPT | Performed by: INTERNAL MEDICINE

## 2017-12-21 PROCEDURE — C1713 ANCHOR/SCREW BN/BN,TIS/BN: HCPCS | Performed by: ORTHOPAEDIC SURGERY

## 2017-12-21 PROCEDURE — 94760 N-INVAS EAR/PLS OXIMETRY 1: CPT

## 2017-12-21 PROCEDURE — G8979 MOBILITY GOAL STATUS: HCPCS | Performed by: PHYSICAL THERAPIST

## 2017-12-21 PROCEDURE — 25010000002 ONDANSETRON PER 1 MG: Performed by: INTERNAL MEDICINE

## 2017-12-21 PROCEDURE — 25010000002 PROPOFOL 10 MG/ML EMULSION: Performed by: NURSE ANESTHETIST, CERTIFIED REGISTERED

## 2017-12-21 PROCEDURE — 25810000003 SODIUM CHLORIDE 0.9 % WITH KCL 20 MEQ 20-0.9 MEQ/L-% SOLUTION: Performed by: INTERNAL MEDICINE

## 2017-12-21 PROCEDURE — 25010000002 HYDROMORPHONE PER 4 MG: Performed by: NURSE ANESTHETIST, CERTIFIED REGISTERED

## 2017-12-21 PROCEDURE — 25010000002 MIDAZOLAM PER 1 MG: Performed by: NURSE ANESTHETIST, CERTIFIED REGISTERED

## 2017-12-21 PROCEDURE — 86901 BLOOD TYPING SEROLOGIC RH(D): CPT | Performed by: ANESTHESIOLOGY

## 2017-12-21 PROCEDURE — G8978 MOBILITY CURRENT STATUS: HCPCS | Performed by: PHYSICAL THERAPIST

## 2017-12-21 PROCEDURE — 25010000002 CEFTRIAXONE PER 250 MG: Performed by: INTERNAL MEDICINE

## 2017-12-21 PROCEDURE — 97530 THERAPEUTIC ACTIVITIES: CPT | Performed by: PHYSICAL THERAPIST

## 2017-12-21 PROCEDURE — 25810000003 SODIUM CHLORIDE 0.9 % WITH KCL 20 MEQ 20-0.9 MEQ/L-% SOLUTION: Performed by: ORTHOPAEDIC SURGERY

## 2017-12-21 PROCEDURE — 0QH634Z INSERTION OF INTERNAL FIXATION DEVICE INTO RIGHT UPPER FEMUR, PERCUTANEOUS APPROACH: ICD-10-PCS | Performed by: ORTHOPAEDIC SURGERY

## 2017-12-21 PROCEDURE — 25010000003 CEFAZOLIN PER 500 MG: Performed by: ORTHOPAEDIC SURGERY

## 2017-12-21 PROCEDURE — 27236 TREAT THIGH FRACTURE: CPT | Performed by: ORTHOPAEDIC SURGERY

## 2017-12-21 PROCEDURE — 25010000002 PHENYLEPHRINE PER 1 ML: Performed by: NURSE ANESTHETIST, CERTIFIED REGISTERED

## 2017-12-21 PROCEDURE — 25010000002 MORPHINE PER 10 MG: Performed by: INTERNAL MEDICINE

## 2017-12-21 PROCEDURE — 25010000002 HEPARIN (PORCINE) PER 1000 UNITS: Performed by: INTERNAL MEDICINE

## 2017-12-21 DEVICE — SCRW CANN THRD 7.3X16X95MM: Type: IMPLANTABLE DEVICE | Site: HIP | Status: FUNCTIONAL

## 2017-12-21 DEVICE — SCRW CANN THRD 7.3X16X390MM: Type: IMPLANTABLE DEVICE | Site: HIP | Status: FUNCTIONAL

## 2017-12-21 RX ORDER — ACETAMINOPHEN 650 MG/1
650 SUPPOSITORY RECTAL ONCE AS NEEDED
Status: DISCONTINUED | OUTPATIENT
Start: 2017-12-21 | End: 2017-12-21 | Stop reason: HOSPADM

## 2017-12-21 RX ORDER — PROPOFOL 10 MG/ML
VIAL (ML) INTRAVENOUS AS NEEDED
Status: DISCONTINUED | OUTPATIENT
Start: 2017-12-21 | End: 2017-12-21 | Stop reason: SURG

## 2017-12-21 RX ORDER — SODIUM CHLORIDE, SODIUM GLUCONATE, SODIUM ACETATE, POTASSIUM CHLORIDE, AND MAGNESIUM CHLORIDE 526; 502; 368; 37; 30 MG/100ML; MG/100ML; MG/100ML; MG/100ML; MG/100ML
INJECTION, SOLUTION INTRAVENOUS CONTINUOUS PRN
Status: DISCONTINUED | OUTPATIENT
Start: 2017-12-21 | End: 2017-12-21 | Stop reason: SURG

## 2017-12-21 RX ORDER — LABETALOL HYDROCHLORIDE 5 MG/ML
5 INJECTION, SOLUTION INTRAVENOUS
Status: DISCONTINUED | OUTPATIENT
Start: 2017-12-21 | End: 2017-12-21 | Stop reason: HOSPADM

## 2017-12-21 RX ORDER — EPHEDRINE SULFATE 50 MG/ML
5 INJECTION, SOLUTION INTRAVENOUS ONCE AS NEEDED
Status: DISCONTINUED | OUTPATIENT
Start: 2017-12-21 | End: 2017-12-21 | Stop reason: HOSPADM

## 2017-12-21 RX ORDER — SODIUM CHLORIDE AND POTASSIUM CHLORIDE 150; 900 MG/100ML; MG/100ML
100 INJECTION, SOLUTION INTRAVENOUS CONTINUOUS
Status: DISCONTINUED | OUTPATIENT
Start: 2017-12-21 | End: 2017-12-22

## 2017-12-21 RX ORDER — BACITRACIN 50000 [IU]/1
INJECTION, POWDER, FOR SOLUTION INTRAMUSCULAR AS NEEDED
Status: DISCONTINUED | OUTPATIENT
Start: 2017-12-21 | End: 2017-12-23 | Stop reason: HOSPADM

## 2017-12-21 RX ORDER — DEXAMETHASONE SODIUM PHOSPHATE 4 MG/ML
INJECTION, SOLUTION INTRA-ARTICULAR; INTRALESIONAL; INTRAMUSCULAR; INTRAVENOUS; SOFT TISSUE AS NEEDED
Status: DISCONTINUED | OUTPATIENT
Start: 2017-12-21 | End: 2017-12-21 | Stop reason: SURG

## 2017-12-21 RX ORDER — BACTERIOSTATIC SODIUM CHLORIDE 0.9 %
VIAL (ML) INJECTION AS NEEDED
Status: DISCONTINUED | OUTPATIENT
Start: 2017-12-21 | End: 2017-12-23 | Stop reason: HOSPADM

## 2017-12-21 RX ORDER — HYDROMORPHONE HCL 110MG/55ML
0.5 PATIENT CONTROLLED ANALGESIA SYRINGE INTRAVENOUS
Status: DISCONTINUED | OUTPATIENT
Start: 2017-12-21 | End: 2017-12-21 | Stop reason: HOSPADM

## 2017-12-21 RX ORDER — MORPHINE SULFATE 8 MG/ML
4 INJECTION INTRAMUSCULAR; INTRAVENOUS; SUBCUTANEOUS EVERY 4 HOURS PRN
Status: DISCONTINUED | OUTPATIENT
Start: 2017-12-21 | End: 2017-12-23 | Stop reason: HOSPADM

## 2017-12-21 RX ORDER — DOCUSATE SODIUM 100 MG/1
100 CAPSULE, LIQUID FILLED ORAL 2 TIMES DAILY
Status: DISCONTINUED | OUTPATIENT
Start: 2017-12-21 | End: 2017-12-23 | Stop reason: HOSPADM

## 2017-12-21 RX ORDER — MIDAZOLAM HYDROCHLORIDE 1 MG/ML
INJECTION INTRAMUSCULAR; INTRAVENOUS AS NEEDED
Status: DISCONTINUED | OUTPATIENT
Start: 2017-12-21 | End: 2017-12-21 | Stop reason: SURG

## 2017-12-21 RX ORDER — FENTANYL CITRATE 50 UG/ML
INJECTION, SOLUTION INTRAMUSCULAR; INTRAVENOUS AS NEEDED
Status: DISCONTINUED | OUTPATIENT
Start: 2017-12-21 | End: 2017-12-21 | Stop reason: SURG

## 2017-12-21 RX ORDER — LIDOCAINE HYDROCHLORIDE 20 MG/ML
INJECTION, SOLUTION INFILTRATION; PERINEURAL AS NEEDED
Status: DISCONTINUED | OUTPATIENT
Start: 2017-12-21 | End: 2017-12-21 | Stop reason: SURG

## 2017-12-21 RX ORDER — ACETAMINOPHEN 325 MG/1
650 TABLET ORAL ONCE AS NEEDED
Status: DISCONTINUED | OUTPATIENT
Start: 2017-12-21 | End: 2017-12-21 | Stop reason: HOSPADM

## 2017-12-21 RX ORDER — PROMETHAZINE HYDROCHLORIDE 25 MG/ML
12.5 INJECTION, SOLUTION INTRAMUSCULAR; INTRAVENOUS EVERY 6 HOURS PRN
Status: DISCONTINUED | OUTPATIENT
Start: 2017-12-21 | End: 2017-12-23 | Stop reason: HOSPADM

## 2017-12-21 RX ORDER — OXYCODONE HYDROCHLORIDE AND ACETAMINOPHEN 5; 325 MG/1; MG/1
1 TABLET ORAL EVERY 4 HOURS PRN
Status: DISCONTINUED | OUTPATIENT
Start: 2017-12-21 | End: 2017-12-23 | Stop reason: HOSPADM

## 2017-12-21 RX ORDER — ONDANSETRON 2 MG/ML
4 INJECTION INTRAMUSCULAR; INTRAVENOUS ONCE AS NEEDED
Status: DISCONTINUED | OUTPATIENT
Start: 2017-12-21 | End: 2017-12-21 | Stop reason: HOSPADM

## 2017-12-21 RX ORDER — FLUMAZENIL 0.1 MG/ML
0.2 INJECTION INTRAVENOUS AS NEEDED
Status: DISCONTINUED | OUTPATIENT
Start: 2017-12-21 | End: 2017-12-21 | Stop reason: HOSPADM

## 2017-12-21 RX ORDER — NALOXONE HCL 0.4 MG/ML
0.2 VIAL (ML) INJECTION AS NEEDED
Status: DISCONTINUED | OUTPATIENT
Start: 2017-12-21 | End: 2017-12-21 | Stop reason: HOSPADM

## 2017-12-21 RX ORDER — DIPHENHYDRAMINE HYDROCHLORIDE 50 MG/ML
12.5 INJECTION INTRAMUSCULAR; INTRAVENOUS
Status: DISCONTINUED | OUTPATIENT
Start: 2017-12-21 | End: 2017-12-21 | Stop reason: HOSPADM

## 2017-12-21 RX ADMIN — FENTANYL CITRATE 25 MCG: 50 INJECTION, SOLUTION INTRAMUSCULAR; INTRAVENOUS at 10:47

## 2017-12-21 RX ADMIN — MORPHINE SULFATE 1 MG: 8 INJECTION, SOLUTION INTRAMUSCULAR; INTRAVENOUS at 01:19

## 2017-12-21 RX ADMIN — POTASSIUM CHLORIDE AND SODIUM CHLORIDE 75 ML/HR: 900; 150 INJECTION, SOLUTION INTRAVENOUS at 01:39

## 2017-12-21 RX ADMIN — SODIUM CHLORIDE, SODIUM GLUCONATE, SODIUM ACETATE, POTASSIUM CHLORIDE, AND MAGNESIUM CHLORIDE: 526; 502; 368; 37; 30 INJECTION, SOLUTION INTRAVENOUS at 09:59

## 2017-12-21 RX ADMIN — PHENYLEPHRINE HYDROCHLORIDE 100 MCG: 10 INJECTION INTRAVENOUS at 10:22

## 2017-12-21 RX ADMIN — ONDANSETRON 4 MG: 2 INJECTION INTRAMUSCULAR; INTRAVENOUS at 10:40

## 2017-12-21 RX ADMIN — LIDOCAINE HYDROCHLORIDE 60 MG: 20 INJECTION, SOLUTION INFILTRATION; PERINEURAL at 10:10

## 2017-12-21 RX ADMIN — DOCUSATE SODIUM 100 MG: 100 CAPSULE, LIQUID FILLED ORAL at 21:00

## 2017-12-21 RX ADMIN — MORPHINE SULFATE 1 MG: 8 INJECTION, SOLUTION INTRAMUSCULAR; INTRAVENOUS at 06:26

## 2017-12-21 RX ADMIN — DEXAMETHASONE SODIUM PHOSPHATE 4 MG: 4 INJECTION, SOLUTION INTRAMUSCULAR; INTRAVENOUS at 10:40

## 2017-12-21 RX ADMIN — HYDROMORPHONE HYDROCHLORIDE 0.5 MG: 2 INJECTION INTRAMUSCULAR; INTRAVENOUS; SUBCUTANEOUS at 11:50

## 2017-12-21 RX ADMIN — HYDROMORPHONE HYDROCHLORIDE 0.5 MG: 2 INJECTION INTRAMUSCULAR; INTRAVENOUS; SUBCUTANEOUS at 11:30

## 2017-12-21 RX ADMIN — MIDAZOLAM 1 MG: 1 INJECTION INTRAMUSCULAR; INTRAVENOUS at 09:53

## 2017-12-21 RX ADMIN — OXYCODONE HYDROCHLORIDE AND ACETAMINOPHEN 1 TABLET: 5; 325 TABLET ORAL at 18:30

## 2017-12-21 RX ADMIN — MIDAZOLAM 1 MG: 1 INJECTION INTRAMUSCULAR; INTRAVENOUS at 10:02

## 2017-12-21 RX ADMIN — CEFTRIAXONE SODIUM 1 G: 1 INJECTION, POWDER, FOR SOLUTION INTRAMUSCULAR; INTRAVENOUS at 17:09

## 2017-12-21 RX ADMIN — POTASSIUM CHLORIDE AND SODIUM CHLORIDE 100 ML/HR: 900; 150 INJECTION, SOLUTION INTRAVENOUS at 14:44

## 2017-12-21 RX ADMIN — DOCUSATE SODIUM 100 MG: 100 CAPSULE, LIQUID FILLED ORAL at 14:44

## 2017-12-21 RX ADMIN — FENTANYL CITRATE 25 MCG: 50 INJECTION, SOLUTION INTRAMUSCULAR; INTRAVENOUS at 10:55

## 2017-12-21 RX ADMIN — PROPOFOL 100 MG: 10 INJECTION, EMULSION INTRAVENOUS at 10:10

## 2017-12-21 RX ADMIN — HEPARIN SODIUM 5000 UNITS: 5000 INJECTION, SOLUTION INTRAVENOUS; SUBCUTANEOUS at 21:00

## 2017-12-21 RX ADMIN — CEFAZOLIN SODIUM 1 G: 1 INJECTION, POWDER, FOR SOLUTION INTRAMUSCULAR; INTRAVENOUS at 10:10

## 2017-12-21 RX ADMIN — FENTANYL CITRATE 50 MCG: 50 INJECTION, SOLUTION INTRAMUSCULAR; INTRAVENOUS at 10:04

## 2017-12-21 NOTE — ANESTHESIA PROCEDURE NOTES
Airway  Date/Time: 12/21/2017 10:11 AM  End Time:12/21/2017 10:11 AM  Airway not difficult    General Information and Staff    Patient location during procedure: OR  CRNA: SWETHA FORBES    Indications and Patient Condition  Indications for airway management: airway protection    Preoxygenated: yes  MILS maintained throughout  Mask difficulty assessment: 1 - vent by mask    Final Airway Details  Final airway type: supraglottic airway      Successful airway: classic  Size 3    Number of attempts at approach: 1

## 2017-12-21 NOTE — ANESTHESIA PREPROCEDURE EVALUATION
Anesthesia Evaluation     NPO Solid Status: > 8 hours  NPO Liquid Status: > 8 hours     Airway   Mallampati: II  TM distance: >3 FB  Neck ROM: full  no difficulty expected  Dental    (+) poor dentition    Pulmonary     breath sounds clear to auscultation  (+) recent URI resolved,   Cardiovascular     ECG reviewed  Rhythm: regular  Rate: normal    (+) hyperlipidemia      Neuro/Psych  (+) dizziness/light headedness, numbness, psychiatric history Anxiety,    GI/Hepatic/Renal/Endo    (+)  hepatitis, liver disease,     Musculoskeletal     Abdominal     Abdomen: soft.   Substance History      OB/GYN          Other   (+) arthritis   history of cancer                                    Anesthesia Plan    ASA 3     general     intravenous induction   Anesthetic plan and risks discussed with patient.

## 2017-12-21 NOTE — ANESTHESIA POSTPROCEDURE EVALUATION
Patient: Sara Solis    Procedure Summary     Date Anesthesia Start Anesthesia Stop Room / Location    12/21/17 1002 1102 [facility] OR 11 / [facility] OR       Procedure Diagnosis Surgeon Provider    HIP PINNING                 (C-ARM#3) (Right Hip) Closed subcapital fracture of right femur, initial encounter  (Closed subcapital fracture of right femur, initial encounter [S72.011A]) MD Christoph Ansari MD          Anesthesia Type: general  Last vitals  BP   116/56 (12/21/17 0907)   Temp   (!) 100.7 °F (38.2 °C) (12/21/17 0907)   Pulse   82 (12/21/17 0907)   Resp   18 (12/21/17 0907)     SpO2   96 % (12/21/17 0907)     Post Anesthesia Care and Evaluation    Patient location during evaluation: PACU  Patient participation: complete - patient participated  Level of consciousness: sleepy but conscious  Pain score: 0  Pain management: adequate  Airway patency: patent  Anesthetic complications: No anesthetic complications  PONV Status: none  Cardiovascular status: acceptable  Respiratory status: acceptable  Hydration status: acceptable

## 2017-12-22 LAB
ALBUMIN SERPL-MCNC: 2.9 G/DL (ref 3.4–4.8)
ALBUMIN/GLOB SERPL: 1 G/DL (ref 1.1–1.8)
ALP SERPL-CCNC: 50 U/L (ref 38–126)
ALT SERPL W P-5'-P-CCNC: 21 U/L (ref 9–52)
ANION GAP SERPL CALCULATED.3IONS-SCNC: 9 MMOL/L (ref 5–15)
AST SERPL-CCNC: 16 U/L (ref 14–36)
BASOPHILS # BLD AUTO: 0.01 10*3/MM3 (ref 0–0.2)
BASOPHILS NFR BLD AUTO: 0.1 % (ref 0–2)
BILIRUB SERPL-MCNC: 0.4 MG/DL (ref 0.2–1.3)
BUN BLD-MCNC: 13 MG/DL (ref 7–21)
BUN/CREAT SERPL: 25 (ref 7–25)
CALCIUM SPEC-SCNC: 8 MG/DL (ref 8.4–10.2)
CHLORIDE SERPL-SCNC: 103 MMOL/L (ref 95–110)
CK MB SERPL-CCNC: 2.24 NG/ML (ref 0–5)
CK SERPL-CCNC: 139 U/L (ref 30–135)
CO2 SERPL-SCNC: 26 MMOL/L (ref 22–31)
CREAT BLD-MCNC: 0.52 MG/DL (ref 0.5–1)
DEPRECATED RDW RBC AUTO: 41.5 FL (ref 36.4–46.3)
EOSINOPHIL # BLD AUTO: 0.1 10*3/MM3 (ref 0–0.7)
EOSINOPHIL NFR BLD AUTO: 1.1 % (ref 0–7)
ERYTHROCYTE [DISTWIDTH] IN BLOOD BY AUTOMATED COUNT: 13 % (ref 11.5–14.5)
GFR SERPL CREATININE-BSD FRML MDRD: 114 ML/MIN/1.73 (ref 60–90)
GLOBULIN UR ELPH-MCNC: 3 GM/DL (ref 2.3–3.5)
GLUCOSE BLD-MCNC: 98 MG/DL (ref 60–100)
HCT VFR BLD AUTO: 33.1 % (ref 35–45)
HGB BLD-MCNC: 10.9 G/DL (ref 12–15.5)
IMM GRANULOCYTES # BLD: 0.02 10*3/MM3 (ref 0–0.02)
IMM GRANULOCYTES NFR BLD: 0.2 % (ref 0–0.5)
INR PPP: 1.08 (ref 0.8–1.2)
LYMPHOCYTES # BLD AUTO: 1.88 10*3/MM3 (ref 0.6–4.2)
LYMPHOCYTES # BLD MANUAL: 3.73 10*3/MM3 (ref 0.6–4.2)
LYMPHOCYTES NFR BLD AUTO: 21.1 % (ref 10–50)
LYMPHOCYTES NFR BLD MANUAL: 4 % (ref 0–12)
LYMPHOCYTES NFR BLD MANUAL: 42 % (ref 10–50)
MCH RBC QN AUTO: 28.6 PG (ref 26.5–34)
MCHC RBC AUTO-ENTMCNC: 32.9 G/DL (ref 31.4–36)
MCV RBC AUTO: 86.9 FL (ref 80–98)
MONOCYTES # BLD AUTO: 0.36 10*3/MM3 (ref 0–0.9)
MONOCYTES # BLD AUTO: 0.72 10*3/MM3 (ref 0–0.9)
MONOCYTES NFR BLD AUTO: 8.1 % (ref 0–12)
NEUTROPHILS # BLD AUTO: 4.8 10*3/MM3 (ref 2–8.6)
NEUTROPHILS # BLD AUTO: 6.16 10*3/MM3 (ref 2–8.6)
NEUTROPHILS NFR BLD AUTO: 69.4 % (ref 37–80)
NEUTROPHILS NFR BLD MANUAL: 49 % (ref 37–80)
NEUTS BAND NFR BLD MANUAL: 5 % (ref 0–5)
PLATELET # BLD AUTO: 192 10*3/MM3 (ref 150–450)
PMV BLD AUTO: 10.3 FL (ref 8–12)
POTASSIUM BLD-SCNC: 4.4 MMOL/L (ref 3.5–5.1)
PROT SERPL-MCNC: 5.9 G/DL (ref 6.3–8.6)
PROTHROMBIN TIME: 13.9 SECONDS (ref 11.1–15.3)
RBC # BLD AUTO: 3.81 10*6/MM3 (ref 3.77–5.16)
RBC MORPH BLD: NORMAL
SMALL PLATELETS BLD QL SMEAR: ADEQUATE
SODIUM BLD-SCNC: 138 MMOL/L (ref 137–145)
TROPONIN I SERPL-MCNC: <0.012 NG/ML
TROPONIN I SERPL-MCNC: <0.012 NG/ML
WBC MORPH BLD: NORMAL
WBC NRBC COR # BLD: 8.89 10*3/MM3 (ref 3.2–9.8)

## 2017-12-22 PROCEDURE — 80053 COMPREHEN METABOLIC PANEL: CPT | Performed by: INTERNAL MEDICINE

## 2017-12-22 PROCEDURE — 93010 ELECTROCARDIOGRAM REPORT: CPT | Performed by: INTERNAL MEDICINE

## 2017-12-22 PROCEDURE — 93005 ELECTROCARDIOGRAM TRACING: CPT | Performed by: INTERNAL MEDICINE

## 2017-12-22 PROCEDURE — 82553 CREATINE MB FRACTION: CPT | Performed by: INTERNAL MEDICINE

## 2017-12-22 PROCEDURE — 25010000002 CEFTRIAXONE PER 250 MG: Performed by: INTERNAL MEDICINE

## 2017-12-22 PROCEDURE — 85007 BL SMEAR W/DIFF WBC COUNT: CPT | Performed by: INTERNAL MEDICINE

## 2017-12-22 PROCEDURE — 25810000003 SODIUM CHLORIDE 0.9 % WITH KCL 20 MEQ 20-0.9 MEQ/L-% SOLUTION: Performed by: ORTHOPAEDIC SURGERY

## 2017-12-22 PROCEDURE — 97110 THERAPEUTIC EXERCISES: CPT

## 2017-12-22 PROCEDURE — 85025 COMPLETE CBC W/AUTO DIFF WBC: CPT | Performed by: INTERNAL MEDICINE

## 2017-12-22 PROCEDURE — 84484 ASSAY OF TROPONIN QUANT: CPT | Performed by: INTERNAL MEDICINE

## 2017-12-22 PROCEDURE — 97116 GAIT TRAINING THERAPY: CPT

## 2017-12-22 PROCEDURE — 85610 PROTHROMBIN TIME: CPT | Performed by: INTERNAL MEDICINE

## 2017-12-22 PROCEDURE — 82550 ASSAY OF CK (CPK): CPT | Performed by: INTERNAL MEDICINE

## 2017-12-22 PROCEDURE — 25010000002 HEPARIN (PORCINE) PER 1000 UNITS: Performed by: INTERNAL MEDICINE

## 2017-12-22 RX ORDER — LACTULOSE 10 G/15ML
10 SOLUTION ORAL 2 TIMES DAILY
Status: DISCONTINUED | OUTPATIENT
Start: 2017-12-22 | End: 2017-12-23 | Stop reason: HOSPADM

## 2017-12-22 RX ORDER — WARFARIN SODIUM 5 MG/1
5 TABLET ORAL
Status: DISCONTINUED | OUTPATIENT
Start: 2017-12-22 | End: 2017-12-22 | Stop reason: DRUGHIGH

## 2017-12-22 RX ORDER — CALCIUM CARBONATE 200(500)MG
2 TABLET,CHEWABLE ORAL 3 TIMES DAILY PRN
Status: DISCONTINUED | OUTPATIENT
Start: 2017-12-22 | End: 2017-12-23 | Stop reason: HOSPADM

## 2017-12-22 RX ADMIN — APIXABAN 2.5 MG: 2.5 TABLET, FILM COATED ORAL at 20:20

## 2017-12-22 RX ADMIN — HEPARIN SODIUM 5000 UNITS: 5000 INJECTION, SOLUTION INTRAVENOUS; SUBCUTANEOUS at 08:12

## 2017-12-22 RX ADMIN — ACETAMINOPHEN 650 MG: 325 TABLET ORAL at 21:59

## 2017-12-22 RX ADMIN — CEFTRIAXONE SODIUM 1 G: 1 INJECTION, POWDER, FOR SOLUTION INTRAMUSCULAR; INTRAVENOUS at 16:48

## 2017-12-22 RX ADMIN — LACTULOSE 10 G: 10 SOLUTION ORAL at 12:25

## 2017-12-22 RX ADMIN — OXYCODONE HYDROCHLORIDE AND ACETAMINOPHEN 1 TABLET: 5; 325 TABLET ORAL at 00:11

## 2017-12-22 RX ADMIN — OXYCODONE HYDROCHLORIDE AND ACETAMINOPHEN 1 TABLET: 5; 325 TABLET ORAL at 13:55

## 2017-12-22 RX ADMIN — CALCIUM CARBONATE (ANTACID) CHEW TAB 500 MG 2 TABLET: 500 CHEW TAB at 20:20

## 2017-12-22 RX ADMIN — POTASSIUM CHLORIDE AND SODIUM CHLORIDE 100 ML/HR: 900; 150 INJECTION, SOLUTION INTRAVENOUS at 10:00

## 2017-12-22 RX ADMIN — OXYCODONE HYDROCHLORIDE AND ACETAMINOPHEN 1 TABLET: 5; 325 TABLET ORAL at 06:36

## 2017-12-22 RX ADMIN — POTASSIUM CHLORIDE AND SODIUM CHLORIDE 100 ML/HR: 900; 150 INJECTION, SOLUTION INTRAVENOUS at 00:12

## 2017-12-22 RX ADMIN — DOCUSATE SODIUM 100 MG: 100 CAPSULE, LIQUID FILLED ORAL at 08:12

## 2017-12-22 RX ADMIN — OXYCODONE HYDROCHLORIDE AND ACETAMINOPHEN 1 TABLET: 5; 325 TABLET ORAL at 20:20

## 2017-12-23 VITALS
OXYGEN SATURATION: 92 % | DIASTOLIC BLOOD PRESSURE: 59 MMHG | TEMPERATURE: 98.4 F | BODY MASS INDEX: 24.49 KG/M2 | HEART RATE: 84 BPM | WEIGHT: 147 LBS | HEIGHT: 65 IN | SYSTOLIC BLOOD PRESSURE: 131 MMHG | RESPIRATION RATE: 17 BRPM

## 2017-12-23 LAB
ALBUMIN SERPL-MCNC: 2.9 G/DL (ref 3.4–4.8)
ALBUMIN/GLOB SERPL: 1 G/DL (ref 1.1–1.8)
ALP SERPL-CCNC: 45 U/L (ref 38–126)
ALT SERPL W P-5'-P-CCNC: 16 U/L (ref 9–52)
ANION GAP SERPL CALCULATED.3IONS-SCNC: 9 MMOL/L (ref 5–15)
AST SERPL-CCNC: 17 U/L (ref 14–36)
BASOPHILS # BLD AUTO: 0.01 10*3/MM3 (ref 0–0.2)
BASOPHILS NFR BLD AUTO: 0.2 % (ref 0–2)
BILIRUB SERPL-MCNC: 0.3 MG/DL (ref 0.2–1.3)
BUN BLD-MCNC: 14 MG/DL (ref 7–21)
BUN/CREAT SERPL: 23.3 (ref 7–25)
CALCIUM SPEC-SCNC: 8.3 MG/DL (ref 8.4–10.2)
CHLORIDE SERPL-SCNC: 102 MMOL/L (ref 95–110)
CO2 SERPL-SCNC: 29 MMOL/L (ref 22–31)
CREAT BLD-MCNC: 0.6 MG/DL (ref 0.5–1)
DEPRECATED RDW RBC AUTO: 43.3 FL (ref 36.4–46.3)
EOSINOPHIL # BLD AUTO: 0.48 10*3/MM3 (ref 0–0.7)
EOSINOPHIL NFR BLD AUTO: 7.3 % (ref 0–7)
ERYTHROCYTE [DISTWIDTH] IN BLOOD BY AUTOMATED COUNT: 13.4 % (ref 11.5–14.5)
GFR SERPL CREATININE-BSD FRML MDRD: 97 ML/MIN/1.73 (ref 60–90)
GLOBULIN UR ELPH-MCNC: 3 GM/DL (ref 2.3–3.5)
GLUCOSE BLD-MCNC: 100 MG/DL (ref 60–100)
HCT VFR BLD AUTO: 32.8 % (ref 35–45)
HGB BLD-MCNC: 10.7 G/DL (ref 12–15.5)
IMM GRANULOCYTES # BLD: 0.03 10*3/MM3 (ref 0–0.02)
IMM GRANULOCYTES NFR BLD: 0.5 % (ref 0–0.5)
LYMPHOCYTES # BLD AUTO: 2.59 10*3/MM3 (ref 0.6–4.2)
LYMPHOCYTES NFR BLD AUTO: 39.3 % (ref 10–50)
MCH RBC QN AUTO: 28.7 PG (ref 26.5–34)
MCHC RBC AUTO-ENTMCNC: 32.6 G/DL (ref 31.4–36)
MCV RBC AUTO: 87.9 FL (ref 80–98)
MONOCYTES # BLD AUTO: 0.59 10*3/MM3 (ref 0–0.9)
MONOCYTES NFR BLD AUTO: 9 % (ref 0–12)
NEUTROPHILS # BLD AUTO: 2.89 10*3/MM3 (ref 2–8.6)
NEUTROPHILS NFR BLD AUTO: 43.7 % (ref 37–80)
PLATELET # BLD AUTO: 211 10*3/MM3 (ref 150–450)
PMV BLD AUTO: 10.3 FL (ref 8–12)
POTASSIUM BLD-SCNC: 4.1 MMOL/L (ref 3.5–5.1)
PROT SERPL-MCNC: 5.9 G/DL (ref 6.3–8.6)
RBC # BLD AUTO: 3.73 10*6/MM3 (ref 3.77–5.16)
SODIUM BLD-SCNC: 140 MMOL/L (ref 137–145)
WBC NRBC COR # BLD: 6.59 10*3/MM3 (ref 3.2–9.8)

## 2017-12-23 PROCEDURE — 97535 SELF CARE MNGMENT TRAINING: CPT

## 2017-12-23 PROCEDURE — 80053 COMPREHEN METABOLIC PANEL: CPT | Performed by: INTERNAL MEDICINE

## 2017-12-23 PROCEDURE — 97530 THERAPEUTIC ACTIVITIES: CPT

## 2017-12-23 PROCEDURE — 99024 POSTOP FOLLOW-UP VISIT: CPT | Performed by: ORTHOPAEDIC SURGERY

## 2017-12-23 PROCEDURE — 85025 COMPLETE CBC W/AUTO DIFF WBC: CPT | Performed by: INTERNAL MEDICINE

## 2017-12-23 RX ORDER — OXYCODONE HYDROCHLORIDE AND ACETAMINOPHEN 5; 325 MG/1; MG/1
1 TABLET ORAL EVERY 4 HOURS PRN
Qty: 40 TABLET | Refills: 0 | Status: SHIPPED | OUTPATIENT
Start: 2017-12-23 | End: 2017-12-29 | Stop reason: SDUPTHER

## 2017-12-23 RX ORDER — LACTULOSE 10 G/15ML
10 SOLUTION ORAL 3 TIMES DAILY
Qty: 946 ML | Refills: 1 | Status: SHIPPED | OUTPATIENT
Start: 2017-12-23 | End: 2018-07-19

## 2017-12-23 RX ORDER — ACETAMINOPHEN 325 MG/1
650 TABLET ORAL EVERY 4 HOURS PRN
Start: 2017-12-23 | End: 2020-01-16

## 2017-12-23 RX ADMIN — OXYCODONE HYDROCHLORIDE AND ACETAMINOPHEN 1 TABLET: 5; 325 TABLET ORAL at 09:29

## 2017-12-23 RX ADMIN — OXYCODONE HYDROCHLORIDE AND ACETAMINOPHEN 1 TABLET: 5; 325 TABLET ORAL at 04:09

## 2017-12-23 RX ADMIN — APIXABAN 2.5 MG: 2.5 TABLET, FILM COATED ORAL at 08:28

## 2017-12-24 LAB
BACTERIA SPEC AEROBE CULT: NORMAL
BACTERIA SPEC AEROBE CULT: NORMAL

## 2017-12-29 RX ORDER — OXYCODONE HYDROCHLORIDE AND ACETAMINOPHEN 5; 325 MG/1; MG/1
1 TABLET ORAL EVERY 4 HOURS PRN
Qty: 40 TABLET | Refills: 0 | Status: SHIPPED | OUTPATIENT
Start: 2017-12-29 | End: 2018-01-06

## 2018-01-03 ENCOUNTER — OFFICE VISIT (OUTPATIENT)
Dept: FAMILY MEDICINE CLINIC | Facility: CLINIC | Age: 78
End: 2018-01-03

## 2018-01-03 VITALS
HEART RATE: 98 BPM | DIASTOLIC BLOOD PRESSURE: 68 MMHG | OXYGEN SATURATION: 98 % | SYSTOLIC BLOOD PRESSURE: 132 MMHG | BODY MASS INDEX: 24.36 KG/M2 | HEIGHT: 65 IN | WEIGHT: 146.2 LBS | TEMPERATURE: 97.9 F

## 2018-01-03 DIAGNOSIS — S72.011S: Primary | ICD-10-CM

## 2018-01-03 PROCEDURE — 99213 OFFICE O/P EST LOW 20 MIN: CPT | Performed by: FAMILY MEDICINE

## 2018-01-04 DIAGNOSIS — S72.011S: Primary | ICD-10-CM

## 2018-01-04 NOTE — PROGRESS NOTES
Subjective   Sara Solis is a 77 y.o. female.     History of Present Illness     Fractured right hip with orif dr stewart.  Doing well.  Using a walker.  Doesn't need any pain medicines.  Very pleased with dr maravilla and care at [facility]    Review of Systems   Constitutional: Negative for chills, fatigue and fever.   HENT: Negative for congestion, ear discharge, ear pain, facial swelling, hearing loss, postnasal drip, rhinorrhea, sinus pressure, sore throat, trouble swallowing and voice change.    Eyes: Negative for discharge, redness and visual disturbance.   Respiratory: Negative for cough, chest tightness, shortness of breath and wheezing.    Cardiovascular: Negative for chest pain and palpitations.   Gastrointestinal: Negative for abdominal pain, blood in stool, constipation, diarrhea, nausea and vomiting.   Endocrine: Negative for polydipsia and polyuria.   Genitourinary: Negative for dysuria, flank pain, hematuria and urgency.   Musculoskeletal: Negative for arthralgias, back pain, joint swelling and myalgias.   Skin: Negative for rash.   Neurological: Negative for dizziness, weakness, numbness and headaches.   Hematological: Negative for adenopathy.   Psychiatric/Behavioral: Negative for confusion and sleep disturbance. The patient is not nervous/anxious.        Objective   Physical Exam   Constitutional: She is oriented to person, place, and time. She appears well-developed and well-nourished.   HENT:   Head: Normocephalic and atraumatic.   Right Ear: External ear normal.   Left Ear: External ear normal.   Nose: Nose normal.   Eyes: Conjunctivae and EOM are normal. Pupils are equal, round, and reactive to light.   Neck: Normal range of motion.   Pulmonary/Chest: Effort normal.   Musculoskeletal: Normal range of motion.   Neurological: She is alert and oriented to person, place, and time.   Psychiatric: She has a normal mood and affect. Her behavior is normal. Judgment and thought content normal.    Nursing note and vitals reviewed.      Assessment/Plan   Sara was seen today for leg pain.    Diagnoses and all orders for this visit:    Closed subcapital fracture of right femur, sequela      continiue current care.

## 2018-01-05 ENCOUNTER — OFFICE VISIT (OUTPATIENT)
Dept: ORTHOPEDIC SURGERY | Facility: CLINIC | Age: 78
End: 2018-01-05

## 2018-01-05 VITALS — BODY MASS INDEX: 24.66 KG/M2 | HEIGHT: 65 IN | WEIGHT: 148 LBS

## 2018-01-05 DIAGNOSIS — S72.011S: Primary | ICD-10-CM

## 2018-01-05 PROCEDURE — 99024 POSTOP FOLLOW-UP VISIT: CPT | Performed by: ORTHOPAEDIC SURGERY

## 2018-01-05 NOTE — PROGRESS NOTES
Sara Solis is a 77 y.o. female is s/p       Chief Complaint   Patient presents with   • Right Femur - Fracture, Post-op       HISTORY OF PRESENT ILLNESS: HIP PINNING  Done on 12/21/2017, xrays done today. Patient has homehealth, PT.      Doing well, some pain, which is controlled      No Known Allergies      Current Outpatient Prescriptions:   •  acetaminophen (TYLENOL) 325 MG tablet, Take 2 tablets by mouth Every 4 (Four) Hours As Needed for Mild Pain , Headache or Fever., Disp: , Rfl:   •  apixaban (ELIQUIS) 2.5 MG tablet tablet, Take 1 tablet by mouth Every 12 (Twelve) Hours., Disp: 60 tablet, Rfl: 1  •  Calcium Carb-Cholecalciferol (CALCIUM + D3 PO), Take  by mouth Daily., Disp: , Rfl:   •  cetirizine (zyrTEC) 10 MG tablet, Take 1 tablet by mouth Every Night., Disp: 30 tablet, Rfl: 11  •  cholecalciferol (VITAMIN D3) 1000 UNITS tablet, Take 1,000 Units by mouth Daily., Disp: , Rfl:   •  diazePAM (VALIUM) 5 MG tablet, Take 0.5-1 tablets by mouth Every 8 (Eight) Hours As Needed (vertigo)., Disp: 30 tablet, Rfl: 0  •  fluticasone (FLONASE) 50 MCG/ACT nasal spray, 2 sprays into each nostril Daily., Disp: 1 bottle, Rfl: 11  •  gabapentin (NEURONTIN) 600 MG tablet, 1/2 to one tablet tid prn., Disp: 270 tablet, Rfl: 1  •  lactulose (CHRONULAC) 10 GM/15ML solution, Take 15 mL by mouth 3 (Three) Times a Day., Disp: 946 mL, Rfl: 1  •  Magnesium 100 MG capsule, Take 400 mg by mouth Daily., Disp: , Rfl:   •  Multiple Vitamins-Minerals (CENTRUM SILVER PO), Take  by mouth Daily., Disp: , Rfl:   •  oxyCODONE-acetaminophen (PERCOCET) 5-325 MG per tablet, Take 1 tablet by mouth Every 4 (Four) Hours As Needed for Moderate Pain  or Severe Pain  for up to 8 days., Disp: 40 tablet, Rfl: 0  •  pramipexole (MIRAPEX) 0.5 MG tablet, Take 2 tablets by mouth Every Night., Disp: 180 tablet, Rfl: 3  •  valACYclovir (VALTREX) 1000 MG tablet, Take 2 tablets by mouth 2 (Two) Times a Day., Disp: 4 tablet, Rfl: 11    No fevers or  chills.  No nausea or vomiting.      PHYSICAL EXAMINATION:       Sara Solis is a 77 y.o. female    Patient is awake and alert, answers questions appropriately and is in no apparent distress.    GAIT:     []  Normal  []  Antalgic    Assistive device: []  None  [x]  Walker     []  Crutches  []  Cane     []  Wheelchair  []  Stretcher    Ortho Exam    Ambulating well,   [place] removed  Ambulating with a walker      Right hip xray- femoral neck screws maintain alignment of right femoral neck fracture.      ASSESSMENT:    Diagnoses and all orders for this visit:    Closed subcapital fracture of right femur, sequela          PLAN    Progress with ambulation with assist device  Right hip xray.      Basil Ulrich MD

## 2018-01-17 ENCOUNTER — TELEPHONE (OUTPATIENT)
Dept: FAMILY MEDICINE CLINIC | Facility: CLINIC | Age: 78
End: 2018-01-17

## 2018-01-17 RX ORDER — OXYCODONE HYDROCHLORIDE AND ACETAMINOPHEN 5; 325 MG/1; MG/1
1-2 TABLET ORAL EVERY 6 HOURS PRN
Qty: 40 TABLET | Refills: 0 | Status: SHIPPED | OUTPATIENT
Start: 2018-01-17 | End: 2018-07-19 | Stop reason: SDUPTHER

## 2018-01-24 RX ORDER — GABAPENTIN 600 MG/1
TABLET ORAL
Qty: 270 TABLET | Refills: 1 | Status: SHIPPED | OUTPATIENT
Start: 2018-01-24 | End: 2018-07-23 | Stop reason: SDUPTHER

## 2018-03-01 DIAGNOSIS — S72.011S: Primary | ICD-10-CM

## 2018-03-09 ENCOUNTER — OFFICE VISIT (OUTPATIENT)
Dept: ORTHOPEDIC SURGERY | Facility: CLINIC | Age: 78
End: 2018-03-09

## 2018-03-09 VITALS — BODY MASS INDEX: 24.49 KG/M2 | WEIGHT: 147 LBS | HEIGHT: 65 IN

## 2018-03-09 DIAGNOSIS — S72.011S: Primary | ICD-10-CM

## 2018-03-09 PROCEDURE — 99024 POSTOP FOLLOW-UP VISIT: CPT | Performed by: ORTHOPAEDIC SURGERY

## 2018-03-09 NOTE — PROGRESS NOTES
Postop Follow-up    Name:  Sara Solis  Date:  3/9/2018  :  1940    Chief Complaint:    Chief Complaint   Patient presents with   • Right Hip - Follow-up     Date of surgery:    17 (78d) Basil Ulrich MD     HIP PINNING                 (C-ARM#3) - Right         Procedure:    History of Present Illness: post op right hip  With x-rays done today in office 3/10 Pain scale   Doing well, minimal pain, when getting in and out of bed.  Overall minimal complaints.      Current Outpatient Prescriptions:   •  acetaminophen (TYLENOL) 325 MG tablet, Take 2 tablets by mouth Every 4 (Four) Hours As Needed for Mild Pain , Headache or Fever., Disp: , Rfl:   •  apixaban (ELIQUIS) 2.5 MG tablet tablet, Take 1 tablet by mouth Every 12 (Twelve) Hours., Disp: 60 tablet, Rfl: 1  •  Calcium Carb-Cholecalciferol (CALCIUM + D3 PO), Take  by mouth Daily., Disp: , Rfl:   •  cetirizine (zyrTEC) 10 MG tablet, Take 1 tablet by mouth Every Night., Disp: 30 tablet, Rfl: 11  •  cholecalciferol (VITAMIN D3) 1000 UNITS tablet, Take 1,000 Units by mouth Daily., Disp: , Rfl:   •  diazePAM (VALIUM) 5 MG tablet, Take 0.5-1 tablets by mouth Every 8 (Eight) Hours As Needed (vertigo)., Disp: 30 tablet, Rfl: 0  •  fluticasone (FLONASE) 50 MCG/ACT nasal spray, 2 sprays into each nostril Daily., Disp: 1 bottle, Rfl: 11  •  gabapentin (NEURONTIN) 600 MG tablet, 1/2 to one tablet tid prn., Disp: 270 tablet, Rfl: 1  •  lactulose (CHRONULAC) 10 GM/15ML solution, Take 15 mL by mouth 3 (Three) Times a Day., Disp: 946 mL, Rfl: 1  •  Magnesium 100 MG capsule, Take 400 mg by mouth Daily., Disp: , Rfl:   •  Multiple Vitamins-Minerals (CENTRUM SILVER PO), Take  by mouth Daily., Disp: , Rfl:   •  oxyCODONE-acetaminophen (PERCOCET) 5-325 MG per tablet, Take 1-2 tablets by mouth Every 6 (Six) Hours As Needed for Moderate Pain ., Disp: 40 tablet, Rfl: 0  •  pramipexole (MIRAPEX) 0.5 MG tablet, Take 2 tablets by mouth Every Night., Disp:  "180 tablet, Rfl: 3  •  valACYclovir (VALTREX) 1000 MG tablet, Take 2 tablets by mouth 2 (Two) Times a Day., Disp: 4 tablet, Rfl: 11    No Known Allergies      Exam:  Vitals:    03/09/18 1017   Weight: 66.7 kg (147 lb)   Height: 165.1 cm (65\")       The patient is awake, alert, and oriented and in no apparent distress.    Right upper extremity:    Left upper extremity:    Right lower extremity:    Left lower extremity:  Ambulating without a limp  No pain with passive range of motion of the right hip.      Xr Hip With Or Without Pelvis 2 - 3 View Right    Result Date: 3/9/2018  Narrative: Xray right hip with standard projection AP and lateral view bone quality is decreased, femoral neck screws well aligned, no significant change femoral head-neck alignment, right hip joint space is well preserved AP pelvis xray hip joint space is well preserved right and left.  Sacroiliac joints are normal.        Assessment:  Diagnoses and all orders for this visit:    Closed subcapital fracture of right femur, sequela          Plan:    Weight bearing as tolerated  Xray right hip.    No Follow-up on file.      03/09/18 at 10:18 AM by Basil Ulrich MD  "

## 2018-06-04 ENCOUNTER — OFFICE VISIT (OUTPATIENT)
Dept: PODIATRY | Facility: CLINIC | Age: 78
End: 2018-06-04

## 2018-06-04 VITALS — WEIGHT: 148 LBS | BODY MASS INDEX: 24.66 KG/M2 | HEIGHT: 65 IN

## 2018-06-04 DIAGNOSIS — M79.674 TOE PAIN, RIGHT: Primary | ICD-10-CM

## 2018-06-04 PROCEDURE — 99213 OFFICE O/P EST LOW 20 MIN: CPT | Performed by: PODIATRIST

## 2018-06-04 NOTE — PROGRESS NOTES
Sara Solis  1940  77 y.o. female     Patient presents today for right great toe pain. Patient states her pain is 2/10 right now.    6/4/2018  Chief Complaint   Patient presents with   • Right Foot - Toe Pain       History of Present Illness    Patient presents to clinic with chief complaint of right great toe pain.  Patient states that approximately 3 days ago she twisted her right foot.  The great toe joint became sharply painful.  Currently she rates as a 2 out of 10.  She states it is progressively getting better.  She has done nothing to treat it.  She denies any other pedal complains.      Past Medical History:   Diagnosis Date   • Actinic keratosis    • Allergic rhinitis    • Altered bowel function    • Anxiety state    • Aptyalism    • Attention deficit hyperactivity disorder    • Bunion    • Cough    • Degenerative joint disease involving multiple joints    • Depressive disorder    • Diarrhea    • Disorder of skin    • Fissure in skin    • Foot pain    • Generalized anxiety disorder    • Hepatitis    • Herpes labialis    • History of bone scan 10/01/2008    DXA BONE DENSITY AXIAL 47695 (2) - Osteopenia of the lumbar spine, which has improved compared to the prior study. Normal bone mineral density of the hips, which has improved compared to the prior study   • History of colonoscopy 04/05/2011    Colon endoscopy 19680 (2) - Diverticulosis found in sigmoid colon. Moderate fixation of sigmoid colon. Internal hemorrhoids found in anus.    • History of esophagogastroduodenoscopy 04/05/2011    EGD w/ tube 49666 (1) - Normal hypopharynx, GE junction, duodenum, symmetrical & patent pylorus. Normal esophagus. Dilatation performed. Chronic gastritis found in antrum. Biopsy taken.   • History of mammogram 10/01/2008    Mammogram, both breasts (1) - Negative mammogram. Recommend follow-up in 12 months;     • Incontinence of feces    • Increased frequency of urination    • Insomnia    • Irritable bowel  syndrome    • Knee pain    • Lipoma of shoulder    • Low back pain    • Lumbosacral radiculopathy    • Nausea    • Nervous system abnormality     Disorder of the peripheral nervous system     • Osteopenia    • Pain in limb    • Pain in lower limb    • Plantar fasciitis    • Pure hypercholesterolemia    • Restless legs    • Skin lesion     nose   • Spasm    • Spasm of back muscles    • Squamous cell carcinoma of skin    • Stress fracture    • Tear film insufficiency    • Trochanteric bursitis of right hip    • Upper respiratory infection    • Urinary tract infectious disease    • Vertigo          Past Surgical History:   Procedure Laterality Date   • BREAST BIOPSY  12/04/1995    BIOPSY OF BREAST OPEN 23777 (2) - Right,Exscision of mass.Fibroadenoma. Fibrocystic disease, proliferative   • CATARACT EXTRACTION WITH INTRAOCULAR LENS IMPLANT Right 03/07/2006    Remove cataract, insert lens (1) - right   • DILATATION AND CURETTAGE  10/02/1986    D&C (1) - Fractional D&C laparoscopic tubal sterilization. Plus uterine fibroids, approximately 10-12 weeks in size   • FOOT SURGERY  03/20/1990    Foot/toes surgery procedure (1) - Left,Excisional biopsy. Tumor, supposed fibroma, 5th toe   • HIP PINNING Right 12/21/2017    Procedure: HIP PINNING                 (C-ARM#3);  Surgeon: Basil Ulrich MD;  Location: [facility] OR;  Service:    • INJECTION OF MEDICATION  06/08/2015    Kenalog (2) - SERENA Robert   • LIVER BIOPSY  04/26/2000    Needle biopsy of liver (1) - percutaneous liver biopsy;     • OTHER SURGICAL HISTORY  10/17/2002    Chest procedure (2) - Intralesional injection of keloid of chest    • OTHER SURGICAL HISTORY  10/25/2001    Remove axillary lymph nodes (1) - Biopsy, Left axillary adenopathy. 4 lymph nodes with reactive hyperplasia No tumor seen   • OTHER SURGICAL HISTORY  06/17/2013    Biopsy, Each Additional Lesion 21755 (1) - SERENA Robert   • OTHER SURGICAL HISTORY  07/14/2014    Destruction of Premalignant Lesion  (1st) 53270 (2)  - SERENA Robert   • OTHER SURGICAL HISTORY  05/10/2016    Drain/Inject Major Joint 68524 (4) - SERENA Robert   • SKIN BIOPSY  07/28/2014    Biopsy of Skin 49389 (2)  - SERENA Robert   • TOTAL ABDOMINAL HYSTERECTOMY WITH SALPINGO OOPHORECTOMY  09/25/1990    incidental appendectomy.Uterine fibroids         Family History   Problem Relation Age of Onset   • Melanoma Other    • Stroke Other    • Osteoporosis Mother    • Cancer Father    • Psoriasis Sister    • Severe sprains Sister    • Diabetes Sister    • Heart disease Maternal Uncle    • Heart disease Maternal Grandmother        No Known Allergies    Social History     Social History   • Marital status:      Spouse name: N/A   • Number of children: N/A   • Years of education: N/A     Occupational History   • Not on file.     Social History Main Topics   • Smoking status: Never Smoker   • Smokeless tobacco: Never Used      Comment: Tobacco no use   • Alcohol use No   • Drug use: No   • Sexual activity: No     Other Topics Concern   • Not on file     Social History Narrative   • No narrative on file         Current Outpatient Prescriptions   Medication Sig Dispense Refill   • acetaminophen (TYLENOL) 325 MG tablet Take 2 tablets by mouth Every 4 (Four) Hours As Needed for Mild Pain , Headache or Fever.     • apixaban (ELIQUIS) 2.5 MG tablet tablet Take 1 tablet by mouth Every 12 (Twelve) Hours. 60 tablet 1   • Calcium Carb-Cholecalciferol (CALCIUM + D3 PO) Take  by mouth Daily.     • cetirizine (zyrTEC) 10 MG tablet Take 1 tablet by mouth Every Night. 30 tablet 11   • cholecalciferol (VITAMIN D3) 1000 UNITS tablet Take 1,000 Units by mouth Daily.     • diazePAM (VALIUM) 5 MG tablet Take 0.5-1 tablets by mouth Every 8 (Eight) Hours As Needed (vertigo). 30 tablet 0   • fluticasone (FLONASE) 50 MCG/ACT nasal spray 2 sprays into each nostril Daily. 1 bottle 11   • gabapentin (NEURONTIN) 600 MG tablet 1/2 to one tablet tid prn. 270 tablet 1   • lactulose (CHRONULAC) 10  "GM/15ML solution Take 15 mL by mouth 3 (Three) Times a Day. 946 mL 1   • Magnesium 100 MG capsule Take 400 mg by mouth Daily.     • Multiple Vitamins-Minerals (CENTRUM SILVER PO) Take  by mouth Daily.     • oxyCODONE-acetaminophen (PERCOCET) 5-325 MG per tablet Take 1-2 tablets by mouth Every 6 (Six) Hours As Needed for Moderate Pain . 40 tablet 0   • pramipexole (MIRAPEX) 0.5 MG tablet Take 2 tablets by mouth Every Night. 180 tablet 3   • valACYclovir (VALTREX) 1000 MG tablet Take 2 tablets by mouth 2 (Two) Times a Day. 4 tablet 11     No current facility-administered medications for this visit.          OBJECTIVE    Ht 165.1 cm (65\")   Wt 67.1 kg (148 lb)   BMI 24.63 kg/m²       Review of Systems   Constitutional: Negative.    Respiratory: Negative.    Cardiovascular: Negative.    Gastrointestinal: Negative.    Genitourinary: Negative.    Musculoskeletal: Positive for back pain.        Right foot pain   Skin: Negative.    Allergic/Immunologic: Negative.    Neurological: Positive for dizziness and headaches.   Psychiatric/Behavioral: Negative.          Constitutional: well developed, well nourished    HEENT: Normocephalic and atraumatic, normal hearing    Respiratory: Non labored respirations noted    Cardiovascular:    DP/PT pulses palpable    CFT brisk  to all digits  Skin temp is warm to warm from proximal tibia to distal digits  Pedal hair growth present.   No erythema  noted     Musculoskeletal:  Muscle strength is 5/5 for all muscle groups tested   ROM of the 1st MTP is full without pain or crepitus  ROM of the MTJ is full with pain    ROM of the STJ is full without pain or crepitus    ROM of the ankle joint is full without pain or crepitus    Very mild pain on palpation to the plantar right foot sesamoid complex    Dermatological:   Skin is warm, dry and intact    Webspaces 1-4 bilateral are clean, dry and intact.   No subcutaneous nodules or masses noted    No open wounds noted     Neurological: "   Protective sensation intact    Sensation intact to light touch    DTR intact    Psychiatric: A&O x 3 with normal mood and affect. NAD.     Radiographs: 3 views right foot were obtained today.  No acute osseous abnormalities are noted.        Procedures        ASSESSMENT AND PLAN    Sara was seen today for toe pain.    Diagnoses and all orders for this visit:    Toe pain, right  -     XR Foot 3+ View Right      - X-rays taken and reviewed  - Recommended ice and anti-inflammatories  - Avoid strenuous activity for the next week  - All questions were answered to the patients satisfaction.  - RTC 4 weeks if symptoms worsen or fail to improve            This document has been electronically signed by Vincenzo Holly DPM on June 4, 2018 12:40 PM     6/4/2018  12:40 PM

## 2018-06-08 DIAGNOSIS — S72.011S: Primary | ICD-10-CM

## 2018-07-19 ENCOUNTER — OFFICE VISIT (OUTPATIENT)
Dept: FAMILY MEDICINE CLINIC | Facility: CLINIC | Age: 78
End: 2018-07-19

## 2018-07-19 VITALS
OXYGEN SATURATION: 96 % | HEIGHT: 65 IN | HEART RATE: 86 BPM | BODY MASS INDEX: 26.16 KG/M2 | WEIGHT: 157 LBS | DIASTOLIC BLOOD PRESSURE: 68 MMHG | SYSTOLIC BLOOD PRESSURE: 128 MMHG | TEMPERATURE: 98.1 F

## 2018-07-19 DIAGNOSIS — G89.29 CHRONIC RIGHT-SIDED LOW BACK PAIN WITH RIGHT-SIDED SCIATICA: Primary | ICD-10-CM

## 2018-07-19 DIAGNOSIS — M54.41 CHRONIC RIGHT-SIDED LOW BACK PAIN WITH RIGHT-SIDED SCIATICA: Primary | ICD-10-CM

## 2018-07-19 PROCEDURE — 99213 OFFICE O/P EST LOW 20 MIN: CPT | Performed by: FAMILY MEDICINE

## 2018-07-19 RX ORDER — OXYCODONE HYDROCHLORIDE AND ACETAMINOPHEN 5; 325 MG/1; MG/1
1-2 TABLET ORAL EVERY 4 HOURS PRN
Qty: 24 TABLET | Refills: 0 | Status: SHIPPED | OUTPATIENT
Start: 2018-07-19 | End: 2020-01-16

## 2018-07-19 NOTE — PROGRESS NOTES
" Subjective   Sara Solis is a 77 y.o. female.     History of Present Illness     Fell 7/8/18 and low back hurts.  Was going to call dr maravilla and ask him but didn't want to have to drive there to  a rx?  Wants percocet.   She is sore all over.    Review of Systems   Constitutional: Negative for chills, fatigue and fever.   HENT: Negative for congestion, ear discharge, ear pain, facial swelling, hearing loss, postnasal drip, rhinorrhea, sinus pressure, sore throat, trouble swallowing and voice change.    Eyes: Negative for discharge, redness and visual disturbance.   Respiratory: Negative for cough, chest tightness, shortness of breath and wheezing.    Cardiovascular: Negative for chest pain and palpitations.   Gastrointestinal: Negative for abdominal pain, blood in stool, constipation, diarrhea, nausea and vomiting.   Endocrine: Negative for polydipsia and polyuria.   Genitourinary: Negative for dysuria, flank pain, hematuria and urgency.   Musculoskeletal: Positive for back pain. Negative for arthralgias, joint swelling and myalgias.   Skin: Negative for rash.   Neurological: Negative for dizziness, weakness, numbness and headaches.   Hematological: Negative for adenopathy.   Psychiatric/Behavioral: Negative for confusion and sleep disturbance. The patient is not nervous/anxious.            /68 (BP Location: Left arm, Patient Position: Sitting, Cuff Size: Adult)   Pulse 86   Temp 98.1 °F (36.7 °C) (Temporal Artery )   Ht 165.1 cm (65\")   Wt 71.2 kg (157 lb)   SpO2 96%   BMI 26.13 kg/m²       Objective     Physical Exam   Constitutional: She is oriented to person, place, and time. She appears well-developed and well-nourished.   HENT:   Head: Normocephalic and atraumatic.   Right Ear: External ear normal.   Left Ear: External ear normal.   Nose: Nose normal.   Eyes: Pupils are equal, round, and reactive to light. Conjunctivae and EOM are normal.   Neck: Normal range of motion. "   Pulmonary/Chest: Effort normal.   Musculoskeletal: Normal range of motion.   Neurological: She is alert and oriented to person, place, and time.   Psychiatric: She has a normal mood and affect. Her behavior is normal. Judgment and thought content normal.   Nursing note and vitals reviewed.          PAST MEDICAL HISTORY     Past Medical History:   Diagnosis Date   • Actinic keratosis    • Allergic rhinitis    • Altered bowel function    • Anxiety state    • Aptyalism    • Attention deficit hyperactivity disorder    • Bunion    • Cough    • Degenerative joint disease involving multiple joints    • Depressive disorder    • Diarrhea    • Disorder of skin    • Fissure in skin    • Foot pain    • Generalized anxiety disorder    • Hepatitis    • Herpes labialis    • History of bone scan 10/01/2008    DXA BONE DENSITY AXIAL 71179 (2) - Osteopenia of the lumbar spine, which has improved compared to the prior study. Normal bone mineral density of the hips, which has improved compared to the prior study   • History of colonoscopy 04/05/2011    Colon endoscopy 48442 (2) - Diverticulosis found in sigmoid colon. Moderate fixation of sigmoid colon. Internal hemorrhoids found in anus.    • History of esophagogastroduodenoscopy 04/05/2011    EGD w/ tube 01833 (1) - Normal hypopharynx, GE junction, duodenum, symmetrical & patent pylorus. Normal esophagus. Dilatation performed. Chronic gastritis found in antrum. Biopsy taken.   • History of mammogram 10/01/2008    Mammogram, both breasts (1) - Negative mammogram. Recommend follow-up in 12 months;     • Incontinence of feces    • Increased frequency of urination    • Insomnia    • Irritable bowel syndrome    • Knee pain    • Lipoma of shoulder    • Low back pain    • Lumbosacral radiculopathy    • Nausea    • Nervous system abnormality     Disorder of the peripheral nervous system     • Osteopenia    • Pain in limb    • Pain in lower limb    • Plantar fasciitis    • Pure  hypercholesterolemia    • Restless legs    • Skin lesion     nose   • Spasm    • Spasm of back muscles    • Squamous cell carcinoma of skin    • Stress fracture    • Tear film insufficiency    • Trochanteric bursitis of right hip    • Upper respiratory infection    • Urinary tract infectious disease    • Vertigo       PAST SURGICAL HISTORY     Past Surgical History:   Procedure Laterality Date   • BREAST BIOPSY  12/04/1995    BIOPSY OF BREAST OPEN 95019 (2) - Right,Exscision of mass.Fibroadenoma. Fibrocystic disease, proliferative   • CATARACT EXTRACTION WITH INTRAOCULAR LENS IMPLANT Right 03/07/2006    Remove cataract, insert lens (1) - right   • DILATATION AND CURETTAGE  10/02/1986    D&C (1) - Fractional D&C laparoscopic tubal sterilization. Plus uterine fibroids, approximately 10-12 weeks in size   • FOOT SURGERY  03/20/1990    Foot/toes surgery procedure (1) - Left,Excisional biopsy. Tumor, supposed fibroma, 5th toe   • HIP PINNING Right 12/21/2017    Procedure: HIP PINNING                 (C-ARM#3);  Surgeon: Basil Ulrich MD;  Location: [facility];  Service:    • INJECTION OF MEDICATION  06/08/2015    Kenalog (2) - SERENA Robert   • LIVER BIOPSY  04/26/2000    Needle biopsy of liver (1) - percutaneous liver biopsy;     • OTHER SURGICAL HISTORY  10/17/2002    Chest procedure (2) - Intralesional injection of keloid of chest    • OTHER SURGICAL HISTORY  10/25/2001    Remove axillary lymph nodes (1) - Biopsy, Left axillary adenopathy. 4 lymph nodes with reactive hyperplasia No tumor seen   • OTHER SURGICAL HISTORY  06/17/2013    Biopsy, Each Additional Lesion 39804 (1) - SERENA Robert   • OTHER SURGICAL HISTORY  07/14/2014    Destruction of Premalignant Lesion (1st) 11827 (2)  - SERENA Robert   • OTHER SURGICAL HISTORY  05/10/2016    Drain/Inject Major Joint 31508 (4) - SERENA Robert   • SKIN BIOPSY  07/28/2014    Biopsy of Skin 54085 (2)  - SERENA Robert   • TOTAL ABDOMINAL HYSTERECTOMY WITH SALPINGO OOPHORECTOMY  09/25/1990     incidental appendectomy.Uterine fibroids      SOCIAL HISTORY     Social History     Social History   • Marital status:      Social History Main Topics   • Smoking status: Never Smoker   • Smokeless tobacco: Never Used      Comment: Tobacco no use   • Alcohol use No   • Drug use: No   • Sexual activity: No     Other Topics Concern   • Not on file      ALLERGIES   Patient has no known allergies.   MEDICATIONS     Current Outpatient Prescriptions   Medication Sig Dispense Refill   • acetaminophen (TYLENOL) 325 MG tablet Take 2 tablets by mouth Every 4 (Four) Hours As Needed for Mild Pain , Headache or Fever.     • Calcium Carb-Cholecalciferol (CALCIUM + D3 PO) Take  by mouth Daily.     • cetirizine (zyrTEC) 10 MG tablet Take 1 tablet by mouth Every Night. 30 tablet 11   • cholecalciferol (VITAMIN D3) 1000 UNITS tablet Take 1,000 Units by mouth Daily.     • diazePAM (VALIUM) 5 MG tablet Take 0.5-1 tablets by mouth Every 8 (Eight) Hours As Needed (vertigo). 30 tablet 0   • fluticasone (FLONASE) 50 MCG/ACT nasal spray 2 sprays into each nostril Daily. 1 bottle 11   • gabapentin (NEURONTIN) 600 MG tablet 1/2 to one tablet tid prn. 270 tablet 1   • Magnesium 100 MG capsule Take 400 mg by mouth Daily.     • Multiple Vitamins-Minerals (CENTRUM SILVER PO) Take  by mouth Daily.     • pramipexole (MIRAPEX) 0.5 MG tablet Take 2 tablets by mouth Every Night. 180 tablet 3   • oxyCODONE-acetaminophen (PERCOCET) 5-325 MG per tablet Take 1-2 tablets by mouth Every 4 (Four) Hours As Needed for Moderate Pain . 24 tablet 0   • valACYclovir (VALTREX) 1000 MG tablet Take 2 tablets by mouth 2 (Two) Times a Day. 4 tablet 11     No current facility-administered medications for this visit.         The following portions of the patient's history were reviewed and updated as appropriate: allergies, current medications, past family history, past medical history, past social history, past surgical history and problem  list.        Assessment/Plan   Sara was seen today for med refill and back pain.    Diagnoses and all orders for this visit:    Chronic right-sided low back pain with right-sided sciatica    Other orders  -     oxyCODONE-acetaminophen (PERCOCET) 5-325 MG per tablet; Take 1-2 tablets by mouth Every 4 (Four) Hours As Needed for Moderate Pain .      Told to use less than above schedule of meds, limited to 48 hour supply.     hi [id number] ok             No Follow-up on file.                  This document has been electronically signed by Sulaiman Robert MD on July 19, 2018 12:27 PM

## 2018-07-23 RX ORDER — GABAPENTIN 600 MG/1
TABLET ORAL
Qty: 270 TABLET | Refills: 1 | Status: SHIPPED | OUTPATIENT
Start: 2018-07-23 | End: 2019-01-16 | Stop reason: SDUPTHER

## 2018-07-23 RX ORDER — GABAPENTIN 600 MG/1
TABLET ORAL
Qty: 270 TABLET | Refills: 1 | Status: SHIPPED | OUTPATIENT
Start: 2018-07-23 | End: 2018-07-23 | Stop reason: SDUPTHER

## 2018-07-23 RX ORDER — DIAZEPAM 5 MG/1
2.5-5 TABLET ORAL EVERY 8 HOURS PRN
Qty: 30 TABLET | Refills: 0 | Status: SHIPPED | OUTPATIENT
Start: 2018-07-23 | End: 2018-11-02

## 2018-07-23 RX ORDER — DIAZEPAM 5 MG/1
2.5-5 TABLET ORAL EVERY 8 HOURS PRN
Qty: 30 TABLET | Refills: 0 | Status: SHIPPED | OUTPATIENT
Start: 2018-07-23 | End: 2018-07-23 | Stop reason: SDUPTHER

## 2018-08-23 RX ORDER — PRAMIPEXOLE DIHYDROCHLORIDE 0.5 MG/1
1 TABLET ORAL NIGHTLY
Qty: 180 TABLET | Refills: 3 | Status: SHIPPED | OUTPATIENT
Start: 2018-08-23 | End: 2019-08-19 | Stop reason: SDUPTHER

## 2018-09-10 ENCOUNTER — TELEPHONE (OUTPATIENT)
Dept: FAMILY MEDICINE CLINIC | Facility: CLINIC | Age: 78
End: 2018-09-10

## 2018-09-10 RX ORDER — PROMETHAZINE HYDROCHLORIDE 25 MG/1
25 TABLET ORAL EVERY 6 HOURS PRN
Qty: 30 TABLET | Refills: 0 | Status: SHIPPED | OUTPATIENT
Start: 2018-09-10 | End: 2020-01-16 | Stop reason: SDUPTHER

## 2018-10-10 ENCOUNTER — OFFICE VISIT (OUTPATIENT)
Dept: FAMILY MEDICINE CLINIC | Facility: CLINIC | Age: 78
End: 2018-10-10

## 2018-10-10 VITALS
OXYGEN SATURATION: 97 % | DIASTOLIC BLOOD PRESSURE: 84 MMHG | SYSTOLIC BLOOD PRESSURE: 150 MMHG | HEART RATE: 90 BPM | TEMPERATURE: 97.2 F | WEIGHT: 159.4 LBS | BODY MASS INDEX: 26.56 KG/M2 | HEIGHT: 65 IN

## 2018-10-10 DIAGNOSIS — H69.83 DYSFUNCTION OF BOTH EUSTACHIAN TUBES: Primary | ICD-10-CM

## 2018-10-10 PROCEDURE — 99214 OFFICE O/P EST MOD 30 MIN: CPT | Performed by: FAMILY MEDICINE

## 2018-10-10 RX ORDER — FLUTICASONE PROPIONATE 50 MCG
2 SPRAY, SUSPENSION (ML) NASAL DAILY
Qty: 1 BOTTLE | Refills: 11 | Status: SHIPPED | OUTPATIENT
Start: 2018-10-10 | End: 2019-05-31

## 2018-10-10 RX ORDER — METHYLPREDNISOLONE 4 MG/1
TABLET ORAL
Qty: 21 TABLET | Refills: 0 | Status: SHIPPED | OUTPATIENT
Start: 2018-10-10 | End: 2018-11-02

## 2018-10-10 RX ORDER — LORATADINE 10 MG/1
10 TABLET ORAL DAILY
Qty: 30 TABLET | Refills: 11 | Status: SHIPPED | OUTPATIENT
Start: 2018-10-10 | End: 2018-11-02

## 2018-10-10 NOTE — PROGRESS NOTES
" Subjective   Sara Solis is a 77 y.o. female.     History of Present Illness     Ear fullness couple of weeks.  No dizziness    Review of Systems   Constitutional: Negative for chills, fatigue and fever.   HENT: Negative for congestion, ear discharge, ear pain, facial swelling, hearing loss, postnasal drip, rhinorrhea, sinus pressure, sore throat, trouble swallowing and voice change.    Eyes: Negative for discharge, redness and visual disturbance.   Respiratory: Negative for cough, chest tightness, shortness of breath and wheezing.    Cardiovascular: Negative for chest pain and palpitations.   Gastrointestinal: Negative for abdominal pain, blood in stool, constipation, diarrhea, nausea and vomiting.   Endocrine: Negative for polydipsia and polyuria.   Genitourinary: Negative for dysuria, flank pain, hematuria and urgency.   Musculoskeletal: Negative for arthralgias, back pain, joint swelling and myalgias.   Skin: Negative for rash.   Neurological: Negative for dizziness, weakness, numbness and headaches.   Hematological: Negative for adenopathy.   Psychiatric/Behavioral: Negative for confusion and sleep disturbance. The patient is not nervous/anxious.            /84 (BP Location: Left arm, Patient Position: Sitting, Cuff Size: Adult)   Pulse 90   Temp 97.2 °F (36.2 °C) (Temporal Artery )   Ht 165.1 cm (65\")   Wt 72.3 kg (159 lb 6.4 oz)   SpO2 97%   BMI 26.53 kg/m²       Objective     Physical Exam   Constitutional: She is oriented to person, place, and time. She appears well-developed and well-nourished.   HENT:   Head: Normocephalic and atraumatic.   Right Ear: External ear normal.   Left Ear: External ear normal.   Nose: Nose normal.   Tm's normal and opaque  Ears do not pop with valsalva   Eyes: Pupils are equal, round, and reactive to light. Conjunctivae and EOM are normal.   Neck: Normal range of motion.   Pulmonary/Chest: Effort normal.   Musculoskeletal: Normal range of motion. "   Neurological: She is alert and oriented to person, place, and time.   Psychiatric: She has a normal mood and affect. Her behavior is normal. Judgment and thought content normal.   Nursing note and vitals reviewed.          PAST MEDICAL HISTORY     Past Medical History:   Diagnosis Date   • Actinic keratosis    • Allergic rhinitis    • Altered bowel function    • Anxiety state    • Aptyalism    • Attention deficit hyperactivity disorder    • Bunion    • Cough    • Degenerative joint disease involving multiple joints    • Depressive disorder    • Diarrhea    • Disorder of skin    • Fissure in skin    • Foot pain    • Generalized anxiety disorder    • Hepatitis    • Herpes labialis    • History of bone scan 10/01/2008    DXA BONE DENSITY AXIAL 16399 (2) - Osteopenia of the lumbar spine, which has improved compared to the prior study. Normal bone mineral density of the hips, which has improved compared to the prior study   • History of colonoscopy 04/05/2011    Colon endoscopy 23688 (2) - Diverticulosis found in sigmoid colon. Moderate fixation of sigmoid colon. Internal hemorrhoids found in anus.    • History of esophagogastroduodenoscopy 04/05/2011    EGD w/ tube 29597 (1) - Normal hypopharynx, GE junction, duodenum, symmetrical & patent pylorus. Normal esophagus. Dilatation performed. Chronic gastritis found in antrum. Biopsy taken.   • History of mammogram 10/01/2008    Mammogram, both breasts (1) - Negative mammogram. Recommend follow-up in 12 months;     • Incontinence of feces    • Increased frequency of urination    • Insomnia    • Irritable bowel syndrome    • Knee pain    • Lipoma of shoulder    • Low back pain    • Lumbosacral radiculopathy    • Nausea    • Nervous system abnormality     Disorder of the peripheral nervous system     • Osteopenia    • Pain in limb    • Pain in lower limb    • Plantar fasciitis    • Pure hypercholesterolemia    • Restless legs    • Skin lesion     nose   • Spasm    • Spasm of  back muscles    • Squamous cell carcinoma of skin    • Stress fracture    • Tear film insufficiency    • Trochanteric bursitis of right hip    • Upper respiratory infection    • Urinary tract infectious disease    • Vertigo       PAST SURGICAL HISTORY     Past Surgical History:   Procedure Laterality Date   • BREAST BIOPSY  12/04/1995    BIOPSY OF BREAST OPEN 71875 (2) - Right,Exscision of mass.Fibroadenoma. Fibrocystic disease, proliferative   • CATARACT EXTRACTION WITH INTRAOCULAR LENS IMPLANT Right 03/07/2006    Remove cataract, insert lens (1) - right   • DILATATION AND CURETTAGE  10/02/1986    D&C (1) - Fractional D&C laparoscopic tubal sterilization. Plus uterine fibroids, approximately 10-12 weeks in size   • FOOT SURGERY  03/20/1990    Foot/toes surgery procedure (1) - Left,Excisional biopsy. Tumor, supposed fibroma, 5th toe   • HIP PINNING Right 12/21/2017    Procedure: HIP PINNING                 (C-ARM#3);  Surgeon: Basil Ulrich MD;  Location: [facility];  Service:    • INJECTION OF MEDICATION  06/08/2015    Susana (2) - SERENA Robert   • LIVER BIOPSY  04/26/2000    Needle biopsy of liver (1) - percutaneous liver biopsy;     • OTHER SURGICAL HISTORY  10/17/2002    Chest procedure (2) - Intralesional injection of keloid of chest    • OTHER SURGICAL HISTORY  10/25/2001    Remove axillary lymph nodes (1) - Biopsy, Left axillary adenopathy. 4 lymph nodes with reactive hyperplasia No tumor seen   • OTHER SURGICAL HISTORY  06/17/2013    Biopsy, Each Additional Lesion 55858 (1) - SERENA Robert   • OTHER SURGICAL HISTORY  07/14/2014    Destruction of Premalignant Lesion (1st) 98156 (2)  - SERENA Robert   • OTHER SURGICAL HISTORY  05/10/2016    Drain/Inject Major Joint 16078 (4) - SERENA Robert   • SKIN BIOPSY  07/28/2014    Biopsy of Skin 08724 (2)  - SERENA Robert   • TOTAL ABDOMINAL HYSTERECTOMY WITH SALPINGO OOPHORECTOMY  09/25/1990    incidental appendectomy.Uterine fibroids      SOCIAL HISTORY     Social History     Social  History   • Marital status:      Social History Main Topics   • Smoking status: Never Smoker   • Smokeless tobacco: Never Used      Comment: Tobacco no use   • Alcohol use No   • Drug use: No   • Sexual activity: No     Other Topics Concern   • Not on file      ALLERGIES   Patient has no known allergies.   MEDICATIONS     Current Outpatient Prescriptions   Medication Sig Dispense Refill   • acetaminophen (TYLENOL) 325 MG tablet Take 2 tablets by mouth Every 4 (Four) Hours As Needed for Mild Pain , Headache or Fever.     • Calcium Carb-Cholecalciferol (CALCIUM + D3 PO) Take  by mouth Daily.     • cetirizine (zyrTEC) 10 MG tablet Take 1 tablet by mouth Every Night. 30 tablet 11   • cholecalciferol (VITAMIN D3) 1000 UNITS tablet Take 1,000 Units by mouth Daily.     • diazePAM (VALIUM) 5 MG tablet Take 0.5-1 tablets by mouth Every 8 (Eight) Hours As Needed (vertigo). 30 tablet 0   • fluticasone (FLONASE) 50 MCG/ACT nasal spray 2 sprays into the nostril(s) as directed by provider Daily. 1 bottle 11   • gabapentin (NEURONTIN) 600 MG tablet 1/2 to one tablet tid prn. 270 tablet 1   • Magnesium 100 MG capsule Take 400 mg by mouth Daily.     • Multiple Vitamins-Minerals (CENTRUM SILVER PO) Take  by mouth Daily.     • oxyCODONE-acetaminophen (PERCOCET) 5-325 MG per tablet Take 1-2 tablets by mouth Every 4 (Four) Hours As Needed for Moderate Pain . 24 tablet 0   • pramipexole (MIRAPEX) 0.5 MG tablet Take 2 tablets by mouth Every Night. 180 tablet 3   • promethazine (PHENERGAN) 25 MG tablet Take 1 tablet by mouth Every 6 (Six) Hours As Needed for Nausea or Vomiting. 30 tablet 0   • valACYclovir (VALTREX) 1000 MG tablet Take 2 tablets by mouth 2 (Two) Times a Day. 4 tablet 11   • loratadine (CLARITIN) 10 MG tablet Take 1 tablet by mouth Daily. 30 tablet 11   • MethylPREDNISolone (MEDROL, DELIA,) 4 MG tablet Take as directed on package instructions. 21 tablet 0     No current facility-administered medications for this visit.          The following portions of the patient's history were reviewed and updated as appropriate: allergies, current medications, past family history, past medical history, past social history, past surgical history and problem list.        Assessment/Plan   Sara was seen today for dizziness.    Diagnoses and all orders for this visit:    Dysfunction of both eustachian tubes    Other orders  -     loratadine (CLARITIN) 10 MG tablet; Take 1 tablet by mouth Daily.  -     MethylPREDNISolone (MEDROL, DELIA,) 4 MG tablet; Take as directed on package instructions.  -     fluticasone (FLONASE) 50 MCG/ACT nasal spray; 2 sprays into the nostril(s) as directed by provider Daily.      Explained eustachian tube dysfunction.  Continue valsalva.  May take weeks.                  No Follow-up on file.                  This document has been electronically signed by Sulaiman Robert MD on October 10, 2018 5:15 PM

## 2018-10-15 DIAGNOSIS — B00.1 FEVER BLISTER: ICD-10-CM

## 2018-10-15 RX ORDER — VALACYCLOVIR HYDROCHLORIDE 1 G/1
1000 TABLET, FILM COATED ORAL TAKE AS DIRECTED
Qty: 30 TABLET | Refills: 11 | Status: SHIPPED | OUTPATIENT
Start: 2018-10-15 | End: 2021-04-06 | Stop reason: SDUPTHER

## 2018-10-16 ENCOUNTER — TELEPHONE (OUTPATIENT)
Dept: FAMILY MEDICINE CLINIC | Facility: CLINIC | Age: 78
End: 2018-10-16

## 2018-10-16 DIAGNOSIS — H69.83 EUSTACHIAN TUBE DYSFUNCTION, BILATERAL: Primary | ICD-10-CM

## 2018-10-24 ENCOUNTER — TELEPHONE (OUTPATIENT)
Dept: FAMILY MEDICINE CLINIC | Facility: CLINIC | Age: 78
End: 2018-10-24

## 2018-10-24 NOTE — TELEPHONE ENCOUNTER
PATIENT HAD BEEN TAKING VALTREX 1000 MG 2 BID.  THE REFILL YOU GAVE HER ON 10/15/2018 WAS FOR 1000 MG 1 QD.  HOW DO YOU WANT HER TO TAKE IT?  RASHEED

## 2018-11-02 ENCOUNTER — OFFICE VISIT (OUTPATIENT)
Dept: OTOLARYNGOLOGY | Facility: CLINIC | Age: 78
End: 2018-11-02

## 2018-11-02 ENCOUNTER — CLINICAL SUPPORT (OUTPATIENT)
Dept: AUDIOLOGY | Facility: CLINIC | Age: 78
End: 2018-11-02

## 2018-11-02 VITALS — WEIGHT: 161 LBS | TEMPERATURE: 96.9 F | HEIGHT: 65 IN | BODY MASS INDEX: 26.82 KG/M2

## 2018-11-02 DIAGNOSIS — H90.3 SENSORINEURAL HEARING LOSS (SNHL) OF BOTH EARS: ICD-10-CM

## 2018-11-02 DIAGNOSIS — R42 DIZZINESS: ICD-10-CM

## 2018-11-02 DIAGNOSIS — H90.3 SENSORINEURAL HEARING LOSS, BILATERAL: Primary | ICD-10-CM

## 2018-11-02 DIAGNOSIS — M26.623 BILATERAL TEMPOROMANDIBULAR JOINT PAIN: Primary | ICD-10-CM

## 2018-11-02 DIAGNOSIS — H81.11 BENIGN PAROXYSMAL POSITIONAL VERTIGO OF RIGHT EAR: ICD-10-CM

## 2018-11-02 PROCEDURE — 99203 OFFICE O/P NEW LOW 30 MIN: CPT | Performed by: OTOLARYNGOLOGY

## 2018-11-02 RX ORDER — MELOXICAM 15 MG/1
15 TABLET ORAL DAILY
Qty: 21 TABLET | Refills: 0 | Status: SHIPPED | OUTPATIENT
Start: 2018-11-02 | End: 2019-03-28

## 2018-11-02 NOTE — PROGRESS NOTES
Subjective   Sara Solis is a 77 y.o. female.     History of Present Illness   Pressure with ears , no otorhea, no hearing changes  Patient notes she grits her grinds her teeth.  She does not gum chewing she's not having ear drainage no change in hearing has a long-standing history of dizziness which is treated by Dr. Maravilla with therapy she notes some balance problems she turns to the right    The following portions of the patient's history were reviewed and updated as appropriate: allergies, current medications, past family history, past medical history, past social history, past surgical history and problem list.      Sara Solis reports that she has never smoked. She has never used smokeless tobacco. She reports that she does not drink alcohol or use drugs.  Patient is not a tobacco user and has not been counseled for use of tobacco products    Family History   Problem Relation Age of Onset   • Melanoma Other    • Stroke Other    • Osteoporosis Mother    • Cancer Father    • Psoriasis Sister    • Severe sprains Sister    • Diabetes Sister    • Heart disease Maternal Uncle    • Heart disease Maternal Grandmother          Current Outpatient Prescriptions:   •  acetaminophen (TYLENOL) 325 MG tablet, Take 2 tablets by mouth Every 4 (Four) Hours As Needed for Mild Pain , Headache or Fever., Disp: , Rfl:   •  cetirizine (zyrTEC) 10 MG tablet, Take 1 tablet by mouth Every Night., Disp: 30 tablet, Rfl: 11  •  cholecalciferol (VITAMIN D3) 1000 UNITS tablet, Take 1,000 Units by mouth Daily., Disp: , Rfl:   •  fluticasone (FLONASE) 50 MCG/ACT nasal spray, 2 sprays into the nostril(s) as directed by provider Daily., Disp: 1 bottle, Rfl: 11  •  gabapentin (NEURONTIN) 600 MG tablet, 1/2 to one tablet tid prn., Disp: 270 tablet, Rfl: 1  •  Magnesium 100 MG capsule, Take 400 mg by mouth Daily., Disp: , Rfl:   •  Multiple Vitamins-Minerals (CENTRUM SILVER PO), Take  by mouth Daily., Disp: , Rfl:   •   oxyCODONE-acetaminophen (PERCOCET) 5-325 MG per tablet, Take 1-2 tablets by mouth Every 4 (Four) Hours As Needed for Moderate Pain ., Disp: 24 tablet, Rfl: 0  •  pramipexole (MIRAPEX) 0.5 MG tablet, Take 2 tablets by mouth Every Night., Disp: 180 tablet, Rfl: 3  •  promethazine (PHENERGAN) 25 MG tablet, Take 1 tablet by mouth Every 6 (Six) Hours As Needed for Nausea or Vomiting., Disp: 30 tablet, Rfl: 0  •  valACYclovir (VALTREX) 1000 MG tablet, Take 1 tablet by mouth Take As Directed., Disp: 30 tablet, Rfl: 11  •  meloxicam (MOBIC) 15 MG tablet, Take 1 tablet by mouth Daily., Disp: 21 tablet, Rfl: 0    No Known Allergies    Past Medical History:   Diagnosis Date   • Actinic keratosis    • Allergic rhinitis    • Altered bowel function    • Anxiety state    • Aptyalism    • Attention deficit hyperactivity disorder    • Bunion    • Cough    • Degenerative joint disease involving multiple joints    • Depressive disorder    • Diarrhea    • Disorder of skin    • Fissure in skin    • Foot pain    • Generalized anxiety disorder    • Hepatitis    • Hepatitis C    • Herpes labialis    • History of bone scan 10/01/2008    DXA BONE DENSITY AXIAL 49849 (2) - Osteopenia of the lumbar spine, which has improved compared to the prior study. Normal bone mineral density of the hips, which has improved compared to the prior study   • History of colonoscopy 04/05/2011    Colon endoscopy 32118 (2) - Diverticulosis found in sigmoid colon. Moderate fixation of sigmoid colon. Internal hemorrhoids found in anus.    • History of esophagogastroduodenoscopy 04/05/2011    EGD w/ tube 37515 (1) - Normal hypopharynx, GE junction, duodenum, symmetrical & patent pylorus. Normal esophagus. Dilatation performed. Chronic gastritis found in antrum. Biopsy taken.   • History of mammogram 10/01/2008    Mammogram, both breasts (1) - Negative mammogram. Recommend follow-up in 12 months;     • Incontinence of feces    • Increased frequency of urination    •  Insomnia    • Irritable bowel syndrome    • Knee pain    • Lipoma of shoulder    • Low back pain    • Lumbosacral radiculopathy    • Nausea    • Nervous system abnormality     Disorder of the peripheral nervous system     • Osteopenia    • Pain in limb    • Pain in lower limb    • Plantar fasciitis    • Pure hypercholesterolemia    • Restless legs    • Skin lesion     nose   • Spasm    • Spasm of back muscles    • Squamous cell carcinoma of skin    • Stress fracture    • Tear film insufficiency    • Trochanteric bursitis of right hip    • Upper respiratory infection    • Urinary tract infectious disease    • Vertigo          Review of Systems   Constitutional: Negative.    Eyes: Positive for itching.   Respiratory: Negative.    Cardiovascular: Negative.    Gastrointestinal: Negative.    Endocrine: Positive for cold intolerance.   Genitourinary: Negative.    Musculoskeletal: Positive for back pain.   Skin: Negative.    Allergic/Immunologic: Negative.    Neurological: Positive for dizziness and headaches.   Hematological: Negative.    Psychiatric/Behavioral: Negative.    All other systems reviewed and are negative.          Objective   Physical Exam   Constitutional: She appears well-developed and well-nourished.   HENT:   Head: Normocephalic.   Right Ear: Tympanic membrane, external ear and ear canal normal.   Left Ear: Tympanic membrane, external ear and ear canal normal.   Nose: Nose normal.   Mouth/Throat: Uvula is midline, oropharynx is clear and moist and mucous membranes are normal.   Eyes: Conjunctivae and EOM are normal.   Neck: Normal range of motion.   Pulmonary/Chest: Effort normal.   Neurological: She is alert.   Skin: Skin is warm.   Psychiatric: She has a normal mood and affect.       Audiogram reveals bilateral high-frequency sensorineural hearing loss and normal tympanograms results reviewed with patient      Assessment/Plan   Sara was seen today for dizziness and ear problem.    Diagnoses and all  orders for this visit:    Bilateral temporomandibular joint pain  -     Ambulatory Referral to Physical Therapy Vestibular    Dizziness  -     Ambulatory Referral to Physical Therapy Vestibular    Benign paroxysmal positional vertigo of right ear  -     Ambulatory Referral to Physical Therapy Vestibular    Other orders  -     meloxicam (MOBIC) 15 MG tablet; Take 1 tablet by mouth Daily.        r pain and around her ears more the jaw and suggest she see a dentist been diet bite block is likely  represents TMJ  Have some symptoms consistent PPV and responded well to therapy past we'll try rearrange that did give her some mobility to help with her discomfort she's call for questions explains use and side effects and we'll see her back next few weeks to make sure she is improving she is to call if worsening in the meantime suggested consideration hearing aid eventually improve hearing protection

## 2018-11-02 NOTE — PATIENT INSTRUCTIONS

## 2018-11-02 NOTE — PROGRESS NOTES
STANDARD AUDIOMETRIC EVALUATION      Name:  Sara Solis  :  1940  Age:  77 y.o.  Date of Evaluation:  2018      HISTORY    Reason for visit:  Sara Solis is seen today for a hearing evaluation at the request of Dr. Jimenez Ríos.  Patient reports that she's had dizziness for the past 3-4 weeks.  She reports that she had dizzy spells in the past and that Dr. Maravilla sent her to therapy.  She reports that her ears feel stopped up.  She reports that she is unsure how she is hearing.      EVALUATION    See Audiogram    RESULTS        Otoscopy and Tympanometry 226 Hz :  Right Ear:  Otoscopy:  Clear ear canal          Tympanometry:  Middle ear function within normal limits    Left Ear:   Otoscopy:  Clear ear canal        Tympanometry:  Middle ear function within normal limits    Test technique:  Standard Audiometry     Pure Tone Audiometry:   Patient responded to pure tones at 15-75 dB for 250-8000 Hz in right ear, and at 10-80 dB for 250-8000 Hz in left ear.       Speech Audiometry:        Right Ear:  Speech Reception Threshold (SRT) was obtained at 20 dBHL                 Speech Discrimination scores were 96% in quiet when words were presented at 60 dBHL       Left Ear:  Speech Reception Threshold (SRT) was obtained at 20 dBHL                 Speech Discrimination scores were 88% in quiet when words were presented at 60 dBHL    Reliability:   good    IMPRESSIONS:  1.  Tympanometry results are consistent with Middle ear function within normal limits in both ears.  2.  Pure tone results are consistent with within normal limits to severe sloping sensorineural hearing loss for both ears.     RECOMMENDATIONS:  Patient is seeing the Ear Nose and Throat physician immediately following this examination.  It was a pleasure seeing Sara Solis in Audiology today.  We would be happy to do further testing or discuss these test as necessary.      This document has been electronically signed by  GERALDO Day on November 2, 2018 9:59 AM          GERALDO Day  Licensed Audiologist

## 2018-11-07 ENCOUNTER — HOSPITAL ENCOUNTER (OUTPATIENT)
Dept: PHYSICAL THERAPY | Facility: HOSPITAL | Age: 78
Setting detail: THERAPIES SERIES
Discharge: HOME | End: 2018-11-07

## 2018-11-07 DIAGNOSIS — H81.11 BENIGN PAROXYSMAL POSITIONAL VERTIGO OF RIGHT EAR: ICD-10-CM

## 2018-11-07 DIAGNOSIS — R42 DIZZINESS: Primary | ICD-10-CM

## 2018-11-07 PROCEDURE — 97162 PT EVAL MOD COMPLEX 30 MIN: CPT | Performed by: PHYSICAL THERAPIST

## 2018-11-07 PROCEDURE — 95992 CANALITH REPOSITIONING PROC: CPT | Performed by: PHYSICAL THERAPIST

## 2018-11-07 PROCEDURE — G8982 BODY POS GOAL STATUS: HCPCS | Performed by: PHYSICAL THERAPIST

## 2018-11-07 PROCEDURE — G8981 BODY POS CURRENT STATUS: HCPCS | Performed by: PHYSICAL THERAPIST

## 2018-11-08 NOTE — THERAPY EVALUATION
Outpatient Physical Therapy Vestibular Initial Evaluation  [facility]     Patient Name: Sara Solis  : 1940  MRN: [id number]  Today's Date: 2018      Visit Date: 2018  Attendance:  (Medicare)  Subjective Improvement: n/a  Next MD Appt: 12/3/18  Recert Date: 18    Therapy Diagnosis: Vertigo; question BPPV R         Past Medical History:   Diagnosis Date   • Actinic keratosis    • Allergic rhinitis    • Altered bowel function    • Anxiety state    • Aptyalism    • Attention deficit hyperactivity disorder    • Bunion    • Cough    • Degenerative joint disease involving multiple joints    • Depressive disorder    • Diarrhea    • Disorder of skin    • Fissure in skin    • Foot pain    • Generalized anxiety disorder    • Hepatitis    • Hepatitis C    • Herpes labialis    • History of bone scan 10/01/2008    DXA BONE DENSITY AXIAL 21097 (2) - Osteopenia of the lumbar spine, which has improved compared to the prior study. Normal bone mineral density of the hips, which has improved compared to the prior study   • History of colonoscopy 2011    Colon endoscopy 58090 (2) - Diverticulosis found in sigmoid colon. Moderate fixation of sigmoid colon. Internal hemorrhoids found in anus.    • History of esophagogastroduodenoscopy 2011    EGD w/ tube 17611 (1) - Normal hypopharynx, GE junction, duodenum, symmetrical & patent pylorus. Normal esophagus. Dilatation performed. Chronic gastritis found in antrum. Biopsy taken.   • History of mammogram 10/01/2008    Mammogram, both breasts (1) - Negative mammogram. Recommend follow-up in 12 months;     • Incontinence of feces    • Increased frequency of urination    • Insomnia    • Irritable bowel syndrome    • Knee pain    • Lipoma of shoulder    • Low back pain    • Lumbosacral radiculopathy    • Nausea    • Nervous system abnormality     Disorder of the peripheral nervous system     • Osteopenia    • Pain in limb    • Pain in  lower limb    • Plantar fasciitis    • Pure hypercholesterolemia    • Restless legs    • Skin lesion     nose   • Spasm    • Spasm of back muscles    • Squamous cell carcinoma of skin    • Stress fracture    • Tear film insufficiency    • Trochanteric bursitis of right hip    • Upper respiratory infection    • Urinary tract infectious disease    • Vertigo         Past Surgical History:   Procedure Laterality Date   • BREAST BIOPSY  12/04/1995    BIOPSY OF BREAST OPEN 75322 (2) - Right,Exscision of mass.Fibroadenoma. Fibrocystic disease, proliferative   • CATARACT EXTRACTION WITH INTRAOCULAR LENS IMPLANT Right 03/07/2006    Remove cataract, insert lens (1) - right   • DILATATION AND CURETTAGE  10/02/1986    D&C (1) - Fractional D&C laparoscopic tubal sterilization. Plus uterine fibroids, approximately 10-12 weeks in size   • FOOT SURGERY  03/20/1990    Foot/toes surgery procedure (1) - Left,Excisional biopsy. Tumor, supposed fibroma, 5th toe   • HIP PINNING Right 12/21/2017    Procedure: HIP PINNING                 (C-ARM#3);  Surgeon: Basil Ulrich MD;  Location: [facility];  Service:    • INJECTION OF MEDICATION  06/08/2015    Kenalog (2) - SERENA Robert   • LIVER BIOPSY  04/26/2000    Needle biopsy of liver (1) - percutaneous liver biopsy;     • OTHER SURGICAL HISTORY  10/17/2002    Chest procedure (2) - Intralesional injection of keloid of chest    • OTHER SURGICAL HISTORY  10/25/2001    Remove axillary lymph nodes (1) - Biopsy, Left axillary adenopathy. 4 lymph nodes with reactive hyperplasia No tumor seen   • OTHER SURGICAL HISTORY  06/17/2013    Biopsy, Each Additional Lesion 90304 (1) - SERENA Robert   • OTHER SURGICAL HISTORY  07/14/2014    Destruction of Premalignant Lesion (1st) 91129 (2)  - SERENA Robert   • OTHER SURGICAL HISTORY  05/10/2016    Drain/Inject Major Joint 77139 (4) - SERENA Robert   • SKIN BIOPSY  07/28/2014    Biopsy of Skin 34150 (2)  - SERENA Robert   • TOTAL ABDOMINAL HYSTERECTOMY WITH SALPINGO  OOPHORECTOMY  09/25/1990    incidental appendectomy.Uterine fibroids       Current Outpatient Prescriptions:   •  acetaminophen (TYLENOL) 325 MG tablet, Take 2 tablets by mouth Every 4 (Four) Hours As Needed for Mild Pain , Headache or Fever., Disp: , Rfl:   •  cetirizine (zyrTEC) 10 MG tablet, Take 1 tablet by mouth Every Night., Disp: 30 tablet, Rfl: 11  •  cholecalciferol (VITAMIN D3) 1000 UNITS tablet, Take 1,000 Units by mouth Daily., Disp: , Rfl:   •  fluticasone (FLONASE) 50 MCG/ACT nasal spray, 2 sprays into the nostril(s) as directed by provider Daily., Disp: 1 bottle, Rfl: 11  •  gabapentin (NEURONTIN) 600 MG tablet, 1/2 to one tablet tid prn., Disp: 270 tablet, Rfl: 1  •  Magnesium 100 MG capsule, Take 400 mg by mouth Daily., Disp: , Rfl:   •  meloxicam (MOBIC) 15 MG tablet, Take 1 tablet by mouth Daily., Disp: 21 tablet, Rfl: 0  •  Multiple Vitamins-Minerals (CENTRUM SILVER PO), Take  by mouth Daily., Disp: , Rfl:   •  oxyCODONE-acetaminophen (PERCOCET) 5-325 MG per tablet, Take 1-2 tablets by mouth Every 4 (Four) Hours As Needed for Moderate Pain ., Disp: 24 tablet, Rfl: 0  •  pramipexole (MIRAPEX) 0.5 MG tablet, Take 2 tablets by mouth Every Night., Disp: 180 tablet, Rfl: 3  •  promethazine (PHENERGAN) 25 MG tablet, Take 1 tablet by mouth Every 6 (Six) Hours As Needed for Nausea or Vomiting., Disp: 30 tablet, Rfl: 0  •  valACYclovir (VALTREX) 1000 MG tablet, Take 1 tablet by mouth Take As Directed., Disp: 30 tablet, Rfl: 11    No Known Allergies      Visit Dx:     ICD-10-CM ICD-9-CM   1. Dizziness R42 780.4   2. Benign paroxysmal positional vertigo of right ear H81.11 386.11             Patient History     Row Name 11/07/18 1000          Chief Complaint Dizziness;Balance Problems  -SS    Date Current Problem(s) Began --   chronic  -SS    Brief Description of Current Complaint Patient reports that she has been having vertigo issues off and on for years. She has been successfully treated in the past.  "Current episode of dizziness for the past 4-5 months. States that she uses her walker first thing in the morning due to dizziness and feeling off balance. States that if \"feels like it's in my head.\" Feels dizzier laying flat in bed. Doesn't notice it as much rolling as laying. Does feel it with position changes.  female with children. Lives in a single story house with 3 steps to enter. Uses handrail and takes stairs one at a time.   -SS    Patient/Caregiver Goals Relief from dizziness  -SS    Current Tobacco Use no  -SS    Smoking Status no  -SS    Patient's Rating of General Health Fair  -SS    Occupation/sports/leisure activities Retired. Hobbies: games, crossword puzzles  -SS          Pain Location Generalized  -SS    Pain at Present 2;3  -SS    Is your sleep disturbed? Yes  -SS    Is medication used to assist with sleep? Yes  -SS    Difficulties at work? n/a  -SS    Difficulties with ADL's? some dizziness  -SS    Difficulties with recreational activities? none  -SS          Any falls in the past year: Yes  -SS    Number of falls reported in the last 12 months 1  -SS    Factors that contributed to the fall: --   unsure  -SS    Does patient have a fear of falling Yes (comment)  -SS          Primary Language English  -SS          Are you being hurt, hit, or frightened by anyone at home or in your life? No  -SS    Are you being neglected by a caregiver No  -SS      User Key  (r) = Recorded By, (t) = Taken By, (c) = Cosigned By    Initials Name Provider Type    SS Tevin Marroquin PT DPT Physical Therapist                Sher Portillo Name 11/07/18 1000          Subjective Comments see Therapy Patient History  -SS          Pain Assessment --   see Therapy Patient History  -SS          Fall History 1 fall in past 12 month  -SS    Use of Assistive Devices walker intermittently  -SS          Orientation Level Oriented to place;Oriented to time;Oriented to situation;Oriented to person  -SS       " "   Type of Dizziness Imbalance;Funny feeling in head  -SS    Frequency of Dizziness Several Times a Day  -SS    Duration of Dizziness --   constant  -SS    VAS Intensity (0-10) 4  -SS    Aggravating Factors Looking up to the ceiling;Lying supine;Moving eyes;Turning body quickly;Turning head quickly;Supine to sit;Forward bending  -SS    Relieving Factors --   \"be still\"  -SS          Occular ROM Normal   symptom provoking each direction  -SS    Spontaneous Nystagmus Absent  -SS    Gaze-induced Nystagmus Absent  -SS    Head Shaking Horizontal Absent   mildly symptom provoking  -SS    Head Shaking Vertical Absent   mildly symptom provoking  -SS    Smooth Pursuit Saccadic;Symptom Provoking   horizontal to right  -SS    Saccades Overshoots   to right  -SS    Convergence Normal  -SS          VOR 1 Head Only intermittent nystagmus when turning to R; nausea at 30 bpm that increases at 90 bpm  -SS    VOR Cancellation Normal  -SS          Positional Testing Without infrared goggles  -SS    Vertebrobasilar Artery Screen - Right --   blurry vision with position and increased dizziness when RTN  -SS    Vertebrobasilar Artery Screen - Left --   blurry vision with position and increased dizziness when RTN  -SS    [place]-Hallpike Right No nystagmus   dizzy  -SS    [place]-Hallpike Left No nystagmus   dizziness  -SS    Horizontal Roll Test Right Ageotropic nystagmus  -SS    Horizontal Roll Test Left Ageotropic nystagmus  -SS      User Key  (r) = Recorded By, (t) = Taken By, (c) = Cosigned By    Initials Name Provider Type    SS Tevin Marroquin, AIDEE DPT Physical Therapist                      Therapy Education  Education Details: therapy plan, expectations post-canalith repositioning  Given: Other (comment)  How Provided: Verbal  Provided to: Patient (patient's family member)  Level of Understanding: Verbalized            Exercises     Row Name 11/07/18 1000          Subjective Comments see Therapy Patient History  -SS          " Exercise Name 1 Hybrid horizontal maneuver  -SS    Cueing 1 Verbal;Tactile  -SS    Sets 1 1  -SS      User Key  (r) = Recorded By, (t) = Taken By, (c) = Cosigned By    Initials Name Provider Type    Tevin Lopez, PT DPT Physical Therapist                            PT OP Goals     Row Name 11/07/18 1000          STG Date to Achieve 11/28/18  -SS    STG 1 Note a >/= 25% subjective improvement  -SS    STG 2 Improve Dizziness Handicap Inventory to </= 40  -SS    STG 3 Negative testing of BPPV  -SS          LTG Date to Achieve 12/19/18  -SS    LTG 1 Independent with HEP  -SS    LTG 2 Minimal dizziness with position changes and functional activities  -    LTG 3 Dizziness Handicap Inventory score </= 25  -          PT Goal Re-Cert Due Date 11/28/18  -      User Key  (r) = Recorded By, (t) = Taken By, (c) = Cosigned By    Initials Name Provider Type    Tevin Lopez, PT DPT Physical Therapist                PT Assessment/Plan     Row Name 11/07/18 1000          Functional Limitations Decreased safety during functional activities;Impaired gait;Limitation in home management;Limitations in community activities;Limitations in functional capacity and performance;Performance in leisure activities;Performance in self-care ADL  -    Impairments Balance   dizziness  -    Assessment Comments Patient has chronic vertigo symptoms. Successfully treated in the past with canalith repositioning. Some improvement in symptoms with canalith repositioning this date. Patient and patient's family member with her had numerous questions.   -    Rehab Potential Fair   barrier: chronicity  -    Patient/caregiver participated in establishment of treatment plan and goals Yes  -SS    Patient would benefit from skilled therapy intervention Yes  -SS          PT Frequency 2x/week  -SS    Predicted Duration of Therapy Intervention (Therapy Eval) 4-6 weeks  -SS    PT Plan Comments Vestibular rehabilitation including,  but not limited to, canalith repositioning, adaptation, and habituation exercises and balance training.  -SS      User Key  (r) = Recorded By, (t) = Taken By, (c) = Cosigned By    Initials Name Provider Type    SS Tevin Marroquin, PT DPT Physical Therapist           Outcome Measure Options: Dizziness Handicap Inventory (Functional 24, Emotional 10, Physical 22, Total 56)         Time Calculation:   Start Time: 1020  Stop Time: 1113  Time Calculation (min): 53 min     Therapy Charges for Today     Code Description Service Date Service Provider Modifiers Qty    93665013195 HC PT CHNG MAIN POS CURRENT 11/7/2018 Tevin Marroquin, PT DPT GP, CK 1    12911614902 HC PT CHNG MAIN POS PROJECTED 11/7/2018 Tevin Marroquin, PT DPT GP, CJ 1    53511592985 HC PT CANALITH REPOSITIONING PER DAY 11/7/2018 Tevin Marroquin, PT DPT GP 1    04521339544 HC PT EVAL MOD COMPLEXITY 3 11/7/2018 Tevin Marroquin, PT DPT GP 1          PT G-Codes  PT Professional Judgement Used?: Yes  Outcome Measure Options: Dizziness Handicap Inventory (Functional 24, Emotional 10, Physical 22, Total 56)  Functional Limitation: Changing and maintaining body position  Changing and Maintaining Body Position Current Status (): At least 40 percent but less than 60 percent impaired, limited or restricted  Changing and Maintaining Body Position Goal Status (): At least 20 percent but less than 40 percent impaired, limited or restricted         Tevin Marroquin, PT, DPT, CHT  11/7/2018

## 2018-11-13 ENCOUNTER — HOSPITAL ENCOUNTER (OUTPATIENT)
Dept: PHYSICAL THERAPY | Facility: HOSPITAL | Age: 78
Setting detail: THERAPIES SERIES
Discharge: HOME | End: 2018-11-13

## 2018-11-13 DIAGNOSIS — R42 DIZZINESS: ICD-10-CM

## 2018-11-13 DIAGNOSIS — H81.11 BENIGN PAROXYSMAL POSITIONAL VERTIGO OF RIGHT EAR: Primary | ICD-10-CM

## 2018-11-13 PROCEDURE — 97110 THERAPEUTIC EXERCISES: CPT

## 2018-11-13 NOTE — PROGRESS NOTES
Outpatient Physical Therapy Ortho Treatment Note   [facility]     Patient Name: Sara Solis  : 1940  MRN: [id number]  Today's Date: 2018      Visit Date: 2018    Subjective Improvement:     NA  Attendance:   Approved:   Per Medicare MD follow up:     18       date:     18    Visit Dx:    ICD-10-CM ICD-9-CM   1. Benign paroxysmal positional vertigo of right ear H81.11 386.11   2. Dizziness R42 780.4       Patient Active Problem List   Diagnosis   • Low back pain   • Actinic keratosis   • Shoulder pain, left   • Pure hypercholesterolemia   • Nausea   • History of fatigue   • Insomnia   • Closed subcapital fracture of right femur (CMS/HCC)        Past Medical History:   Diagnosis Date   • Actinic keratosis    • Allergic rhinitis    • Altered bowel function    • Anxiety state    • Aptyalism    • Attention deficit hyperactivity disorder    • Bunion    • Cough    • Degenerative joint disease involving multiple joints    • Depressive disorder    • Diarrhea    • Disorder of skin    • Fissure in skin    • Foot pain    • Generalized anxiety disorder    • Hepatitis    • Hepatitis C    • Herpes labialis    • History of bone scan 10/01/2008    DXA BONE DENSITY AXIAL 56017 (2) - Osteopenia of the lumbar spine, which has improved compared to the prior study. Normal bone mineral density of the hips, which has improved compared to the prior study   • History of colonoscopy 2011    Colon endoscopy 90634 (2) - Diverticulosis found in sigmoid colon. Moderate fixation of sigmoid colon. Internal hemorrhoids found in anus.    • History of esophagogastroduodenoscopy 2011    EGD w/ tube 57468 (1) - Normal hypopharynx, GE junction, duodenum, symmetrical & patent pylorus. Normal esophagus. Dilatation performed. Chronic gastritis found in antrum. Biopsy taken.   • History of mammogram 10/01/2008    Mammogram, both breasts (1) - Negative mammogram. Recommend  follow-up in 12 months;     • Incontinence of feces    • Increased frequency of urination    • Insomnia    • Irritable bowel syndrome    • Knee pain    • Lipoma of shoulder    • Low back pain    • Lumbosacral radiculopathy    • Nausea    • Nervous system abnormality     Disorder of the peripheral nervous system     • Osteopenia    • Pain in limb    • Pain in lower limb    • Plantar fasciitis    • Pure hypercholesterolemia    • Restless legs    • Skin lesion     nose   • Spasm    • Spasm of back muscles    • Squamous cell carcinoma of skin    • Stress fracture    • Tear film insufficiency    • Trochanteric bursitis of right hip    • Upper respiratory infection    • Urinary tract infectious disease    • Vertigo         Past Surgical History:   Procedure Laterality Date   • BREAST BIOPSY  12/04/1995    BIOPSY OF BREAST OPEN 55641 (2) - Right,Exscision of mass.Fibroadenoma. Fibrocystic disease, proliferative   • CATARACT EXTRACTION WITH INTRAOCULAR LENS IMPLANT Right 03/07/2006    Remove cataract, insert lens (1) - right   • DILATATION AND CURETTAGE  10/02/1986    D&C (1) - Fractional D&C laparoscopic tubal sterilization. Plus uterine fibroids, approximately 10-12 weeks in size   • FOOT SURGERY  03/20/1990    Foot/toes surgery procedure (1) - Left,Excisional biopsy. Tumor, supposed fibroma, 5th toe   • INJECTION OF MEDICATION  06/08/2015    Kenalog (2) - SERENA Robert   • LIVER BIOPSY  04/26/2000    Needle biopsy of liver (1) - percutaneous liver biopsy;     • OTHER SURGICAL HISTORY  10/17/2002    Chest procedure (2) - Intralesional injection of keloid of chest    • OTHER SURGICAL HISTORY  10/25/2001    Remove axillary lymph nodes (1) - Biopsy, Left axillary adenopathy. 4 lymph nodes with reactive hyperplasia No tumor seen   • OTHER SURGICAL HISTORY  06/17/2013    Biopsy, Each Additional Lesion 74630 (1) - SERENA Robert   • OTHER SURGICAL HISTORY  07/14/2014    Destruction of Premalignant Lesion (1st) 12634 (2)  - SERENA Robert   • OTHER  "SURGICAL HISTORY  05/10/2016    Drain/Inject Major Joint 31243 (4) - SERENA Robert   • SKIN BIOPSY  07/28/2014    Biopsy of Skin 08235 (2)  - SERENA Robert   • TOTAL ABDOMINAL HYSTERECTOMY WITH SALPINGO OOPHORECTOMY  09/25/1990    incidental appendectomy.Uterine fibroids                       PT Assessment/Plan     Row Name 11/13/18 1000          PT Assessment    Assessment Comments  Pt had dizziness with position changes.  Tolerated Epley maneuver well.  No nystagmus during treatment.  Had mild dizziness, but no LOB with head turns during gait.    -TM     Patient/caregiver participated in establishment of treatment plan and goals  Yes  -TM        PT Plan    PT Frequency  2x/week  -TM     Predicted Duration of Therapy Intervention (Therapy Eval)  4-6 weeks  -TM     PT Plan Comments  Cont Epley maneuver & balance exercises  -TM       User Key  (r) = Recorded By, (t) = Taken By, (c) = Cosigned By    Initials Name Provider Type    Debra Keita, PTA Physical Therapy Assistant              Exercises     Row Name 11/13/18 1000             Exercise 1    Exercise Name 1  Epleys maneuver  -TM      Sets 1  2  -TM      Time 1  10 min  -TM         Exercise 2    Exercise Name 2  gaze stabilization vertical/horiz  -TM      Time 2  3 min  -TM         Exercise 3    Exercise Name 3  static stand NBOS EO  -TM      Time 3  30\"  -TM         Exercise 4    Exercise Name 4  static stand NBOS EC  -TM      Time 4  30\"  -TM         Exercise 5    Exercise Name 5  Airex stance NBOS EO  -TM      Time 5  30\"  -TM         Exercise 6    Exercise Name 6  Airex split stance  -TM      Time 6  30\"  -TM         Exercise 7    Exercise Name 7  march in place  -TM      Time 7  30\"  -TM         Exercise 8    Exercise Name 8  sidestepping  -TM      Additional Comments  20ft x 2  -TM         Exercise 9    Exercise Name 9  gait with head turns on command  -TM      Additional Comments  45ft x 2  -TM        User Key  (r) = Recorded By, (t) = Taken By, (c) = " Cosigned By    Initials Name Provider Type     Debra Luque PTA Physical Therapy Assistant                         PT OP Goals     Row Name 11/13/18 1100          PT Short Term Goals    STG Date to Achieve  11/28/18  -TM     STG 1  Note a >/= 25% subjective improvement  -TM     STG 1 Progress  Ongoing  -TM     STG 2  Improve Dizziness Handicap Inventory to </= 40  -TM     STG 2 Progress  Ongoing  -TM     STG 3  Negative testing of BPPV  -TM     STG 3 Progress  Ongoing  -TM        Long Term Goals    LTG Date to Achieve  12/19/18  -TM     LTG 1  Independent with HEP  -TM     LTG 1 Progress  Ongoing  -TM     LTG 2  Minimal dizziness with position changes and functional activities  -TM     LTG 2 Progress  Ongoing  -TM     LTG 3  Dizziness Handicap Inventory score </= 25  -TM     LTG 3 Progress  Ongoing  -TM        Time Calculation    PT Goal Re-Cert Due Date  11/28/18  -       User Key  (r) = Recorded By, (t) = Taken By, (c) = Cosigned By    Initials Name Provider Type     Debra Luque PTA Physical Therapy Assistant                         Time Calculation:   Start Time: 1014  Stop Time: 1055  Time Calculation (min): 41 min  Total Timed Code Minutes- PT: 41 minute(s)  Therapy Suggested Charges     Code   Minutes Charges    None           Therapy Charges for Today     Code Description Service Date Service Provider Modifiers Qty    42578671151 HC PT THER PROC EA 15 MIN 11/13/2018 Debra Luque PTA GP 3                    Debra Luque PTA  11/13/2018

## 2018-11-14 ENCOUNTER — APPOINTMENT (OUTPATIENT)
Dept: PHYSICAL THERAPY | Facility: HOSPITAL | Age: 78
End: 2018-11-14

## 2018-11-15 ENCOUNTER — HOSPITAL ENCOUNTER (OUTPATIENT)
Dept: PHYSICAL THERAPY | Facility: HOSPITAL | Age: 78
Setting detail: THERAPIES SERIES
Discharge: HOME | End: 2018-11-15

## 2018-11-15 ENCOUNTER — APPOINTMENT (OUTPATIENT)
Dept: PHYSICAL THERAPY | Facility: HOSPITAL | Age: 78
End: 2018-11-15

## 2018-11-15 DIAGNOSIS — H81.11 BENIGN PAROXYSMAL POSITIONAL VERTIGO OF RIGHT EAR: Primary | ICD-10-CM

## 2018-11-15 DIAGNOSIS — R42 DIZZINESS: ICD-10-CM

## 2018-11-15 PROCEDURE — 97110 THERAPEUTIC EXERCISES: CPT

## 2018-11-15 NOTE — PROGRESS NOTES
Outpatient Physical Therapy Ortho Progress Note   Brayan Logan     Patient Name: Sara Solis  : 1940  MRN: [id number]  Today's Date: 11/15/2018      Visit Date: 11/15/2018    Subjective Improvement:     none  Attendance: 3/3  Approved:      Per Medicare MD follow up:   18         date:   18      Visit Dx:    ICD-10-CM ICD-9-CM   1. Benign paroxysmal positional vertigo of right ear H81.11 386.11   2. Dizziness R42 780.4       Patient Active Problem List   Diagnosis   • Low back pain   • Actinic keratosis   • Shoulder pain, left   • Pure hypercholesterolemia   • Nausea   • History of fatigue   • Insomnia   • Closed subcapital fracture of right femur (CMS/HCC)        Past Medical History:   Diagnosis Date   • Actinic keratosis    • Allergic rhinitis    • Altered bowel function    • Anxiety state    • Aptyalism    • Attention deficit hyperactivity disorder    • Bunion    • Cough    • Degenerative joint disease involving multiple joints    • Depressive disorder    • Diarrhea    • Disorder of skin    • Fissure in skin    • Foot pain    • Generalized anxiety disorder    • Hepatitis    • Hepatitis C    • Herpes labialis    • History of bone scan 10/01/2008    DXA BONE DENSITY AXIAL 03925 (2) - Osteopenia of the lumbar spine, which has improved compared to the prior study. Normal bone mineral density of the hips, which has improved compared to the prior study   • History of colonoscopy 2011    Colon endoscopy 90087 (2) - Diverticulosis found in sigmoid colon. Moderate fixation of sigmoid colon. Internal hemorrhoids found in anus.    • History of esophagogastroduodenoscopy 2011    EGD w/ tube 14655 (1) - Normal hypopharynx, GE junction, duodenum, symmetrical & patent pylorus. Normal esophagus. Dilatation performed. Chronic gastritis found in antrum. Biopsy taken.   • History of mammogram 10/01/2008    Mammogram, both breasts (1) - Negative mammogram. Recommend  follow-up in 12 months;     • Incontinence of feces    • Increased frequency of urination    • Insomnia    • Irritable bowel syndrome    • Knee pain    • Lipoma of shoulder    • Low back pain    • Lumbosacral radiculopathy    • Nausea    • Nervous system abnormality     Disorder of the peripheral nervous system     • Osteopenia    • Pain in limb    • Pain in lower limb    • Plantar fasciitis    • Pure hypercholesterolemia    • Restless legs    • Skin lesion     nose   • Spasm    • Spasm of back muscles    • Squamous cell carcinoma of skin    • Stress fracture    • Tear film insufficiency    • Trochanteric bursitis of right hip    • Upper respiratory infection    • Urinary tract infectious disease    • Vertigo         Past Surgical History:   Procedure Laterality Date   • BREAST BIOPSY  12/04/1995    BIOPSY OF BREAST OPEN 87973 (2) - Right,Exscision of mass.Fibroadenoma. Fibrocystic disease, proliferative   • CATARACT EXTRACTION WITH INTRAOCULAR LENS IMPLANT Right 03/07/2006    Remove cataract, insert lens (1) - right   • DILATATION AND CURETTAGE  10/02/1986    D&C (1) - Fractional D&C laparoscopic tubal sterilization. Plus uterine fibroids, approximately 10-12 weeks in size   • FOOT SURGERY  03/20/1990    Foot/toes surgery procedure (1) - Left,Excisional biopsy. Tumor, supposed fibroma, 5th toe   • INJECTION OF MEDICATION  06/08/2015    Kenalog (2) - SERENA Robert   • LIVER BIOPSY  04/26/2000    Needle biopsy of liver (1) - percutaneous liver biopsy;     • OTHER SURGICAL HISTORY  10/17/2002    Chest procedure (2) - Intralesional injection of keloid of chest    • OTHER SURGICAL HISTORY  10/25/2001    Remove axillary lymph nodes (1) - Biopsy, Left axillary adenopathy. 4 lymph nodes with reactive hyperplasia No tumor seen   • OTHER SURGICAL HISTORY  06/17/2013    Biopsy, Each Additional Lesion 09762 (1) - SERENA Robert   • OTHER SURGICAL HISTORY  07/14/2014    Destruction of Premalignant Lesion (1st) 14610 (2)  - SERENA Robert   • OTHER  "SURGICAL HISTORY  05/10/2016    Drain/Inject Major Joint 46809 (4) - SERENA Robert   • SKIN BIOPSY  07/28/2014    Biopsy of Skin 68077 (2)  - [facility] Robert   • TOTAL ABDOMINAL HYSTERECTOMY WITH SALPINGO OOPHORECTOMY  09/25/1990    incidental appendectomy.Uterine fibroids                       PT Assessment/Plan     Row Name 11/15/18 1100 11/15/18 1000       PT Assessment    Assessment Comments  Pt had nystagmus with Epley maneuver today.  Reported dizziness not any worse at end of RX.    -TM  --    Patient/caregiver participated in establishment of treatment plan and goals  Yes  -TM  Yes  -TM       PT Plan    PT Frequency  2x/week  -TM  2x/week  -TM    Predicted Duration of Therapy Intervention (Therapy Eval)  4-6 weeks  -TM  4-6 weeks  -TM    PT Plan Comments  Cont Epley, Habituation & balance activities  -TM  --      User Key  (r) = Recorded By, (t) = Taken By, (c) = Cosigned By    Initials Name Provider Type    TM Debra Luque, PTA Physical Therapy Assistant              Exercises     Row Name 11/15/18 1000             Subjective Comments    Subjective Comments  Pt reports dizziness has been worse since last visit.    -TM         Exercise 1    Exercise Name 1  Epleys maneuver  -TM      Sets 1  2  -TM      Time 1  10 min  -TM         Exercise 2    Exercise Name 2  gaze stabilization vertical/horiz  -TM      Time 2  3 min  -TM         Exercise 3    Exercise Name 3  Airex stance EC  -TM      Reps 3  2  -TM      Time 3  30\"  -TM         Exercise 4    Exercise Name 4  Airex beam tandem stance  -TM      Reps 4  2 each  -TM      Time 4  30\"  -TM         Exercise 5    Exercise Name 5  Airex march  -TM      Time 5  30\"  -TM      Additional Comments  no HHA  -TM         Exercise 6    Exercise Name 6  Split stance with 1 LE on 6\" step w/ball lifts  -TM      Sets 6  2  -TM      Reps 6  10  -TM         Exercise 7    Exercise Name 7  Habituation  -TM      Time 7  3 min  -TM         Exercise 8    Exercise Name 8  picking up cones " from floor  -TM      Reps 8  8  -TM        User Key  (r) = Recorded By, (t) = Taken By, (c) = Cosigned By    Initials Name Provider Type     Debra Luque PTA Physical Therapy Assistant                         PT OP Goals     Row Name 11/15/18 1000          PT Short Term Goals    STG Date to Achieve  11/28/18  -TM     STG 1  Note a >/= 25% subjective improvement  -TM     STG 1 Progress  Ongoing  -TM     STG 2  Improve Dizziness Handicap Inventory to </= 40  -TM     STG 2 Progress  Ongoing  -TM     STG 3  Negative testing of BPPV  -TM     STG 3 Progress  Ongoing  -TM        Long Term Goals    LTG Date to Achieve  12/19/18  -TM     LTG 1  Independent with HEP  -TM     LTG 1 Progress  Ongoing  -TM     LTG 2  Minimal dizziness with position changes and functional activities  -TM     LTG 2 Progress  Ongoing  -TM     LTG 3  Dizziness Handicap Inventory score </= 25  -TM     LTG 3 Progress  Ongoing  -TM       User Key  (r) = Recorded By, (t) = Taken By, (c) = Cosigned By    Initials Name Provider Type     Debra Luque PTA Physical Therapy Assistant                         Time Calculation:   Start Time: 1015  Stop Time: 1100  Time Calculation (min): 45 min  Total Timed Code Minutes- PT: 45 minute(s)  Therapy Suggested Charges     Code   Minutes Charges    None           Therapy Charges for Today     Code Description Service Date Service Provider Modifiers Qty    69344218280 HC PT THER PROC EA 15 MIN 11/15/2018 Debra Luque PTA GP 3                    Debra Luque PTA  11/15/2018

## 2018-11-16 ENCOUNTER — APPOINTMENT (OUTPATIENT)
Dept: PHYSICAL THERAPY | Facility: HOSPITAL | Age: 78
End: 2018-11-16

## 2018-11-20 ENCOUNTER — HOSPITAL ENCOUNTER (OUTPATIENT)
Dept: PHYSICAL THERAPY | Facility: HOSPITAL | Age: 78
Setting detail: THERAPIES SERIES
Discharge: HOME | End: 2018-11-20

## 2018-11-20 DIAGNOSIS — H81.11 BENIGN PAROXYSMAL POSITIONAL VERTIGO OF RIGHT EAR: Primary | ICD-10-CM

## 2018-11-20 PROCEDURE — 97110 THERAPEUTIC EXERCISES: CPT

## 2018-11-20 NOTE — PROGRESS NOTES
Outpatient Physical Therapy Ortho Treatment Note   Brayan Logan     Patient Name: Sara Solis  : 1940  MRN: [id number]  Today's Date: 2018      Visit Date: 2018    Subjective Improvement:    None yet    Attendance:   Approved:     Per Medicare MD follow up:    18        date:     18    Visit Dx:    ICD-10-CM ICD-9-CM   1. Benign paroxysmal positional vertigo of right ear H81.11 386.11       Patient Active Problem List   Diagnosis   • Low back pain   • Actinic keratosis   • Shoulder pain, left   • Pure hypercholesterolemia   • Nausea   • History of fatigue   • Insomnia   • Closed subcapital fracture of right femur (CMS/HCC)        Past Medical History:   Diagnosis Date   • Actinic keratosis    • Allergic rhinitis    • Altered bowel function    • Anxiety state    • Aptyalism    • Attention deficit hyperactivity disorder    • Bunion    • Cough    • Degenerative joint disease involving multiple joints    • Depressive disorder    • Diarrhea    • Disorder of skin    • Fissure in skin    • Foot pain    • Generalized anxiety disorder    • Hepatitis    • Hepatitis C    • Herpes labialis    • History of bone scan 10/01/2008    DXA BONE DENSITY AXIAL 59569 (2) - Osteopenia of the lumbar spine, which has improved compared to the prior study. Normal bone mineral density of the hips, which has improved compared to the prior study   • History of colonoscopy 2011    Colon endoscopy 65300 (2) - Diverticulosis found in sigmoid colon. Moderate fixation of sigmoid colon. Internal hemorrhoids found in anus.    • History of esophagogastroduodenoscopy 2011    EGD w/ tube 71409 (1) - Normal hypopharynx, GE junction, duodenum, symmetrical & patent pylorus. Normal esophagus. Dilatation performed. Chronic gastritis found in antrum. Biopsy taken.   • History of mammogram 10/01/2008    Mammogram, both breasts (1) - Negative mammogram. Recommend follow-up in 12 months;      • Incontinence of feces    • Increased frequency of urination    • Insomnia    • Irritable bowel syndrome    • Knee pain    • Lipoma of shoulder    • Low back pain    • Lumbosacral radiculopathy    • Nausea    • Nervous system abnormality     Disorder of the peripheral nervous system     • Osteopenia    • Pain in limb    • Pain in lower limb    • Plantar fasciitis    • Pure hypercholesterolemia    • Restless legs    • Skin lesion     nose   • Spasm    • Spasm of back muscles    • Squamous cell carcinoma of skin    • Stress fracture    • Tear film insufficiency    • Trochanteric bursitis of right hip    • Upper respiratory infection    • Urinary tract infectious disease    • Vertigo         Past Surgical History:   Procedure Laterality Date   • BREAST BIOPSY  12/04/1995    BIOPSY OF BREAST OPEN 22169 (2) - Right,Exscision of mass.Fibroadenoma. Fibrocystic disease, proliferative   • CATARACT EXTRACTION WITH INTRAOCULAR LENS IMPLANT Right 03/07/2006    Remove cataract, insert lens (1) - right   • DILATATION AND CURETTAGE  10/02/1986    D&C (1) - Fractional D&C laparoscopic tubal sterilization. Plus uterine fibroids, approximately 10-12 weeks in size   • FOOT SURGERY  03/20/1990    Foot/toes surgery procedure (1) - Left,Excisional biopsy. Tumor, supposed fibroma, 5th toe   • INJECTION OF MEDICATION  06/08/2015    Kenalog (2) - SERENA Robert   • LIVER BIOPSY  04/26/2000    Needle biopsy of liver (1) - percutaneous liver biopsy;     • OTHER SURGICAL HISTORY  10/17/2002    Chest procedure (2) - Intralesional injection of keloid of chest    • OTHER SURGICAL HISTORY  10/25/2001    Remove axillary lymph nodes (1) - Biopsy, Left axillary adenopathy. 4 lymph nodes with reactive hyperplasia No tumor seen   • OTHER SURGICAL HISTORY  06/17/2013    Biopsy, Each Additional Lesion 82351 (1) - SERENA Robert   • OTHER SURGICAL HISTORY  07/14/2014    Destruction of Premalignant Lesion (1st) 54134 (2)  - SERENA Robert   • OTHER SURGICAL HISTORY   "05/10/2016    Drain/Inject Major Joint 12065 (4) - SERENA Robert   • SKIN BIOPSY  07/28/2014    Biopsy of Skin 03181 (2)  - SERENA Robert   • TOTAL ABDOMINAL HYSTERECTOMY WITH SALPINGO OOPHORECTOMY  09/25/1990    incidental appendectomy.Uterine fibroids       PT Ortho     Row Name 11/20/18 1000       Subjective Comments    Subjective Comments  Pt reports being able to lie supine on bed now without dizziness.    -TM      User Key  (r) = Recorded By, (t) = Taken By, (c) = Cosigned By    Initials Name Provider Type     Debra Luque, WEI Physical Therapy Assistant                      PT Assessment/Plan     Row Name 11/20/18 1000          PT Assessment    Assessment Comments  Pt conts with ongoing dizziness.  No significant improvement yet.    -TM     Patient/caregiver participated in establishment of treatment plan and goals  Yes  -TM        PT Plan    PT Frequency  2x/week  -TM     Predicted Duration of Therapy Intervention (Therapy Eval)  4-6 weeks  -TM     PT Plan Comments  Cont balance activities.  Try SLS  -TM       User Key  (r) = Recorded By, (t) = Taken By, (c) = Cosigned By    Initials Name Provider Type     Debra Luque, WEI Physical Therapy Assistant              Exercises     Row Name 11/20/18 1000             Subjective Comments    Subjective Comments  Pt reports being able to lie supine on bed now without dizziness.    -TM         Exercise 1    Exercise Name 1  gaze stabilization  -TM      Time 1  5 min  -TM         Exercise 2    Exercise Name 2  Habituation   -TM      Time 2  5 min  -TM         Exercise 3    Exercise Name 3  Airex stance EC  -TM      Reps 3  2  -TM      Time 3  30\"  -TM         Exercise 4    Exercise Name 4  Airex march  -TM      Time 4  30\"  -TM         Exercise 5    Exercise Name 5  CR/TR  -TM      Reps 5  20  -TM         Exercise 6    Exercise Name 6  Split stance with 1 LE on 6\" step w/ball lifts  -TM      Sets 6  2  -TM         Exercise 7    Exercise Name 7  step ups fwd  -TM  " "    Sets 7  2  -TM      Reps 7  10  -TM      Additional Comments  6\" step  -TM         Exercise 8    Exercise Name 8  Airex beam tandem stance  -TM      Reps 8  2  -TM      Time 8  30\"  -TM        User Key  (r) = Recorded By, (t) = Taken By, (c) = Cosigned By    Initials Name Provider Type     Debra Luque, WEI Physical Therapy Assistant                         PT OP Goals     Row Name 11/20/18 1000          PT Short Term Goals    STG Date to Achieve  11/28/18  -TM     STG 1  Note a >/= 25% subjective improvement  -TM     STG 1 Progress  Ongoing  -TM     STG 2  Improve Dizziness Handicap Inventory to </= 40  -TM     STG 2 Progress  Ongoing  -TM     STG 3  Negative testing of BPPV  -TM     STG 3 Progress  Ongoing  -TM        Long Term Goals    LTG Date to Achieve  12/19/18  -TM     LTG 1  Independent with HEP  -TM     LTG 1 Progress  Ongoing  -TM     LTG 2  Minimal dizziness with position changes and functional activities  -TM     LTG 2 Progress  Ongoing  -TM     LTG 3  Dizziness Handicap Inventory score </= 25  -TM     LTG 3 Progress  Ongoing  -TM       User Key  (r) = Recorded By, (t) = Taken By, (c) = Cosigned By    Initials Name Provider Type     Debra Luque, WEI Physical Therapy Assistant                         Time Calculation:   Start Time: 1011  Stop Time: 1055  Time Calculation (min): 44 min  Total Timed Code Minutes- PT: 44 minute(s)  Therapy Suggested Charges     Code   Minutes Charges    None           Therapy Charges for Today     Code Description Service Date Service Provider Modifiers Qty    95517101010 HC PT THER PROC EA 15 MIN 11/20/2018 Debra Luque PTA GP 3                    Debra Luque PTA  11/20/2018     "

## 2018-11-27 ENCOUNTER — APPOINTMENT (OUTPATIENT)
Dept: PHYSICAL THERAPY | Facility: HOSPITAL | Age: 78
End: 2018-11-27

## 2018-11-29 ENCOUNTER — HOSPITAL ENCOUNTER (OUTPATIENT)
Dept: PHYSICAL THERAPY | Facility: HOSPITAL | Age: 78
Setting detail: THERAPIES SERIES
Discharge: HOME | End: 2018-11-29

## 2018-11-29 DIAGNOSIS — H81.11 BENIGN PAROXYSMAL POSITIONAL VERTIGO OF RIGHT EAR: Primary | ICD-10-CM

## 2018-11-29 DIAGNOSIS — R42 DIZZINESS: ICD-10-CM

## 2018-11-29 PROCEDURE — 97110 THERAPEUTIC EXERCISES: CPT

## 2018-11-29 NOTE — PROGRESS NOTES
Outpatient Physical Therapy Ortho Treatment Note   Brayan Logan     Patient Name: Sara Solis  : 1940  MRN: [id number]  Today's Date: 2018      Visit Date: 2018    Subjective Improvement:     none  Attendance:   Approved:     Per Medicare MD follow up:    18        date:    18     Visit Dx:    ICD-10-CM ICD-9-CM   1. Benign paroxysmal positional vertigo of right ear H81.11 386.11   2. Dizziness R42 780.4       Patient Active Problem List   Diagnosis   • Low back pain   • Actinic keratosis   • Shoulder pain, left   • Pure hypercholesterolemia   • Nausea   • History of fatigue   • Insomnia   • Closed subcapital fracture of right femur (CMS/HCC)        Past Medical History:   Diagnosis Date   • Actinic keratosis    • Allergic rhinitis    • Altered bowel function    • Anxiety state    • Aptyalism    • Attention deficit hyperactivity disorder    • Bunion    • Cough    • Degenerative joint disease involving multiple joints    • Depressive disorder    • Diarrhea    • Disorder of skin    • Fissure in skin    • Foot pain    • Generalized anxiety disorder    • Hepatitis    • Hepatitis C    • Herpes labialis    • History of bone scan 10/01/2008    DXA BONE DENSITY AXIAL 86007 (2) - Osteopenia of the lumbar spine, which has improved compared to the prior study. Normal bone mineral density of the hips, which has improved compared to the prior study   • History of colonoscopy 2011    Colon endoscopy 17259 (2) - Diverticulosis found in sigmoid colon. Moderate fixation of sigmoid colon. Internal hemorrhoids found in anus.    • History of esophagogastroduodenoscopy 2011    EGD w/ tube 32968 (1) - Normal hypopharynx, GE junction, duodenum, symmetrical & patent pylorus. Normal esophagus. Dilatation performed. Chronic gastritis found in antrum. Biopsy taken.   • History of mammogram 10/01/2008    Mammogram, both breasts (1) - Negative mammogram. Recommend  follow-up in 12 months;     • Incontinence of feces    • Increased frequency of urination    • Insomnia    • Irritable bowel syndrome    • Knee pain    • Lipoma of shoulder    • Low back pain    • Lumbosacral radiculopathy    • Nausea    • Nervous system abnormality     Disorder of the peripheral nervous system     • Osteopenia    • Pain in limb    • Pain in lower limb    • Plantar fasciitis    • Pure hypercholesterolemia    • Restless legs    • Skin lesion     nose   • Spasm    • Spasm of back muscles    • Squamous cell carcinoma of skin    • Stress fracture    • Tear film insufficiency    • Trochanteric bursitis of right hip    • Upper respiratory infection    • Urinary tract infectious disease    • Vertigo         Past Surgical History:   Procedure Laterality Date   • BREAST BIOPSY  12/04/1995    BIOPSY OF BREAST OPEN 77595 (2) - Right,Exscision of mass.Fibroadenoma. Fibrocystic disease, proliferative   • CATARACT EXTRACTION WITH INTRAOCULAR LENS IMPLANT Right 03/07/2006    Remove cataract, insert lens (1) - right   • DILATATION AND CURETTAGE  10/02/1986    D&C (1) - Fractional D&C laparoscopic tubal sterilization. Plus uterine fibroids, approximately 10-12 weeks in size   • FOOT SURGERY  03/20/1990    Foot/toes surgery procedure (1) - Left,Excisional biopsy. Tumor, supposed fibroma, 5th toe   • INJECTION OF MEDICATION  06/08/2015    Kenalog (2) - SERENA Robert   • LIVER BIOPSY  04/26/2000    Needle biopsy of liver (1) - percutaneous liver biopsy;     • OTHER SURGICAL HISTORY  10/17/2002    Chest procedure (2) - Intralesional injection of keloid of chest    • OTHER SURGICAL HISTORY  10/25/2001    Remove axillary lymph nodes (1) - Biopsy, Left axillary adenopathy. 4 lymph nodes with reactive hyperplasia No tumor seen   • OTHER SURGICAL HISTORY  06/17/2013    Biopsy, Each Additional Lesion 97057 (1) - SERENA Robert   • OTHER SURGICAL HISTORY  07/14/2014    Destruction of Premalignant Lesion (1st) 56597 (2)  - SERENA Robert   • OTHER  "SURGICAL HISTORY  05/10/2016    Drain/Inject Major Joint 14030 (4) - SERENA Robert   • SKIN BIOPSY  07/28/2014    Biopsy of Skin 17936 (2)  - SERENA Robert   • TOTAL ABDOMINAL HYSTERECTOMY WITH SALPINGO OOPHORECTOMY  09/25/1990    incidental appendectomy.Uterine fibroids                       PT Assessment/Plan     Row Name 11/29/18 0900          PT Assessment    Assessment Comments  Pt's dizziness a little worse post RX.  No LOB with exercises.  -TM     Patient/caregiver participated in establishment of treatment plan and goals  Yes  -TM        PT Plan    PT Frequency  2x/week  -TM     Predicted Duration of Therapy Intervention (Therapy Eval)  4-6 weeks  -TM     PT Plan Comments  Try SLS  -TM       User Key  (r) = Recorded By, (t) = Taken By, (c) = Cosigned By    Initials Name Provider Type    Debra Keita, PTA Physical Therapy Assistant              Exercises     Row Name 11/29/18 0800             Subjective Comments    Subjective Comments  Pt reports she can't hardly walk today because she is so dizzy.    -TM         Subjective Pain    Able to rate subjective pain?  yes  -TM      Pre-Treatment Pain Level  0  -TM      Post-Treatment Pain Level  0  -TM         Exercise 1    Exercise Name 1  Jeremy-Hallpike  -TM      Time 1  3 min  -TM      Additional Comments  no nystagmus  -TM         Exercise 2    Exercise Name 2  gaze stabilixation  -TM      Time 2  5 min  -TM         Exercise 3    Exercise Name 3  sit <->stands  -TM      Sets 3  2  -TM      Reps 3  10  -TM         Exercise 4    Exercise Name 4  Airex march  -TM      Time 4  30\"  -TM         Exercise 5    Exercise Name 5  Airex beam tandem stance  -TM      Reps 5  2  -TM      Time 5  30\"  -TM         Exercise 6    Exercise Name 6  Airex split stance  -TM      Reps 6  2  -TM      Time 6  30\"  -TM         Exercise 7    Exercise Name 7  Static stance NBOS/EC  -TM      Reps 7  2  -TM      Time 7  30\"  -TM         Exercise 8    Exercise Name 8  box steps   -TM      Reps 8 " " 10  -TM         Exercise 9    Exercise Name 9  Split stance w/1 LE on 6\" step ball lifts  -TM      Sets 9  2  -TM      Reps 9  10  -TM        User Key  (r) = Recorded By, (t) = Taken By, (c) = Cosigned By    Initials Name Provider Type     Debra Luque PTA Physical Therapy Assistant                         PT OP Goals     Row Name 11/29/18 0900          PT Short Term Goals    STG Date to Achieve  11/28/18  -TM     STG 1  Note a >/= 25% subjective improvement  -TM     STG 1 Progress  Ongoing  -TM     STG 2  Improve Dizziness Handicap Inventory to </= 40  -TM     STG 2 Progress  Ongoing  -TM     STG 3  Negative testing of BPPV  -TM     STG 3 Progress  Ongoing  -TM        Long Term Goals    LTG Date to Achieve  12/19/18  -TM     LTG 1  Independent with HEP  -TM     LTG 1 Progress  Ongoing  -TM     LTG 2  Minimal dizziness with position changes and functional activities  -TM     LTG 2 Progress  Ongoing  -TM     LTG 3  Dizziness Handicap Inventory score </= 25  -TM     LTG 3 Progress  Ongoing  -TM       User Key  (r) = Recorded By, (t) = Taken By, (c) = Cosigned By    Initials Name Provider Type     Debra Luque PTA Physical Therapy Assistant                         Time Calculation:   Start Time: 0849  Stop Time: 0930  Time Calculation (min): 41 min  Total Timed Code Minutes- PT: 41 minute(s)  Therapy Suggested Charges     Code   Minutes Charges    None           Therapy Charges for Today     Code Description Service Date Service Provider Modifiers Qty    61935098691 HC PT THER PROC EA 15 MIN 11/29/2018 Debra Luque PTA GP 3                    Debra Luque PTA  11/29/2018     "

## 2018-12-03 ENCOUNTER — OFFICE VISIT (OUTPATIENT)
Dept: OTOLARYNGOLOGY | Facility: CLINIC | Age: 78
End: 2018-12-03

## 2018-12-03 VITALS — TEMPERATURE: 97.8 F | WEIGHT: 159.6 LBS | HEIGHT: 66 IN | BODY MASS INDEX: 25.65 KG/M2 | HEART RATE: 89 BPM

## 2018-12-03 DIAGNOSIS — R42 DIZZINESS: Primary | ICD-10-CM

## 2018-12-03 DIAGNOSIS — H90.3 SENSORINEURAL HEARING LOSS (SNHL) OF BOTH EARS: ICD-10-CM

## 2018-12-03 PROCEDURE — 99213 OFFICE O/P EST LOW 20 MIN: CPT | Performed by: OTOLARYNGOLOGY

## 2018-12-03 NOTE — PROGRESS NOTES
Subjective   Sara Solis is a 78 y.o. female.   Follow-up dizziness    History of Present Illness     Patient states that she still has some dizziness which she gets up in the morning but she has a vague dizziness during the day is not really affecting her any severe sounds.  She doesn't feel like a therapy helped she denies any change in hearing denies any fluctuating hearing loss tinnitus any other infections.  She's not vomiting  Noticed her hearing is really down even though we found that to be the case  The following portions of the patient's history were reviewed and updated as appropriate: allergies, current medications, past family history, past medical history, past social history, past surgical history and problem list.      Current Outpatient Medications:   •  acetaminophen (TYLENOL) 325 MG tablet, Take 2 tablets by mouth Every 4 (Four) Hours As Needed for Mild Pain , Headache or Fever., Disp: , Rfl:   •  cetirizine (zyrTEC) 10 MG tablet, Take 1 tablet by mouth Every Night., Disp: 30 tablet, Rfl: 11  •  cholecalciferol (VITAMIN D3) 1000 UNITS tablet, Take 1,000 Units by mouth Daily., Disp: , Rfl:   •  fluticasone (FLONASE) 50 MCG/ACT nasal spray, 2 sprays into the nostril(s) as directed by provider Daily., Disp: 1 bottle, Rfl: 11  •  gabapentin (NEURONTIN) 600 MG tablet, 1/2 to one tablet tid prn., Disp: 270 tablet, Rfl: 1  •  Magnesium 100 MG capsule, Take 400 mg by mouth Daily., Disp: , Rfl:   •  meloxicam (MOBIC) 15 MG tablet, Take 1 tablet by mouth Daily., Disp: 21 tablet, Rfl: 0  •  Multiple Vitamins-Minerals (CENTRUM SILVER PO), Take  by mouth Daily., Disp: , Rfl:   •  oxyCODONE-acetaminophen (PERCOCET) 5-325 MG per tablet, Take 1-2 tablets by mouth Every 4 (Four) Hours As Needed for Moderate Pain ., Disp: 24 tablet, Rfl: 0  •  pramipexole (MIRAPEX) 0.5 MG tablet, Take 2 tablets by mouth Every Night., Disp: 180 tablet, Rfl: 3  •  promethazine (PHENERGAN) 25 MG tablet, Take 1 tablet by mouth  Every 6 (Six) Hours As Needed for Nausea or Vomiting., Disp: 30 tablet, Rfl: 0  •  valACYclovir (VALTREX) 1000 MG tablet, Take 1 tablet by mouth Take As Directed., Disp: 30 tablet, Rfl: 11    No Known Allergies          Review of Systems   Constitutional: Negative for fever.   HENT: Negative for ear discharge, ear pain and sore throat.    Neurological: Positive for dizziness. Negative for facial asymmetry.   Hematological: Negative for adenopathy.           Objective   Physical Exam   Constitutional: She is oriented to person, place, and time. She appears well-developed and well-nourished.   HENT:   Head: Normocephalic.   Right Ear: Hearing, tympanic membrane, external ear and ear canal normal.   Left Ear: Hearing, tympanic membrane, external ear and ear canal normal.   Nose: Nose normal.   Mouth/Throat: Uvula is midline, oropharynx is clear and moist and mucous membranes are normal.   Eyes: Conjunctivae, EOM and lids are normal.   Neck: Normal range of motion. No thyromegaly present.   Pulmonary/Chest: Effort normal.   Musculoskeletal: Normal range of motion.   Neurological: She is alert and oriented to person, place, and time. She has normal strength. No cranial nerve deficit or sensory deficit. She displays a negative Romberg sign.   Avis cerebellar function  Normal gait and stance     Skin: Skin is warm.   Psychiatric: She has a normal mood and affect. Thought content normal.        Previous audiogram was reviewed as well as notes from therapist    Assessment/Plan   Diagnoses and all orders for this visit:    Dizziness    Sensorineural hearing loss (SNHL) of both ears    The patient didn't get improvement with the balance therapy and yet her symptoms are somewhat different they're not true vertigo not BPPV M suggesting an ENG be done I explained her we may also need to get her neurologist that we don't have one in the community.  Going to arrange for balance testing as soon as possible and we'll see her back  she's devoid dangerous activity she says the symptoms are not severe right now I warned her about doing climbing her getting in dangerous situation allergic component herself she has a walker if she needs it I    we'll see what the ENG  reveals then plan further therapy

## 2018-12-03 NOTE — PATIENT INSTRUCTIONS

## 2018-12-04 ENCOUNTER — APPOINTMENT (OUTPATIENT)
Dept: PHYSICAL THERAPY | Facility: HOSPITAL | Age: 78
End: 2018-12-04

## 2018-12-06 ENCOUNTER — APPOINTMENT (OUTPATIENT)
Dept: PHYSICAL THERAPY | Facility: HOSPITAL | Age: 78
End: 2018-12-06

## 2018-12-18 ENCOUNTER — DOCUMENTATION (OUTPATIENT)
Dept: PHYSICAL THERAPY | Facility: HOSPITAL | Age: 78
End: 2018-12-18

## 2019-01-16 RX ORDER — GABAPENTIN 600 MG/1
TABLET ORAL
Qty: 270 TABLET | Refills: 0 | Status: SHIPPED | OUTPATIENT
Start: 2019-01-16 | End: 2019-04-22 | Stop reason: SDUPTHER

## 2019-03-18 ENCOUNTER — CLINICAL SUPPORT (OUTPATIENT)
Dept: AUDIOLOGY | Facility: CLINIC | Age: 79
End: 2019-03-18

## 2019-03-18 DIAGNOSIS — R42 DIZZINESS AND GIDDINESS: Primary | ICD-10-CM

## 2019-03-18 PROCEDURE — 92537 CALORIC VSTBLR TEST W/REC: CPT | Performed by: AUDIOLOGIST

## 2019-03-18 PROCEDURE — 92547 SUPPLEMENTAL ELECTRICAL TEST: CPT | Performed by: AUDIOLOGIST

## 2019-03-18 PROCEDURE — 92540 BASIC VESTIBULAR EVALUATION: CPT | Performed by: AUDIOLOGIST

## 2019-03-19 NOTE — PROGRESS NOTES
ELECTRONYSTAGMOGRAPHY (ENG)    Name:  Sara Solis  :  1940  Age:  78 y.o.  Date of Evaluation:  3/19/2019  Referring Provider: No ref. provider found     HISTORY      Reason for visit:  Sara Solis is seen today at the request of Dr. Jimenez Ríos for an evaluation of dizziness.  Patient complains of dizziness which is worse in the morning.  She states she has been having dizziness for several years, and she does not know what started it.  She states she feels off balance when she is looking up.  She states medication doesn't help her dizziness.  Reportedly, she has had an ENG in the past which made her sick following testing.  She states she gets headaches often.      RESULTS     Saccades:  Velocity and latency were Within normal limits, but some abnormal accuracy  Smooth Pursuit:  Cogwheeling recorded during smooth pursuit  Gaze:  No gaze nystagmus recorded or observed  Spontaneous:  Right-beating nystagmus recorded during eyes Left eyes closed  Optokinetics:  Essentially within normal limits and symmetrical  [place]-Hallpike:  Subjective reports of dizziness in all positions, and she felt dizziest during Head Left sitting Up.  No rotary nystagmus recorded or observed.  Positional:  Significant left-beating nystagmus recorded during Supine, Head Right and Right Side-lying positions.  Significant right-beating nystagmus recorded during Head Left and Left Side-lying positions.   Bithermal Calorics:  Caloric Weakness 10% in right ear.  Directional Preponderance 8% towards left.     Visual Fixation Suppression:  Good visual fixation suppression      IMPRESSIONS:  Significant direction-changing positional nystagmus is a non-localizing finding.  The ageotropic nature of the positional nystagmus suggests central vestibular pathology.  Abnormalities seen in saccades and smooth pursuit also suggest central vestibular pathology.    RECOMMENDATIONS:  Test results will be forwarded to Dr. Ríos for his  review.  It was a pleasure seeing Sara Solis in Audiology today.  We would be happy to do further testing or discuss these tests as necessary.            This document has been electronically signed by Ailin Marroquin MS CCC-A on March 19, 2019 11:42 AM       Ailin Marroquin MS CCC-A  Licensed Audiologist

## 2019-03-28 ENCOUNTER — OFFICE VISIT (OUTPATIENT)
Dept: OTOLARYNGOLOGY | Facility: CLINIC | Age: 79
End: 2019-03-28

## 2019-03-28 VITALS — HEIGHT: 64 IN | BODY MASS INDEX: 26.87 KG/M2 | WEIGHT: 157.4 LBS | TEMPERATURE: 98.4 F | OXYGEN SATURATION: 98 %

## 2019-03-28 DIAGNOSIS — H90.3 SENSORINEURAL HEARING LOSS (SNHL) OF BOTH EARS: ICD-10-CM

## 2019-03-28 DIAGNOSIS — H61.23 HEARING LOSS DUE TO CERUMEN IMPACTION, BILATERAL: ICD-10-CM

## 2019-03-28 DIAGNOSIS — R42 DIZZINESS: Primary | ICD-10-CM

## 2019-03-28 PROCEDURE — 69210 REMOVE IMPACTED EAR WAX UNI: CPT | Performed by: OTOLARYNGOLOGY

## 2019-03-28 PROCEDURE — 99213 OFFICE O/P EST LOW 20 MIN: CPT | Performed by: OTOLARYNGOLOGY

## 2019-03-28 NOTE — PROGRESS NOTES
Subjective   Sara Solis is a 78 y.o. female.   Follow-up dizziness    History of Present Illness   The patient comes back in follow-up after having had her balance testing she says she is been doing okay except for the last few days normally she is most is in the morning she moves her head gets up.  She is previously been on vestibular therapy were avoiding vestibular depressants because of her age.  She feels like her hearing is stable but she does feel somewhat stopped up her ear she has no drainage or pain in her ears she denies previous stroke or TIA      The following portions of the patient's history were reviewed and updated as appropriate: allergies, current medications, past family history, past medical history, past social history, past surgical history and problem list.      Current Outpatient Medications:   •  acetaminophen (TYLENOL) 325 MG tablet, Take 2 tablets by mouth Every 4 (Four) Hours As Needed for Mild Pain , Headache or Fever., Disp: , Rfl:   •  cholecalciferol (VITAMIN D3) 1000 UNITS tablet, Take 1,000 Units by mouth Daily., Disp: , Rfl:   •  fluticasone (FLONASE) 50 MCG/ACT nasal spray, 2 sprays into the nostril(s) as directed by provider Daily., Disp: 1 bottle, Rfl: 11  •  gabapentin (NEURONTIN) 600 MG tablet, 1/2 to one tablet tid prn., Disp: 270 tablet, Rfl: 0  •  Magnesium 100 MG capsule, Take 400 mg by mouth Daily., Disp: , Rfl:   •  Multiple Vitamins-Minerals (CENTRUM SILVER PO), Take  by mouth Daily., Disp: , Rfl:   •  oxyCODONE-acetaminophen (PERCOCET) 5-325 MG per tablet, Take 1-2 tablets by mouth Every 4 (Four) Hours As Needed for Moderate Pain ., Disp: 24 tablet, Rfl: 0  •  pramipexole (MIRAPEX) 0.5 MG tablet, Take 2 tablets by mouth Every Night., Disp: 180 tablet, Rfl: 3  •  promethazine (PHENERGAN) 25 MG tablet, Take 1 tablet by mouth Every 6 (Six) Hours As Needed for Nausea or Vomiting., Disp: 30 tablet, Rfl: 0  •  valACYclovir (VALTREX) 1000 MG tablet, Take 1 tablet  by mouth Take As Directed., Disp: 30 tablet, Rfl: 11    No Known Allergies          Review of Systems   Constitutional: Negative for fever.   HENT: Positive for hearing loss. Negative for ear discharge and ear pain.    Eyes: Negative.    Neurological: Positive for dizziness.   Hematological: Negative for adenopathy.           Objective   Physical Exam   Constitutional: She is oriented to person, place, and time. She appears well-developed and well-nourished.   HENT:   Head: Normocephalic.   Right Ear: Hearing, tympanic membrane and external ear normal.   Left Ear: Hearing, tympanic membrane and external ear normal.   Ears:    Nose: Nose normal.   Mouth/Throat: Uvula is midline, oropharynx is clear and moist and mucous membranes are normal.   Eyes: Conjunctivae, EOM and lids are normal.   Neck: Normal range of motion. No thyromegaly present.   Pulmonary/Chest: Effort normal.   Musculoskeletal: Normal range of motion.   Neurological: She is alert and oriented to person, place, and time. She has normal strength. No cranial nerve deficit or sensory deficit. She displays a negative Romberg sign.   Vais cerebellar function  Normal gait and stance     Skin: Skin is warm.   Psychiatric: She has a normal mood and affect. Thought content normal.     ENG results: Testing shows significant direction changing positional nystagmus as well as abnormalities in saccades and smooth pursuit both of which suggest central vestibular pathology          Procedure note: Bilateral cerumen impaction removal cerumen was removed atraumatically without evidence of complication from both ear canals there is a large amount of cerumen in both ear canals no evidence of otitis externa    Assessment/Plan   Sara was seen today for results and dizziness.    Diagnoses and all orders for this visit:    Dizziness    Sensorineural hearing loss (SNHL) of both ears    Hearing loss due to cerumen impaction, bilateral    She felt better after cleaning her  ears out.    The patient's has no evidence of a otologic cause for dizziness and her testing shows central vestibular pathology.    I am suggesting that she see a neurologist and have possibly an MRI  Her  symptoms are very bothersome to the patient especially recently   I do not think she should be on diazepam or any other vestibular suppressants until seen by the neurologist she is already failed response to vestibular therapy I will see her in 6 months to recheck your ears I talked about strategies to minimize the wax in the meantime but I think she needs investigation for central cause stroke or otherwise to explain her symptoms  Warned her about avoiding driving climbing other dangerous activities until this is better controlled.

## 2019-03-28 NOTE — PATIENT INSTRUCTIONS

## 2019-03-29 DIAGNOSIS — H90.3 SENSORINEURAL HEARING LOSS (SNHL) OF BOTH EARS: ICD-10-CM

## 2019-03-29 DIAGNOSIS — R42 DIZZINESS: Primary | ICD-10-CM

## 2019-03-29 DIAGNOSIS — H81.11 BENIGN PAROXYSMAL POSITIONAL VERTIGO OF RIGHT EAR: ICD-10-CM

## 2019-04-17 RX ORDER — GABAPENTIN 600 MG/1
TABLET ORAL
Qty: 270 TABLET | Refills: 0 | OUTPATIENT
Start: 2019-04-17

## 2019-04-19 ENCOUNTER — OFFICE VISIT (OUTPATIENT)
Dept: FAMILY MEDICINE CLINIC | Facility: CLINIC | Age: 79
End: 2019-04-19

## 2019-04-19 VITALS
HEIGHT: 64 IN | SYSTOLIC BLOOD PRESSURE: 140 MMHG | OXYGEN SATURATION: 98 % | DIASTOLIC BLOOD PRESSURE: 80 MMHG | TEMPERATURE: 98 F | HEART RATE: 97 BPM | BODY MASS INDEX: 26.56 KG/M2 | WEIGHT: 155.6 LBS

## 2019-04-19 DIAGNOSIS — L29.9 PRURITIC DERMATITIS: Primary | ICD-10-CM

## 2019-04-19 PROCEDURE — 99213 OFFICE O/P EST LOW 20 MIN: CPT | Performed by: NURSE PRACTITIONER

## 2019-04-19 PROCEDURE — 96372 THER/PROPH/DIAG INJ SC/IM: CPT | Performed by: NURSE PRACTITIONER

## 2019-04-19 RX ORDER — DEXAMETHASONE SODIUM PHOSPHATE 10 MG/ML
10 INJECTION INTRAMUSCULAR; INTRAVENOUS ONCE
Status: DISCONTINUED | OUTPATIENT
Start: 2019-04-19 | End: 2019-04-19

## 2019-04-19 RX ORDER — LEVOCETIRIZINE DIHYDROCHLORIDE 5 MG/1
5 TABLET, FILM COATED ORAL EVERY EVENING
Qty: 30 TABLET | Refills: 1 | Status: SHIPPED | OUTPATIENT
Start: 2019-04-19 | End: 2020-03-13

## 2019-04-19 RX ORDER — PRAMIPEXOLE DIHYDROCHLORIDE 0.5 MG/1
1 TABLET ORAL
COMMUNITY
Start: 2018-08-23 | End: 2019-04-22 | Stop reason: DRUGHIGH

## 2019-04-19 RX ORDER — HYDROCODONE BITARTRATE AND ACETAMINOPHEN 5; 325 MG/1; MG/1
TABLET ORAL
COMMUNITY
End: 2020-01-16 | Stop reason: DRUGHIGH

## 2019-04-19 RX ORDER — AMMONIUM LACTATE 12 G/100G
CREAM TOPICAL
COMMUNITY
Start: 2018-10-10 | End: 2021-04-06

## 2019-04-19 RX ORDER — DEXAMETHASONE SODIUM PHOSPHATE 4 MG/ML
8 INJECTION, SOLUTION INTRA-ARTICULAR; INTRALESIONAL; INTRAMUSCULAR; INTRAVENOUS; SOFT TISSUE ONCE
Status: COMPLETED | OUTPATIENT
Start: 2019-04-19 | End: 2019-04-19

## 2019-04-19 RX ADMIN — DEXAMETHASONE SODIUM PHOSPHATE 8 MG: 4 INJECTION, SOLUTION INTRA-ARTICULAR; INTRALESIONAL; INTRAMUSCULAR; INTRAVENOUS; SOFT TISSUE at 13:16

## 2019-04-19 NOTE — PROGRESS NOTES
Subjective   Sara Solis is a 78 y.o. female. Pruritic rash.     History of Present Illness She is here today for a pruritic rash that she has had since last week. The rash appeared a few days ago and is on her back and stomach. She noticed the rash appeared soon after she had been working outside. She also had two ticks that were removed from each arm. Her grand daughter helped remove them and she said she got the heads of the ticks off. She says the ticks couldn't have been on her for more than a day. She has been taking benadryl at home and said it has helped some with the itching.     The following portions of the patient's history were reviewed and updated as appropriate: allergies, current medications, past family history, past medical history, past social history, past surgical history and problem list.    Review of Systems   Constitutional: Negative.    Respiratory: Negative for chest tightness and shortness of breath.    Cardiovascular: Negative.  Negative for chest pain, palpitations and leg swelling.   Gastrointestinal: Negative.    Musculoskeletal: Negative.  Negative for arthralgias and myalgias.   Skin: Positive for rash. Pallor: uticarial rash on lower back and stomach.       Objective   Physical Exam   Constitutional: She is oriented to person, place, and time. She appears well-developed and well-nourished. No distress.   HENT:   Head: Normocephalic.   Eyes: Conjunctivae and EOM are normal. Pupils are equal, round, and reactive to light.   Neck: Normal range of motion. Neck supple.   Cardiovascular: Normal rate and regular rhythm.   Pulmonary/Chest: Effort normal and breath sounds normal. No respiratory distress.   Neurological: She is alert and oriented to person, place, and time.   Skin: Skin is warm, dry and intact. Rash noted. No abrasion, no bruising, no ecchymosis, no laceration and no lesion noted. Rash is urticarial. She is not diaphoretic. No erythema.   urticarial rash noted on  lower back   Psychiatric: She has a normal mood and affect. Her speech is normal and behavior is normal. Judgment and thought content normal. Cognition and memory are normal.     Vitals:    04/19/19 1210   BP: 140/80   Pulse: 97   Temp: 98 °F (36.7 °C)   SpO2: 98%         Assessment/Plan   Sara was seen today for rash.    Diagnoses and all orders for this visit:    Pruritic dermatitis  -     levocetirizine (XYZAL) 5 MG tablet; Take 1 tablet by mouth Every Evening.  -     Discontinue: dexamethasone (DECADRON) injection 10 mg  -     dexamethasone (DECADRON) injection 8 mg    Pruritic dermatitis- take Xyzal daily at bedtime for itching, dexamethasone 8mg IM injection given at clinic for itching. Seems to be an allergic reaction to something she came in to contact with while working in her yard, she denies any facial swelling, chest tightness, or shortness of breath.  If symptoms do not improve or worsen, patient instructed to return to clinic for further evaluation.         This document has been electronically signed by DEBOARH Garg on  April 19, 2019 1:52 PM

## 2019-04-22 ENCOUNTER — OFFICE VISIT (OUTPATIENT)
Dept: FAMILY MEDICINE CLINIC | Facility: CLINIC | Age: 79
End: 2019-04-22

## 2019-04-22 VITALS
SYSTOLIC BLOOD PRESSURE: 130 MMHG | HEIGHT: 64 IN | WEIGHT: 157.8 LBS | OXYGEN SATURATION: 98 % | HEART RATE: 93 BPM | BODY MASS INDEX: 26.94 KG/M2 | DIASTOLIC BLOOD PRESSURE: 80 MMHG | TEMPERATURE: 97.4 F

## 2019-04-22 DIAGNOSIS — G57.93 NEUROPATHY OF BOTH FEET: Primary | ICD-10-CM

## 2019-04-22 PROCEDURE — 99213 OFFICE O/P EST LOW 20 MIN: CPT | Performed by: FAMILY MEDICINE

## 2019-04-22 RX ORDER — GABAPENTIN 600 MG/1
TABLET ORAL
Qty: 270 TABLET | Refills: 1 | Status: SHIPPED | OUTPATIENT
Start: 2019-04-22 | End: 2020-01-16 | Stop reason: SDUPTHER

## 2019-04-22 NOTE — PROGRESS NOTES
" Subjective   Sara Arlet Solis is a 78 y.o. female.     History of Present Illness     She just needs her gabapentin refilled for neuropathy in feet.   She says would be miserable without the gabapentin  She says she was told her dizziness is from her brain, not her ears.     Review of Systems   Constitutional: Positive for fatigue. Negative for chills and fever.   HENT: Positive for postnasal drip. Negative for congestion, ear discharge, ear pain, facial swelling, hearing loss, rhinorrhea, sinus pressure, sore throat, trouble swallowing and voice change.    Eyes: Negative for discharge, redness and visual disturbance.   Respiratory: Negative for cough, chest tightness, shortness of breath and wheezing.    Cardiovascular: Negative for chest pain and palpitations.   Gastrointestinal: Negative for abdominal pain, blood in stool, constipation, diarrhea, nausea and vomiting.   Endocrine: Negative for polydipsia and polyuria.   Genitourinary: Negative for dysuria, flank pain, hematuria and urgency.   Musculoskeletal: Positive for back pain and myalgias. Negative for arthralgias and joint swelling.   Skin: Positive for rash.   Neurological: Positive for dizziness. Negative for weakness, numbness and headaches.   Hematological: Negative for adenopathy.   Psychiatric/Behavioral: Negative for confusion and sleep disturbance. The patient is not nervous/anxious.            /80 (BP Location: Left arm, Patient Position: Sitting, Cuff Size: Adult)   Pulse 93   Temp 97.4 °F (36.3 °C) (Temporal)   Ht 162.6 cm (64.02\")   Wt 71.6 kg (157 lb 12.8 oz)   SpO2 98%   BMI 27.07 kg/m²       Objective     Physical Exam        PAST MEDICAL HISTORY     Past Medical History:   Diagnosis Date   • Actinic keratosis    • Allergic rhinitis    • Altered bowel function    • Anxiety state    • Aptyalism    • Attention deficit hyperactivity disorder    • Bunion    • Cough    • Degenerative joint disease involving multiple joints    • " Depressive disorder    • Diarrhea    • Disorder of skin    • Fissure in skin    • Foot pain    • Generalized anxiety disorder    • Hepatitis    • Hepatitis C    • Herpes labialis    • History of bone scan 10/01/2008    DXA BONE DENSITY AXIAL 55926 (2) - Osteopenia of the lumbar spine, which has improved compared to the prior study. Normal bone mineral density of the hips, which has improved compared to the prior study   • History of colonoscopy 04/05/2011    Colon endoscopy 31848 (2) - Diverticulosis found in sigmoid colon. Moderate fixation of sigmoid colon. Internal hemorrhoids found in anus.    • History of esophagogastroduodenoscopy 04/05/2011    EGD w/ tube 22086 (1) - Normal hypopharynx, GE junction, duodenum, symmetrical & patent pylorus. Normal esophagus. Dilatation performed. Chronic gastritis found in antrum. Biopsy taken.   • History of mammogram 10/01/2008    Mammogram, both breasts (1) - Negative mammogram. Recommend follow-up in 12 months;     • Incontinence of feces    • Increased frequency of urination    • Insomnia    • Irritable bowel syndrome    • Knee pain    • Lipoma of shoulder    • Low back pain    • Lumbosacral radiculopathy    • Nausea    • Nervous system abnormality     Disorder of the peripheral nervous system     • Osteopenia    • Pain in limb    • Pain in lower limb    • Plantar fasciitis    • Pure hypercholesterolemia    • Restless legs    • Skin lesion     nose   • Spasm    • Spasm of back muscles    • Squamous cell carcinoma of skin    • Stress fracture    • Tear film insufficiency    • Trochanteric bursitis of right hip    • Upper respiratory infection    • Urinary tract infectious disease    • Vertigo       PAST SURGICAL HISTORY     Past Surgical History:   Procedure Laterality Date   • BREAST BIOPSY  12/04/1995    BIOPSY OF BREAST OPEN 19101 (2) - Right,Exscision of mass.Fibroadenoma. Fibrocystic disease, proliferative   • CATARACT EXTRACTION WITH INTRAOCULAR LENS IMPLANT Right  03/07/2006    Remove cataract, insert lens (1) - right   • DILATATION AND CURETTAGE  10/02/1986    D&C (1) - Fractional D&C laparoscopic tubal sterilization. Plus uterine fibroids, approximately 10-12 weeks in size   • FOOT SURGERY  03/20/1990    Foot/toes surgery procedure (1) - Left,Excisional biopsy. Tumor, supposed fibroma, 5th toe   • HIP PINNING Right 12/21/2017    Procedure: HIP PINNING                 (C-ARM#[id number]);  Surgeon: Basil Ulrich MD;  Location: [facility];  Service:    • INJECTION OF MEDICATION  06/08/2015    Kenalog (2) - SERENA Robert   • LIVER BIOPSY  04/26/2000    Needle biopsy of liver (1) - percutaneous liver biopsy;     • OTHER SURGICAL HISTORY  10/17/2002    Chest procedure (2) - Intralesional injection of keloid of chest    • OTHER SURGICAL HISTORY  10/25/2001    Remove axillary lymph nodes (1) - Biopsy, Left axillary adenopathy. 4 lymph nodes with reactive hyperplasia No tumor seen   • OTHER SURGICAL HISTORY  06/17/2013    Biopsy, Each Additional Lesion 78244 (1) - SERENA Robert   • OTHER SURGICAL HISTORY  07/14/2014    Destruction of Premalignant Lesion (1st) 22547 (2)  - SERENA Robert   • OTHER SURGICAL HISTORY  05/10/2016    Drain/Inject Major Joint 10057 (4) - SERENA Robert   • SKIN BIOPSY  07/28/2014    Biopsy of Skin 35731 (2)  - SERENA Robert   • TOTAL ABDOMINAL HYSTERECTOMY WITH SALPINGO OOPHORECTOMY  09/25/1990    incidental appendectomy.Uterine fibroids      SOCIAL HISTORY     Social History     Socioeconomic History   • Marital status:      Spouse name: Not on file   • Number of children: Not on file   • Years of education: Not on file   • Highest education level: Not on file   Tobacco Use   • Smoking status: Never Smoker   • Smokeless tobacco: Never Used   Substance and Sexual Activity   • Alcohol use: No   • Drug use: No   • Sexual activity: No      ALLERGIES   Patient has no known allergies.   MEDICATIONS     Current Outpatient Medications   Medication Sig Dispense Refill   •  cholecalciferol (VITAMIN D3) 1000 UNITS tablet Take 1,000 Units by mouth Daily.     • fluticasone (FLONASE) 50 MCG/ACT nasal spray 2 sprays into the nostril(s) as directed by provider Daily. 1 bottle 11   • gabapentin (NEURONTIN) 600 MG tablet 1/2 to one tablet tid prn. 270 tablet 1   • levocetirizine (XYZAL) 5 MG tablet Take 1 tablet by mouth Every Evening. 30 tablet 1   • Magnesium 100 MG capsule Take 400 mg by mouth Daily.     • Multiple Vitamins-Minerals (CENTRUM SILVER PO) Take  by mouth Daily.     • pramipexole (MIRAPEX) 0.5 MG tablet Take 2 tablets by mouth Every Night. 180 tablet 3   • promethazine (PHENERGAN) 25 MG tablet Take 1 tablet by mouth Every 6 (Six) Hours As Needed for Nausea or Vomiting. 30 tablet 0   • valACYclovir (VALTREX) 1000 MG tablet Take 1 tablet by mouth Take As Directed. 30 tablet 11   • acetaminophen (TYLENOL) 325 MG tablet Take 2 tablets by mouth Every 4 (Four) Hours As Needed for Mild Pain , Headache or Fever.     • ammonium lactate (AMLACTIN) 12 % cream      • HYDROcodone-acetaminophen (NORCO) 5-325 MG per tablet hydrocodone 5 mg-acetaminophen 325 mg tablet     • oxyCODONE-acetaminophen (PERCOCET) 5-325 MG per tablet Take 1-2 tablets by mouth Every 4 (Four) Hours As Needed for Moderate Pain . 24 tablet 0     No current facility-administered medications for this visit.         The following portions of the patient's history were reviewed and updated as appropriate: allergies, current medications, past family history, past medical history, past social history, past surgical history and problem list.        Assessment/Plan   Sara was seen today for med refill.    Diagnoses and all orders for this visit:    Neuropathy of both feet    Other orders  -     gabapentin (NEURONTIN) 600 MG tablet; 1/2 to one tablet tid prn.      Fatigue a lot from sedating effects of meds.  She is aware.                 No Follow-up on file.                  This document has been electronically signed by  Sulaiman Robert MD on April 22, 2019 4:17 PM

## 2019-04-22 NOTE — PATIENT INSTRUCTIONS

## 2019-05-31 ENCOUNTER — OFFICE VISIT (OUTPATIENT)
Dept: FAMILY MEDICINE CLINIC | Facility: CLINIC | Age: 79
End: 2019-05-31

## 2019-05-31 VITALS
SYSTOLIC BLOOD PRESSURE: 140 MMHG | HEIGHT: 64 IN | BODY MASS INDEX: 26.63 KG/M2 | DIASTOLIC BLOOD PRESSURE: 62 MMHG | HEART RATE: 85 BPM | WEIGHT: 156 LBS | OXYGEN SATURATION: 98 % | TEMPERATURE: 98.6 F

## 2019-05-31 DIAGNOSIS — K59.00 CONSTIPATION, UNSPECIFIED CONSTIPATION TYPE: ICD-10-CM

## 2019-05-31 DIAGNOSIS — R19.7 DIARRHEA, UNSPECIFIED TYPE: ICD-10-CM

## 2019-05-31 DIAGNOSIS — R30.0 DYSURIA: ICD-10-CM

## 2019-05-31 DIAGNOSIS — R10.30 LOWER ABDOMINAL PAIN: ICD-10-CM

## 2019-05-31 DIAGNOSIS — J30.2 SEASONAL ALLERGIC RHINITIS, UNSPECIFIED TRIGGER: Primary | ICD-10-CM

## 2019-05-31 LAB
BILIRUB BLD-MCNC: NEGATIVE MG/DL
CLARITY, POC: ABNORMAL
COLOR UR: YELLOW
GLUCOSE UR STRIP-MCNC: NEGATIVE MG/DL
KETONES UR QL: NEGATIVE
LEUKOCYTE EST, POC: NEGATIVE
NITRITE UR-MCNC: NEGATIVE MG/ML
PH UR: 8 [PH] (ref 5–8)
PROT UR STRIP-MCNC: NEGATIVE MG/DL
RBC # UR STRIP: NEGATIVE /UL
SP GR UR: 1.01 (ref 1–1.03)
UROBILINOGEN UR QL: NORMAL

## 2019-05-31 PROCEDURE — 99214 OFFICE O/P EST MOD 30 MIN: CPT | Performed by: NURSE PRACTITIONER

## 2019-05-31 PROCEDURE — 81002 URINALYSIS NONAUTO W/O SCOPE: CPT | Performed by: NURSE PRACTITIONER

## 2019-05-31 RX ORDER — FLUTICASONE PROPIONATE 50 MCG
2 SPRAY, SUSPENSION (ML) NASAL DAILY
Qty: 1 BOTTLE | Refills: 2 | Status: SHIPPED | OUTPATIENT
Start: 2019-05-31

## 2019-05-31 RX ORDER — POLYETHYLENE GLYCOL 3350 17 G/17G
17 POWDER, FOR SOLUTION ORAL DAILY
Qty: 10 PACKET | Refills: 0 | Status: SHIPPED | OUTPATIENT
Start: 2019-05-31 | End: 2019-06-10

## 2019-05-31 RX ORDER — NAPROXEN SODIUM 220 MG
220 TABLET ORAL 2 TIMES DAILY PRN
COMMUNITY
End: 2021-07-19

## 2019-08-19 RX ORDER — PRAMIPEXOLE DIHYDROCHLORIDE 0.5 MG/1
1 TABLET ORAL NIGHTLY
Qty: 180 TABLET | Refills: 3 | Status: SHIPPED | OUTPATIENT
Start: 2019-08-19 | End: 2020-05-14 | Stop reason: SDUPTHER

## 2019-10-07 ENCOUNTER — OFFICE VISIT (OUTPATIENT)
Dept: OTOLARYNGOLOGY | Facility: CLINIC | Age: 79
End: 2019-10-07

## 2019-10-07 ENCOUNTER — CLINICAL SUPPORT (OUTPATIENT)
Dept: AUDIOLOGY | Facility: CLINIC | Age: 79
End: 2019-10-07

## 2019-10-07 VITALS — WEIGHT: 159 LBS | TEMPERATURE: 97.9 F | BODY MASS INDEX: 27.14 KG/M2 | HEIGHT: 64 IN

## 2019-10-07 DIAGNOSIS — H90.3 SENSORINEURAL HEARING LOSS (SNHL) OF BOTH EARS: Primary | ICD-10-CM

## 2019-10-07 DIAGNOSIS — H90.3 SENSORINEURAL HEARING LOSS, BILATERAL: Primary | ICD-10-CM

## 2019-10-07 PROCEDURE — 99213 OFFICE O/P EST LOW 20 MIN: CPT | Performed by: OTOLARYNGOLOGY

## 2019-10-07 NOTE — PROGRESS NOTES
Subjective   Sara Solis is a 78 y.o. female.   Follow-up for hearing and balance issues    History of Present Illness   Has balance problems do not appear to be vertigo related  but postural hypotension type.  She states her hearing is stable.  Patient's not had any major change in her hearing    The following portions of the patient's history were reviewed and updated as appropriate: allergies, current medications, past family history, past medical history, past social history, past surgical history and problem list.      Current Outpatient Medications:   •  acetaminophen (TYLENOL) 325 MG tablet, Take 2 tablets by mouth Every 4 (Four) Hours As Needed for Mild Pain , Headache or Fever., Disp: , Rfl:   •  ammonium lactate (AMLACTIN) 12 % cream, , Disp: , Rfl:   •  cholecalciferol (VITAMIN D3) 1000 UNITS tablet, Take 1,000 Units by mouth Daily., Disp: , Rfl:   •  fluticasone (FLONASE) 50 MCG/ACT nasal spray, 2 sprays into the nostril(s) as directed by provider Daily., Disp: 1 bottle, Rfl: 2  •  gabapentin (NEURONTIN) 600 MG tablet, 1/2 to one tablet tid prn., Disp: 270 tablet, Rfl: 1  •  HYDROcodone-acetaminophen (NORCO) 5-325 MG per tablet, hydrocodone 5 mg-acetaminophen 325 mg tablet, Disp: , Rfl:   •  levocetirizine (XYZAL) 5 MG tablet, Take 1 tablet by mouth Every Evening., Disp: 30 tablet, Rfl: 1  •  Magnesium 100 MG capsule, Take 400 mg by mouth Daily., Disp: , Rfl:   •  Multiple Vitamins-Minerals (CENTRUM SILVER PO), Take  by mouth Daily., Disp: , Rfl:   •  naproxen sodium (ALEVE) 220 MG tablet, Take 220 mg by mouth 2 (Two) Times a Day As Needed., Disp: , Rfl:   •  oxyCODONE-acetaminophen (PERCOCET) 5-325 MG per tablet, Take 1-2 tablets by mouth Every 4 (Four) Hours As Needed for Moderate Pain ., Disp: 24 tablet, Rfl: 0  •  pramipexole (MIRAPEX) 0.5 MG tablet, TAKE 2 TABLETS BY MOUTH EVERY NIGHT., Disp: 180 tablet, Rfl: 3  •  promethazine (PHENERGAN) 25 MG tablet, Take 1 tablet by mouth Every 6 (Six)  Hours As Needed for Nausea or Vomiting., Disp: 30 tablet, Rfl: 0  •  valACYclovir (VALTREX) 1000 MG tablet, Take 1 tablet by mouth Take As Directed., Disp: 30 tablet, Rfl: 11    No Known Allergies          Review of Systems   Constitutional: Negative for fever.   HENT: Negative for ear discharge and ear pain.    Neurological: Positive for dizziness.        Problems balance           Objective   Physical Exam   Constitutional: She is oriented to person, place, and time. She appears well-developed and well-nourished.   HENT:   Head: Normocephalic.   Right Ear: Tympanic membrane and external ear normal.   Left Ear: Tympanic membrane and external ear normal.   Ears:    Nose: Nose normal.   Mouth/Throat: Uvula is midline, oropharynx is clear and moist and mucous membranes are normal.   Eyes: Conjunctivae, EOM and lids are normal.   Neck: Normal range of motion. No thyromegaly present.   Pulmonary/Chest: Effort normal.   Musculoskeletal: Normal range of motion.   Neurological: She is alert and oriented to person, place, and time. She has normal strength. No cranial nerve deficit or sensory deficit. She displays a negative Romberg sign.   Avis cerebellar function  Normal gait and stance     Skin: Skin is warm.   Psychiatric: She has a normal mood and affect. Thought content normal.       Audiogram was reviewed the patient actual tracing showing sensorineural hearing loss no evidence of fluid she understands and all the test were compared    Assessment/Plan   Sara was seen today for follow-up.    Diagnoses and all orders for this visit:    Sensorineural hearing loss (SNHL) of both ears    Postural hypotension no BPPV but when she gets up from lying to sitting or sitting to standing she feels lightheaded.  She says she does better as the days goes on she uses a cane  Tried to prescribe hearing aids but she is not interested in that this point     urged her to see her primary physician regarding check her heart and blood  pressure this is not like the BPPV she is had she says her hearing stable reviewed her hearing test she does not want consider any change in hearing aids were

## 2019-10-07 NOTE — PATIENT INSTRUCTIONS

## 2019-10-07 NOTE — PROGRESS NOTES
STANDARD AUDIOMETRIC EVALUATION      Name:  Sara Solis  :  1940  Age:  78 y.o.  Date of Evaluation:  10/7/2019      HISTORY    Reason for visit:  Sara Solis is seen today for a hearing test at the request of Dr. Jimenez Ríos.  Patient reports she still gets dizzy.  She states she has not noticed any changes in her hearing.       EVALUATION    See Audiogram    RESULTS        Otoscopy and Tympanometry 226 Hz :  Right Ear:  Otoscopy:  Clear ear canal          Tympanometry:  Middle ear function within normal limits    Left Ear:   Otoscopy:  Clear ear canal        Tympanometry:  Middle ear function within normal limits    Test technique:  Standard Audiometry     Pure Tone Audiometry:   Patient responded to pure tones at 10-80 dB for 250-8000 Hz in both ears.      Speech Audiometry:        Right Ear:  Speech Reception Threshold (SRT) was obtained at 20 dBHL                 Speech Discrimination scores were 96% in quiet when words were presented at 60 dBHL       Left Ear:  Speech Reception Threshold (SRT) was obtained at 10 dBHL                 Speech Discrimination scores were 60% in quiet when words were presented at 55 dBHL    Reliability:   good    IMPRESSIONS:  1.  Tympanometry results are consistent with Middle ear function within normal limits in both ears.  2.  Pure tone results are consistent with within normal limits to severe sloping sensorineural hearing loss for both ears.       RECOMMENDATIONS:  Patient is seeing the Ear Nose and Throat physician immediately following this examination.  It was a pleasure seeing Sara Solis in Audiology today.  We would be happy to do further testing or discuss these test as necessary.          This document has been electronically signed by Ailin Marroquin MS CCC-TRACEY on 2019 3:38 PM       Ailin Marroquin MS CCC-A  Licensed Audiologist

## 2020-01-13 RX ORDER — GABAPENTIN 600 MG/1
TABLET ORAL
Qty: 270 TABLET | Refills: 1 | OUTPATIENT
Start: 2020-01-13

## 2020-01-15 NOTE — TELEPHONE ENCOUNTER
CALLED AND LET PT KNOW SHE HAD TO BE SEEN IN ORDER TO GET MEDICATION REFILL SHE STATED SHE WOULD CALL LAT TO SCHEDULE  AN APPOINTMENT.

## 2020-01-16 ENCOUNTER — APPOINTMENT (OUTPATIENT)
Dept: LAB | Facility: HOSPITAL | Age: 80
End: 2020-01-16

## 2020-01-16 ENCOUNTER — OFFICE VISIT (OUTPATIENT)
Dept: FAMILY MEDICINE CLINIC | Facility: CLINIC | Age: 80
End: 2020-01-16

## 2020-01-16 VITALS
HEIGHT: 64 IN | SYSTOLIC BLOOD PRESSURE: 130 MMHG | WEIGHT: 157.4 LBS | BODY MASS INDEX: 26.87 KG/M2 | TEMPERATURE: 97.2 F | HEART RATE: 101 BPM | OXYGEN SATURATION: 96 % | DIASTOLIC BLOOD PRESSURE: 58 MMHG

## 2020-01-16 DIAGNOSIS — F32.1 CURRENT MODERATE EPISODE OF MAJOR DEPRESSIVE DISORDER WITHOUT PRIOR EPISODE (HCC): ICD-10-CM

## 2020-01-16 DIAGNOSIS — G57.93 NEUROPATHY OF BOTH FEET: ICD-10-CM

## 2020-01-16 DIAGNOSIS — Z00.00 ANNUAL PHYSICAL EXAM: Primary | ICD-10-CM

## 2020-01-16 PROCEDURE — 85027 COMPLETE CBC AUTOMATED: CPT | Performed by: FAMILY MEDICINE

## 2020-01-16 PROCEDURE — 99214 OFFICE O/P EST MOD 30 MIN: CPT | Performed by: FAMILY MEDICINE

## 2020-01-16 PROCEDURE — 80061 LIPID PANEL: CPT | Performed by: FAMILY MEDICINE

## 2020-01-16 PROCEDURE — 80053 COMPREHEN METABOLIC PANEL: CPT | Performed by: FAMILY MEDICINE

## 2020-01-16 PROCEDURE — 84443 ASSAY THYROID STIM HORMONE: CPT | Performed by: FAMILY MEDICINE

## 2020-01-16 RX ORDER — HYDROCODONE BITARTRATE AND ACETAMINOPHEN 7.5; 325 MG/1; MG/1
1 TABLET ORAL EVERY 8 HOURS PRN
Qty: 30 TABLET | Refills: 0 | Status: SHIPPED | OUTPATIENT
Start: 2020-01-16 | End: 2020-05-14 | Stop reason: SDUPTHER

## 2020-01-16 RX ORDER — HYDROCODONE BITARTRATE AND ACETAMINOPHEN 7.5; 325 MG/1; MG/1
1 TABLET ORAL EVERY 8 HOURS PRN
COMMUNITY
End: 2020-01-16 | Stop reason: SDUPTHER

## 2020-01-16 RX ORDER — PROMETHAZINE HYDROCHLORIDE 25 MG/1
25 TABLET ORAL EVERY 6 HOURS PRN
Qty: 30 TABLET | Refills: 0 | Status: SHIPPED | OUTPATIENT
Start: 2020-01-16 | End: 2020-07-20

## 2020-01-16 RX ORDER — GABAPENTIN 600 MG/1
TABLET ORAL
Qty: 270 TABLET | Refills: 1 | Status: SHIPPED | OUTPATIENT
Start: 2020-01-16 | End: 2020-06-02 | Stop reason: SDUPTHER

## 2020-01-16 NOTE — PROGRESS NOTES
" Subjective   Sara Solis is a 79 y.o. female.     History of Present Illness     Here for refill of pain meds  Hasn't had lab work in some time  Also,  Crying all the time several months.  Sister takes zoloft and wants to try it.  Not suicidal  Patient has restless legs.   She wants refill on hemorrhoid cream    Review of Systems   Constitutional: Negative for chills, fatigue and fever.   HENT: Negative for congestion, ear discharge, ear pain, facial swelling, hearing loss, postnasal drip, rhinorrhea, sinus pressure, sore throat, trouble swallowing and voice change.    Eyes: Negative for discharge, redness and visual disturbance.   Respiratory: Negative for cough, chest tightness, shortness of breath and wheezing.    Cardiovascular: Negative for chest pain and palpitations.   Gastrointestinal: Negative for abdominal pain, blood in stool, constipation, diarrhea, nausea and vomiting.   Endocrine: Negative for polydipsia and polyuria.   Genitourinary: Negative for dysuria, flank pain, hematuria and urgency.   Musculoskeletal: Negative for arthralgias, back pain, joint swelling and myalgias.   Skin: Negative for rash.   Neurological: Negative for dizziness, weakness, numbness and headaches.   Hematological: Negative for adenopathy.   Psychiatric/Behavioral: Positive for dysphoric mood. Negative for confusion and sleep disturbance. The patient is not nervous/anxious.            /58 (BP Location: Left arm, Patient Position: Sitting, Cuff Size: Adult)   Pulse 101   Temp 97.2 °F (36.2 °C) (Temporal)   Ht 162.6 cm (64.02\")   Wt 71.4 kg (157 lb 6.4 oz)   SpO2 96%   BMI 27.00 kg/m²       Objective     Physical Exam   Constitutional: She is oriented to person, place, and time. She appears well-developed and well-nourished.   HENT:   Head: Normocephalic and atraumatic.   Right Ear: External ear normal.   Left Ear: External ear normal.   Nose: Nose normal.   Eyes: Pupils are equal, round, and reactive to " light. Conjunctivae and EOM are normal.   Neck: Normal range of motion.   Cardiovascular: Normal rate, regular rhythm and normal heart sounds.   Pulmonary/Chest: Effort normal and breath sounds normal.   Abdominal: She exhibits no distension.   Musculoskeletal: Normal range of motion.   Neurological: She is alert and oriented to person, place, and time.   Psychiatric: She has a normal mood and affect. Her behavior is normal. Judgment and thought content normal.   Nursing note and vitals reviewed.          PAST MEDICAL HISTORY     Past Medical History:   Diagnosis Date   • Actinic keratosis    • Allergic rhinitis    • Altered bowel function    • Anxiety state    • Aptyalism    • Attention deficit hyperactivity disorder    • Bunion    • Cough    • Degenerative joint disease involving multiple joints    • Depressive disorder    • Diarrhea    • Disorder of skin    • Fissure in skin    • Foot pain    • Generalized anxiety disorder    • Hepatitis    • Hepatitis C    • Herpes labialis    • History of bone scan 10/01/2008    DXA BONE DENSITY AXIAL 85873 (2) - Osteopenia of the lumbar spine, which has improved compared to the prior study. Normal bone mineral density of the hips, which has improved compared to the prior study   • History of colonoscopy 04/05/2011    Colon endoscopy 59714 (2) - Diverticulosis found in sigmoid colon. Moderate fixation of sigmoid colon. Internal hemorrhoids found in anus.    • History of esophagogastroduodenoscopy 04/05/2011    EGD w/ tube 14362 (1) - Normal hypopharynx, GE junction, duodenum, symmetrical & patent pylorus. Normal esophagus. Dilatation performed. Chronic gastritis found in antrum. Biopsy taken.   • History of mammogram 10/01/2008    Mammogram, both breasts (1) - Negative mammogram. Recommend follow-up in 12 months;     • Incontinence of feces    • Increased frequency of urination    • Insomnia    • Irritable bowel syndrome    • Knee pain    • Lipoma of shoulder    • Low back pain     • Lumbosacral radiculopathy    • Nausea    • Nervous system abnormality     Disorder of the peripheral nervous system     • Osteopenia    • Pain in limb    • Pain in lower limb    • Plantar fasciitis    • Pure hypercholesterolemia    • Restless legs    • Skin lesion     nose   • Spasm    • Spasm of back muscles    • Squamous cell carcinoma of skin    • Stress fracture    • Tear film insufficiency    • Trochanteric bursitis of right hip    • Upper respiratory infection    • Urinary tract infectious disease    • Vertigo       PAST SURGICAL HISTORY     Past Surgical History:   Procedure Laterality Date   • BREAST BIOPSY  12/04/1995    BIOPSY OF BREAST OPEN 68351 (2) - Right,Exscision of mass.Fibroadenoma. Fibrocystic disease, proliferative   • CATARACT EXTRACTION WITH INTRAOCULAR LENS IMPLANT Right 03/07/2006    Remove cataract, insert lens (1) - right   • DILATATION AND CURETTAGE  10/02/1986    D&C (1) - Fractional D&C laparoscopic tubal sterilization. Plus uterine fibroids, approximately 10-12 weeks in size   • FOOT SURGERY  03/20/1990    Foot/toes surgery procedure (1) - Left,Excisional biopsy. Tumor, supposed fibroma, 5th toe   • HIP PINNING Right 12/21/2017    Procedure: HIP PINNING                 (C-ARM#3);  Surgeon: Basil Ulrich MD;  Location: [facility];  Service:    • INJECTION OF MEDICATION  06/08/2015    Kenalog (2) - SERENA Robert   • LIVER BIOPSY  04/26/2000    Needle biopsy of liver (1) - percutaneous liver biopsy;     • OTHER SURGICAL HISTORY  10/17/2002    Chest procedure (2) - Intralesional injection of keloid of chest    • OTHER SURGICAL HISTORY  10/25/2001    Remove axillary lymph nodes (1) - Biopsy, Left axillary adenopathy. 4 lymph nodes with reactive hyperplasia No tumor seen   • OTHER SURGICAL HISTORY  06/17/2013    Biopsy, Each Additional Lesion 84145 (1) - SERENA Robert   • OTHER SURGICAL HISTORY  07/14/2014    Destruction of Premalignant Lesion (1st) 70168 (2)  - SERENA Robert   • OTHER SURGICAL  HISTORY  05/10/2016    Drain/Inject Major Joint 03924 (4) - SERENA Robert   • SKIN BIOPSY  07/28/2014    Biopsy of Skin 64256 (2)  - SERENA Robert   • TOTAL ABDOMINAL HYSTERECTOMY WITH SALPINGO OOPHORECTOMY  09/25/1990    incidental appendectomy.Uterine fibroids      SOCIAL HISTORY     Social History     Socioeconomic History   • Marital status:      Spouse name: Not on file   • Number of children: Not on file   • Years of education: Not on file   • Highest education level: Not on file   Tobacco Use   • Smoking status: Never Smoker   • Smokeless tobacco: Never Used   Substance and Sexual Activity   • Alcohol use: No   • Drug use: No   • Sexual activity: Never      ALLERGIES   Patient has no known allergies.   MEDICATIONS     Current Outpatient Medications   Medication Sig Dispense Refill   • ammonium lactate (AMLACTIN) 12 % cream      • cholecalciferol (VITAMIN D3) 1000 UNITS tablet Take 1,000 Units by mouth Daily.     • fluticasone (FLONASE) 50 MCG/ACT nasal spray 2 sprays into the nostril(s) as directed by provider Daily. 1 bottle 2   • gabapentin (NEURONTIN) 600 MG tablet 1/2 to one tablet tid prn. 270 tablet 1   • HYDROcodone-acetaminophen (NORCO) 7.5-325 MG per tablet Take 1 tablet by mouth Every 8 (Eight) Hours As Needed for Moderate Pain . 30 tablet 0   • Magnesium 100 MG capsule Take 400 mg by mouth Daily.     • Multiple Vitamins-Minerals (CENTRUM SILVER PO) Take  by mouth Daily.     • naproxen sodium (ALEVE) 220 MG tablet Take 220 mg by mouth 2 (Two) Times a Day As Needed.     • pramipexole (MIRAPEX) 0.5 MG tablet TAKE 2 TABLETS BY MOUTH EVERY NIGHT. 180 tablet 3   • promethazine (PHENERGAN) 25 MG tablet Take 1 tablet by mouth Every 6 (Six) Hours As Needed for Nausea or Vomiting. 30 tablet 0   • levocetirizine (XYZAL) 5 MG tablet Take 1 tablet by mouth Every Evening. 30 tablet 1   • lidocaine-hydrocortisone 3-0.5 % cream rectal cream Insert 1 application into the rectum 2 (Two) Times a Day As Needed  (HEMORRHOID). 1 tube 11   • sertraline (ZOLOFT) 50 MG tablet Take 0.5-1 tablets by mouth Daily. 90 tablet 3   • valACYclovir (VALTREX) 1000 MG tablet Take 1 tablet by mouth Take As Directed. 30 tablet 11     No current facility-administered medications for this visit.         The following portions of the patient's history were reviewed and updated as appropriate: allergies, current medications, past family history, past medical history, past social history, past surgical history and problem list.        Assessment/Plan   Sara was seen today for med refill.    Diagnoses and all orders for this visit:    Annual physical exam  -     CBC (No Diff)  -     Comprehensive Metabolic Panel  -     TSH  -     Lipid Panel    Neuropathy of both feet  -     TSH  -     HYDROcodone-acetaminophen (NORCO) 7.5-325 MG per tablet; Take 1 tablet by mouth Every 8 (Eight) Hours As Needed for Moderate Pain .  -     gabapentin (NEURONTIN) 600 MG tablet; 1/2 to one tablet tid prn.    Current moderate episode of major depressive disorder without prior episode (CMS/HCC)    Other orders  -     promethazine (PHENERGAN) 25 MG tablet; Take 1 tablet by mouth Every 6 (Six) Hours As Needed for Nausea or Vomiting.  -     sertraline (ZOLOFT) 50 MG tablet; Take 0.5-1 tablets by mouth Daily.  -     lidocaine-hydrocortisone 3-0.5 % cream rectal cream; Insert 1 application into the rectum 2 (Two) Times a Day As Needed (HEMORRHOID).      Discussed zoloft what to expect, etc.   Stop if suicidal thinking.     Warned zoloft make restless legs worse             No follow-ups on file.                  This document has been electronically signed by Sulaiman Robert MD on January 16, 2020 12:44 PM

## 2020-01-17 LAB
ALBUMIN SERPL-MCNC: 4.3 G/DL (ref 3.5–5.2)
ALBUMIN/GLOB SERPL: 1.4 G/DL
ALP SERPL-CCNC: 51 U/L (ref 39–117)
ALT SERPL W P-5'-P-CCNC: 15 U/L (ref 1–33)
ANION GAP SERPL CALCULATED.3IONS-SCNC: 15.4 MMOL/L (ref 5–15)
AST SERPL-CCNC: 21 U/L (ref 1–32)
BILIRUB SERPL-MCNC: <0.2 MG/DL (ref 0.2–1.2)
BUN BLD-MCNC: 21 MG/DL (ref 8–23)
BUN/CREAT SERPL: 27.3 (ref 7–25)
CALCIUM SPEC-SCNC: 9.4 MG/DL (ref 8.6–10.5)
CHLORIDE SERPL-SCNC: 98 MMOL/L (ref 98–107)
CHOLEST SERPL-MCNC: 237 MG/DL (ref 0–200)
CO2 SERPL-SCNC: 26.6 MMOL/L (ref 22–29)
CREAT BLD-MCNC: 0.77 MG/DL (ref 0.57–1)
DEPRECATED RDW RBC AUTO: 40.1 FL (ref 37–54)
ERYTHROCYTE [DISTWIDTH] IN BLOOD BY AUTOMATED COUNT: 12.9 % (ref 12.3–15.4)
GFR SERPL CREATININE-BSD FRML MDRD: 72 ML/MIN/1.73
GLOBULIN UR ELPH-MCNC: 3.1 GM/DL
GLUCOSE BLD-MCNC: 133 MG/DL (ref 65–99)
HCT VFR BLD AUTO: 37.7 % (ref 34–46.6)
HDLC SERPL-MCNC: 60 MG/DL (ref 40–60)
HGB BLD-MCNC: 13 G/DL (ref 12–15.9)
LDLC SERPL CALC-MCNC: 147 MG/DL (ref 0–100)
LDLC/HDLC SERPL: 2.45 {RATIO}
MCH RBC QN AUTO: 29.7 PG (ref 26.6–33)
MCHC RBC AUTO-ENTMCNC: 34.5 G/DL (ref 31.5–35.7)
MCV RBC AUTO: 86.3 FL (ref 79–97)
PLATELET # BLD AUTO: 282 10*3/MM3 (ref 140–450)
PMV BLD AUTO: 10.6 FL (ref 6–12)
POTASSIUM BLD-SCNC: 4.3 MMOL/L (ref 3.5–5.2)
PROT SERPL-MCNC: 7.4 G/DL (ref 6–8.5)
RBC # BLD AUTO: 4.37 10*6/MM3 (ref 3.77–5.28)
SODIUM BLD-SCNC: 140 MMOL/L (ref 136–145)
TRIGL SERPL-MCNC: 151 MG/DL (ref 0–150)
TSH SERPL DL<=0.05 MIU/L-ACNC: 4.13 UIU/ML (ref 0.27–4.2)
VLDLC SERPL-MCNC: 30.2 MG/DL (ref 5–40)
WBC NRBC COR # BLD: 12.77 10*3/MM3 (ref 3.4–10.8)

## 2020-01-29 ENCOUNTER — TELEPHONE (OUTPATIENT)
Dept: FAMILY MEDICINE CLINIC | Facility: CLINIC | Age: 80
End: 2020-01-29

## 2020-01-29 DIAGNOSIS — Z12.31 SCREENING MAMMOGRAM, ENCOUNTER FOR: Primary | ICD-10-CM

## 2020-01-29 DIAGNOSIS — Z12.31 OTHER SCREENING MAMMOGRAM: ICD-10-CM

## 2020-01-29 NOTE — TELEPHONE ENCOUNTER
"[facility] CALLED AND SAID THEY CAN'T TAKE THAT ORDER.  SHE SAID IN THE PAST YOU HAVE SENT \"MAMMO SCREENING BILATERAL WITH CAD.\"  "

## 2020-01-29 NOTE — TELEPHONE ENCOUNTER
Patients granddaughter (on RO) called in to see if Dr. Robert can schedule the patients mammogram at the [facility] in [place], [place]. Call back number is [phone]

## 2020-01-29 NOTE — TELEPHONE ENCOUNTER
PATIENT WANTS ORDER FOR MAMMOGRAM SENT TO [facility].  THEY DON'T DO THE SAME ONE THAT [facility] DOES.

## 2020-03-13 ENCOUNTER — OFFICE VISIT (OUTPATIENT)
Dept: FAMILY MEDICINE CLINIC | Facility: CLINIC | Age: 80
End: 2020-03-13

## 2020-03-13 VITALS
HEIGHT: 64 IN | DIASTOLIC BLOOD PRESSURE: 78 MMHG | WEIGHT: 158.8 LBS | SYSTOLIC BLOOD PRESSURE: 130 MMHG | HEART RATE: 79 BPM | OXYGEN SATURATION: 93 % | BODY MASS INDEX: 27.11 KG/M2 | TEMPERATURE: 98.4 F

## 2020-03-13 DIAGNOSIS — J30.9 ALLERGIC RHINITIS, UNSPECIFIED SEASONALITY, UNSPECIFIED TRIGGER: ICD-10-CM

## 2020-03-13 DIAGNOSIS — H10.231 SEROUS CONJUNCTIVITIS OF RIGHT EYE: Primary | ICD-10-CM

## 2020-03-13 PROCEDURE — 99213 OFFICE O/P EST LOW 20 MIN: CPT | Performed by: NURSE PRACTITIONER

## 2020-03-13 RX ORDER — POLYMYXIN B SULFATE AND TRIMETHOPRIM 1; 10000 MG/ML; [USP'U]/ML
1 SOLUTION OPHTHALMIC EVERY 4 HOURS
Qty: 10 ML | Refills: 0 | Status: SHIPPED | OUTPATIENT
Start: 2020-03-13 | End: 2023-03-22 | Stop reason: SDUPTHER

## 2020-03-13 RX ORDER — LORATADINE 10 MG/1
10 TABLET ORAL DAILY
Qty: 30 TABLET | Refills: 11 | Status: SHIPPED | OUTPATIENT
Start: 2020-03-13

## 2020-03-13 NOTE — PROGRESS NOTES
Subjective   Sara Solis is a 79 y.o. female. Conjunctivitis     History of Present Illness   Conjunctivitis  Patient presents for evaluation of erythema, itching and tearing in the right eye. She has noticed the above symptoms for a few days.  Onset was gradual. Patient denies blurred vision, discharge, foreign body sensation, pain, photophobia and visual field deficit. There is a history of allergies. Along with eye symtoms she says she is also experiencing nasal congestion and sneezing. Pt currently not taking any allergy medication. She tried allergy eye drops but says they didn't provide much relief.     The following portions of the patient's history were reviewed and updated as appropriate: allergies, current medications, past family history, past medical history, past social history, past surgical history and problem list.    Review of Systems   Constitutional: Negative for chills, fatigue and fever.   HENT: Positive for congestion and sneezing. Negative for ear pain, rhinorrhea and sore throat.    Eyes: Positive for redness (right) and itching (right). Negative for blurred vision, double vision and visual disturbance.   Respiratory: Negative for cough, chest tightness, shortness of breath and wheezing.    Cardiovascular: Negative for chest pain, palpitations and leg swelling.   Gastrointestinal: Negative for abdominal pain, diarrhea, nausea and vomiting.   Endocrine: Negative for cold intolerance and heat intolerance.   Genitourinary: Negative for difficulty urinating, dysuria, frequency and hematuria.   Musculoskeletal: Negative for arthralgias, back pain, neck pain and neck stiffness.   Skin: Negative for dry skin, pallor, rash, skin lesions and bruise.   Allergic/Immunologic: Negative for environmental allergies, food allergies and immunocompromised state.   Neurological: Negative for dizziness, syncope, weakness, light-headedness, headache and confusion.   Hematological: Negative for  adenopathy. Does not bruise/bleed easily.   Psychiatric/Behavioral: Negative for self-injury, sleep disturbance, suicidal ideas, depressed mood and stress. The patient is not nervous/anxious.        Objective   Physical Exam   Constitutional: She is oriented to person, place, and time. She appears well-developed and well-nourished. She is cooperative. She does not have a sickly appearance. She does not appear ill. No distress.   HENT:   Head: Normocephalic.   Right Ear: Hearing, tympanic membrane, external ear and ear canal normal.   Left Ear: Hearing, tympanic membrane, external ear and ear canal normal.   Nose: Nose normal.   Mouth/Throat: Uvula is midline, oropharynx is clear and moist and mucous membranes are normal.   Eyes: Pupils are equal, round, and reactive to light. EOM and lids are normal. Lids are everted and swept, no foreign bodies found. Right conjunctiva is injected. Right conjunctiva has no hemorrhage. Left conjunctiva is not injected. Left conjunctiva has no hemorrhage.   Neck: Trachea normal, normal range of motion, full passive range of motion without pain and phonation normal. Neck supple. No thyroid mass and no thyromegaly present.   Cardiovascular: Normal rate, regular rhythm, S1 normal, S2 normal and normal heart sounds.   No murmur heard.  Pulmonary/Chest: Effort normal and breath sounds normal. No respiratory distress. She has no wheezes. She has no rhonchi. She has no rales.   Abdominal: Soft. Normal appearance. There is no tenderness.   Neurological: She is alert and oriented to person, place, and time. She has normal strength. No cranial nerve deficit. Coordination and gait normal.   Skin: Skin is warm, dry and intact. She is not diaphoretic. No pallor.   Psychiatric: She has a normal mood and affect. Her speech is normal and behavior is normal. Judgment and thought content normal. Cognition and memory are normal.     Vitals:    03/13/20 0904   BP: 130/78   Pulse: 79   Temp: 98.4 °F  (36.9 °C)   SpO2: 93%         Assessment/Plan   Sara was seen today for itchy eye.    Diagnoses and all orders for this visit:    Serous conjunctivitis of right eye  -     trimethoprim-polymyxin b (POLYTRIM) 98174-7.1 UNIT/ML-% ophthalmic solution; Administer 1 drop to the right eye Every 4 (Four) Hours.    Allergic rhinitis, unspecified seasonality, unspecified trigger  -     loratadine (CLARITIN) 10 MG tablet; Take 1 tablet by mouth Daily.    Serous conjunctivitis- I believed conjunctivitis is secondary to environmental allergies. Pt instructed to continue antihistamine eye drops and take claritin daily. If no improvement noticed then she can start with polytrim eye drops 1 drop every 4 hours while awake.  If symptoms do not improve or worsen, patient was instructed to return to clinic for further evaluation.         This document has been electronically signed by DEBORAH Garg on  March 13, 2020 09:29

## 2020-03-13 NOTE — PATIENT INSTRUCTIONS
Allergic Conjunctivitis  A clear membrane (conjunctiva) covers the white part of your eye and the inner surface of your eyelid. Allergic conjunctivitis happens when this membrane has inflammation. This is caused by allergies. Common causes of allergic reactions (allergens)include:  · Outdoor allergens, such as:  ? Pollen.  ? Grass and weeds.  ? Mold spores.  · Indoor allergens, such as:  ? Dust.  ? Smoke.  ? Mold.  ? Pet dander.  ? Animal hair.  This condition can make your eye red or pink. It can also make your eye feel itchy. This condition cannot be spread from one person to another person (is not contagious).  Follow these instructions at home:  · Try not to be around things that you are allergic to.  · Take or apply over-the-counter and prescription medicines only as told by your doctor. These include any eye drops.  · Place a cool, clean washcloth on your eye for 10-20 minutes. Do this 3-4 times a day.  · Do not touch or rub your eyes.  · Do not wear contact lenses until the inflammation is gone. Wear glasses instead.  · Do not wear eye makeup until the inflammation is gone.  · Keep all follow-up visits as told by your doctor. This is important.  Contact a doctor if:  · Your symptoms get worse.  · Your symptoms do not get better with treatment.  · You have mild eye pain.  · You are sensitive to light,  · You have spots or blisters on your eyes.  · You have pus coming from your eye.  · You have a fever.  Get help right away if:  · You have redness, swelling, or other symptoms in only one eye.  · Your vision is blurry.  · You have vision changes.  · You have very bad eye pain.  Summary  · Allergic conjunctivitis is caused by allergies. It can make your eye red or pink, and it can make your eye feel itchy.  · This condition cannot be spread from one person to another person (is not contagious).  · Try not to be around things that you are allergic to.  · Take or apply over-the-counter and prescription medicines  only as told by your doctor. These include any eye drops.  · Contact your doctor if your symptoms get worse or they do not get better with treatment.  This information is not intended to replace advice given to you by your health care provider. Make sure you discuss any questions you have with your health care provider.  Document Released: 06/07/2011 Document Revised: 08/11/2017 Document Reviewed: 08/11/2017  [employer] Patient Education © 2020 [employer]

## 2020-05-14 ENCOUNTER — OFFICE VISIT (OUTPATIENT)
Dept: FAMILY MEDICINE CLINIC | Facility: CLINIC | Age: 80
End: 2020-05-14

## 2020-05-14 VITALS
SYSTOLIC BLOOD PRESSURE: 150 MMHG | DIASTOLIC BLOOD PRESSURE: 60 MMHG | WEIGHT: 164.8 LBS | HEART RATE: 98 BPM | HEIGHT: 64 IN | BODY MASS INDEX: 28.13 KG/M2 | OXYGEN SATURATION: 96 % | TEMPERATURE: 97.3 F

## 2020-05-14 DIAGNOSIS — B02.9 HERPES ZOSTER WITHOUT COMPLICATION: Primary | ICD-10-CM

## 2020-05-14 PROCEDURE — 99213 OFFICE O/P EST LOW 20 MIN: CPT | Performed by: FAMILY MEDICINE

## 2020-05-14 RX ORDER — PRAMIPEXOLE DIHYDROCHLORIDE 0.5 MG/1
1 TABLET ORAL NIGHTLY
Qty: 180 TABLET | Refills: 3 | Status: SHIPPED | OUTPATIENT
Start: 2020-05-14 | End: 2021-08-11

## 2020-05-14 RX ORDER — HYDROCODONE BITARTRATE AND ACETAMINOPHEN 7.5; 325 MG/1; MG/1
1 TABLET ORAL EVERY 4 HOURS PRN
Qty: 60 TABLET | Refills: 0 | Status: SHIPPED | OUTPATIENT
Start: 2020-05-14 | End: 2020-06-02 | Stop reason: SDUPTHER

## 2020-05-14 NOTE — PATIENT INSTRUCTIONS

## 2020-05-14 NOTE — PROGRESS NOTES
" Subjective   Sara Solis is a 79 y.o. female.     History of Present Illness     Painful rash right groin.  Pain yesterday, rash today.   Has never had shingles  Has 30 valtrex 1 gram tablets at home uses for fever blisters.     Review of Systems   Constitutional: Negative for chills, fatigue and fever.   HENT: Negative for congestion, ear discharge, ear pain, facial swelling, hearing loss, postnasal drip, rhinorrhea, sinus pressure, sore throat, trouble swallowing and voice change.    Eyes: Negative for discharge, redness and visual disturbance.   Respiratory: Negative for cough, chest tightness, shortness of breath and wheezing.    Cardiovascular: Negative for chest pain and palpitations.   Gastrointestinal: Negative for abdominal pain, blood in stool, constipation, diarrhea, nausea and vomiting.   Endocrine: Negative for polydipsia and polyuria.   Genitourinary: Negative for dysuria, flank pain, hematuria and urgency.   Musculoskeletal: Negative for arthralgias, back pain, joint swelling and myalgias.   Skin: Positive for rash.   Neurological: Negative for dizziness, weakness, numbness and headaches.   Hematological: Negative for adenopathy.   Psychiatric/Behavioral: Negative for confusion and sleep disturbance. The patient is not nervous/anxious.            /60 (BP Location: Left arm, Patient Position: Sitting, Cuff Size: Adult)   Pulse 98   Temp 97.3 °F (36.3 °C) (Temporal)   Ht 162.6 cm (64.02\")   Wt 74.8 kg (164 lb 12.8 oz)   SpO2 96%   BMI 28.27 kg/m²       Objective     Physical Exam   Constitutional: She is oriented to person, place, and time. She appears well-developed and well-nourished.   HENT:   Head: Normocephalic and atraumatic.   Right Ear: External ear normal.   Left Ear: External ear normal.   Nose: Nose normal.   Eyes: Pupils are equal, round, and reactive to light. Conjunctivae and EOM are normal.   Neck: Normal range of motion.   Pulmonary/Chest: Effort normal. "   Musculoskeletal: Normal range of motion.   Neurological: She is alert and oriented to person, place, and time.   Skin:   Red small papules right anterior thigh/groin that moves dermatomal pattern to vulva area.  chaperone present   Psychiatric: She has a normal mood and affect. Her behavior is normal. Judgment and thought content normal.   Nursing note and vitals reviewed.          PAST MEDICAL HISTORY     Past Medical History:   Diagnosis Date   • Actinic keratosis    • Allergic rhinitis    • Altered bowel function    • Anxiety state    • Aptyalism    • Attention deficit hyperactivity disorder    • Bunion    • Cough    • Degenerative joint disease involving multiple joints    • Depressive disorder    • Diarrhea    • Disorder of skin    • Fissure in skin    • Foot pain    • Generalized anxiety disorder    • Hepatitis    • Hepatitis C    • Herpes labialis    • History of bone scan 10/01/2008    DXA BONE DENSITY AXIAL 80475 (2) - Osteopenia of the lumbar spine, which has improved compared to the prior study. Normal bone mineral density of the hips, which has improved compared to the prior study   • History of colonoscopy 04/05/2011    Colon endoscopy 37104 (2) - Diverticulosis found in sigmoid colon. Moderate fixation of sigmoid colon. Internal hemorrhoids found in anus.    • History of esophagogastroduodenoscopy 04/05/2011    EGD w/ tube 72008 (1) - Normal hypopharynx, GE junction, duodenum, symmetrical & patent pylorus. Normal esophagus. Dilatation performed. Chronic gastritis found in antrum. Biopsy taken.   • History of mammogram 10/01/2008    Mammogram, both breasts (1) - Negative mammogram. Recommend follow-up in 12 months;     • Incontinence of feces    • Increased frequency of urination    • Insomnia    • Irritable bowel syndrome    • Knee pain    • Lipoma of shoulder    • Low back pain    • Lumbosacral radiculopathy    • Nausea    • Nervous system abnormality     Disorder of the peripheral nervous system      • Osteopenia    • Pain in limb    • Pain in lower limb    • Plantar fasciitis    • Pure hypercholesterolemia    • Restless legs    • Skin lesion     nose   • Spasm    • Spasm of back muscles    • Squamous cell carcinoma of skin    • Stress fracture    • Tear film insufficiency    • Trochanteric bursitis of right hip    • Upper respiratory infection    • Urinary tract infectious disease    • Vertigo       PAST SURGICAL HISTORY     Past Surgical History:   Procedure Laterality Date   • BREAST BIOPSY  12/04/1995    BIOPSY OF BREAST OPEN 97973 (2) - Right,Exscision of mass.Fibroadenoma. Fibrocystic disease, proliferative   • CATARACT EXTRACTION WITH INTRAOCULAR LENS IMPLANT Right 03/07/2006    Remove cataract, insert lens (1) - right   • DILATATION AND CURETTAGE  10/02/1986    D&C (1) - Fractional D&C laparoscopic tubal sterilization. Plus uterine fibroids, approximately 10-12 weeks in size   • FOOT SURGERY  03/20/1990    Foot/toes surgery procedure (1) - Left,Excisional biopsy. Tumor, supposed fibroma, 5th toe   • HIP PINNING Right 12/21/2017    Procedure: HIP PINNING                 (C-ARM#3);  Surgeon: Basil Ulrich MD;  Location: [facility];  Service:    • INJECTION OF MEDICATION  06/08/2015    Kenalog (2) - SERENA Robert   • LIVER BIOPSY  04/26/2000    Needle biopsy of liver (1) - percutaneous liver biopsy;     • OTHER SURGICAL HISTORY  10/17/2002    Chest procedure (2) - Intralesional injection of keloid of chest    • OTHER SURGICAL HISTORY  10/25/2001    Remove axillary lymph nodes (1) - Biopsy, Left axillary adenopathy. 4 lymph nodes with reactive hyperplasia No tumor seen   • OTHER SURGICAL HISTORY  06/17/2013    Biopsy, Each Additional Lesion 05161 (1) - SERENA Robert   • OTHER SURGICAL HISTORY  07/14/2014    Destruction of Premalignant Lesion (1st) 08898 (2)  - SERENA Robert   • OTHER SURGICAL HISTORY  05/10/2016    Drain/Inject Major Joint 73043 (4) - SERENA Robert   • SKIN BIOPSY  07/28/2014    Biopsy of Skin 54443  (2)  Keegan Robert   • TOTAL ABDOMINAL HYSTERECTOMY WITH SALPINGO OOPHORECTOMY  09/25/1990    incidental appendectomy.Uterine fibroids      SOCIAL HISTORY     Social History     Socioeconomic History   • Marital status:      Spouse name: Not on file   • Number of children: Not on file   • Years of education: Not on file   • Highest education level: Not on file   Tobacco Use   • Smoking status: Never Smoker   • Smokeless tobacco: Never Used   Substance and Sexual Activity   • Alcohol use: No   • Drug use: No   • Sexual activity: Never      ALLERGIES   Patient has no known allergies.   MEDICATIONS     Current Outpatient Medications   Medication Sig Dispense Refill   • ammonium lactate (AMLACTIN) 12 % cream      • cholecalciferol (VITAMIN D3) 1000 UNITS tablet Take 1,000 Units by mouth Daily.     • fluticasone (FLONASE) 50 MCG/ACT nasal spray 2 sprays into the nostril(s) as directed by provider Daily. 1 bottle 2   • gabapentin (NEURONTIN) 600 MG tablet 1/2 to one tablet tid prn. 270 tablet 1   • HYDROcodone-acetaminophen (NORCO) 7.5-325 MG per tablet Take 1 tablet by mouth Every 4 (Four) Hours As Needed for Mild Pain . 60 tablet 0   • lidocaine-hydrocortisone 3-0.5 % cream rectal cream Insert 1 application into the rectum 2 (Two) Times a Day As Needed (HEMORRHOID). 1 tube 11   • loratadine (CLARITIN) 10 MG tablet Take 1 tablet by mouth Daily. 30 tablet 11   • Magnesium 100 MG capsule Take 400 mg by mouth Daily.     • pramipexole (MIRAPEX) 0.5 MG tablet Take 2 tablets by mouth Every Night. 180 tablet 3   • trimethoprim-polymyxin b (POLYTRIM) 99699-2.1 UNIT/ML-% ophthalmic solution Administer 1 drop to the right eye Every 4 (Four) Hours. 10 mL 0   • Multiple Vitamins-Minerals (CENTRUM SILVER PO) Take  by mouth Daily.     • naproxen sodium (ALEVE) 220 MG tablet Take 220 mg by mouth 2 (Two) Times a Day As Needed.     • promethazine (PHENERGAN) 25 MG tablet Take 1 tablet by mouth Every 6 (Six) Hours As Needed for Nausea  or Vomiting. 30 tablet 0   • sertraline (ZOLOFT) 50 MG tablet Take 0.5-1 tablets by mouth Daily. 90 tablet 3   • valACYclovir (VALTREX) 1000 MG tablet Take 1 tablet by mouth Take As Directed. 30 tablet 11     No current facility-administered medications for this visit.         The following portions of the patient's history were reviewed and updated as appropriate: allergies, current medications, past family history, past medical history, past social history, past surgical history and problem list.        Assessment/Plan   Sara was seen today for rash.    Diagnoses and all orders for this visit:    Herpes zoster without complication  -     HYDROcodone-acetaminophen (NORCO) 7.5-325 MG per tablet; Take 1 tablet by mouth Every 4 (Four) Hours As Needed for Mild Pain .    Other orders  -     pramipexole (MIRAPEX) 0.5 MG tablet; Take 2 tablets by mouth Every Night.      Start valtrex 1 gram tid 7 days    Geronimo:  [id number]                 No follow-ups on file.                  This document has been electronically signed by Sulaiman Robert MD on May 14, 2020 17:02

## 2020-06-02 ENCOUNTER — TELEPHONE (OUTPATIENT)
Dept: FAMILY MEDICINE CLINIC | Facility: CLINIC | Age: 80
End: 2020-06-02

## 2020-06-02 DIAGNOSIS — G57.93 NEUROPATHY OF BOTH FEET: ICD-10-CM

## 2020-06-02 DIAGNOSIS — B02.9 HERPES ZOSTER WITHOUT COMPLICATION: ICD-10-CM

## 2020-06-02 RX ORDER — HYDROCODONE BITARTRATE AND ACETAMINOPHEN 7.5; 325 MG/1; MG/1
1 TABLET ORAL EVERY 4 HOURS PRN
Qty: 60 TABLET | Refills: 0 | Status: SHIPPED | OUTPATIENT
Start: 2020-06-02 | End: 2021-04-06 | Stop reason: SDUPTHER

## 2020-06-02 RX ORDER — GABAPENTIN 600 MG/1
TABLET ORAL
Qty: 270 TABLET | Refills: 1 | Status: SHIPPED | OUTPATIENT
Start: 2020-06-02 | End: 2020-09-14

## 2020-06-02 NOTE — TELEPHONE ENCOUNTER
PATIENT IS STILL HAVING TROUBLE WITH SHINGLES.  SHE IS RUNNING LOW ON PAIN MED.  SHE IS CUTTING IN HALF.  SHE DIDN'T KNOW IF THERE WAS ANYTHING ELSE TO DO.  SHOULD SHE COME IN?

## 2020-06-15 ENCOUNTER — TELEPHONE (OUTPATIENT)
Dept: FAMILY MEDICINE CLINIC | Facility: CLINIC | Age: 80
End: 2020-06-15

## 2020-06-15 DIAGNOSIS — M25.569 KNEE PAIN, UNSPECIFIED CHRONICITY, UNSPECIFIED LATERALITY: Primary | ICD-10-CM

## 2020-06-22 ENCOUNTER — TRANSCRIBE ORDERS (OUTPATIENT)
Dept: PHYSICAL THERAPY | Facility: CLINIC | Age: 80
End: 2020-06-22

## 2020-06-22 ENCOUNTER — TREATMENT (OUTPATIENT)
Dept: PHYSICAL THERAPY | Facility: CLINIC | Age: 80
End: 2020-06-22

## 2020-06-22 DIAGNOSIS — M17.11 PRIMARY OSTEOARTHRITIS OF RIGHT KNEE: ICD-10-CM

## 2020-06-22 DIAGNOSIS — M17.11 OSTEOARTHRITIS OF RIGHT KNEE, UNSPECIFIED OSTEOARTHRITIS TYPE: ICD-10-CM

## 2020-06-22 DIAGNOSIS — M70.61 GREATER TROCHANTERIC BURSITIS OF RIGHT HIP: Primary | ICD-10-CM

## 2020-06-22 DIAGNOSIS — M25.78 OSTEOPHYTE OF VERTEBRAE: ICD-10-CM

## 2020-06-22 DIAGNOSIS — M25.78 OSTEOPHYTE, VERTEBRAE: ICD-10-CM

## 2020-06-22 DIAGNOSIS — M25.551 RIGHT HIP PAIN: ICD-10-CM

## 2020-06-22 DIAGNOSIS — M70.61 GREATER TROCHANTERIC BURSITIS, RIGHT: Primary | ICD-10-CM

## 2020-06-22 PROCEDURE — 97161 PT EVAL LOW COMPLEX 20 MIN: CPT | Performed by: PHYSICAL THERAPIST

## 2020-06-22 PROCEDURE — 97140 MANUAL THERAPY 1/> REGIONS: CPT | Performed by: PHYSICAL THERAPIST

## 2020-06-22 PROCEDURE — 97110 THERAPEUTIC EXERCISES: CPT | Performed by: PHYSICAL THERAPIST

## 2020-06-22 NOTE — PROGRESS NOTES
Physical Therapy Initial Evaluation and Plan of Care        Patient: Sara Solis   : 1940  Diagnosis/ICD-10 Code:  Greater trochanteric bursitis of right hip [M70.61]  Referring practitioner: No ref. provider found  Date of Initial Visit: 2020  Today's Date: 2020  Patient seen for 1 sessions  REcert   MD ?         Subjective Questionnaire: LEFS:       Subjective Evaluation    History of Present Illness  Onset date: 2 months.    Subjective comment: hip fracture 12  makes it hard to walk.  Patient Occupation: retired  Quality of life: good    Pain  Current pain ratin  At worst pain ratin  Location: back and hip into the groin  Quality: dull ache  Relieving factors: rest and change in position (Tylenol)  Aggravating factors: movement, standing, ambulation and prolonged positioning  Progression: worsening    Social Support  Lives in: one-story house  Lives with: alone    Hand dominance: right    Diagnostic Tests  X-ray: abnormal    Treatments  Previous treatment: physical therapy  Patient Goals  Patient goals for therapy: independence with ADLs/IADLs, decreased pain and increased strength         Objective   Patient presents today with no assistive device or bracing.  She is slow and guarded with decreased stance time on the right compared to the left.  Lumbar flexion 3 inches from floor extension 25 degrees, lateral sidebending 75% rotation within normal limits.  Hip flexor quadriceps and hamstring strength 4+, hip abduction on the right 4, hip extension 4.  Tenderness to palpation right SI joint, right piriformis, right GTB, right IT band.  Positive Arpit test.  Bent knee fall out 45 degreesRight, left 60  Right knee 0-126 degrees, significant crepitus in knee.  Assessment & Plan     Assessment  Impairments: abnormal gait, abnormal or restricted ROM, activity intolerance, impaired physical strength, lacks appropriate home exercise program and pain with  function  Assessment details: Patient has had previous back surgery and significant arthritis in the hip and knee.  Previous right femur fracture also affecting gait with leg length discrepancy and decreased strength.  Patient benefit from skilled physical therapy intervention to include manual therapy strengthening and gait activities  Prognosis: fair  Functional Limitations: walking, uncomfortable because of pain and standing  Goals  Plan Goals: 1.  Patient will have an LEFS score of 40/80  2.  Patient have manual muscle testing of quadriceps 5/5 right LE   3.  Patient will have right hip flexor strength 5/5  4.  Patient to maintain pelvic alignment with no LLD R  5.  Patient to maintain level sacral base alignment.  6.  Patient to be non reactive to palpation.  7.  Patient independent in HEP and self care.    LTG  1.  Patient will have right hip abduction strength 5/5  2.  Patient have an LEFS score of 44/80  3.  Patient will report 60 to 70% overall improvement  4.  Patient will be fitted with a lift if leg length discrepancy not corrected      Plan  Planned modality interventions: cryotherapy  Planned therapy interventions: manual therapy, abdominal trunk stabilization, gait training, home exercise program, stretching, strengthening and functional ROM exercises  Duration in visits: 16  Treatment plan discussed with: patient  Plan details: Hip and core strengthening activities stretching myofascial release and manual therapies to the SI joints and IT band.  Gait and proprioceptive training. ice        Visit Diagnoses:    ICD-10-CM ICD-9-CM   1. Greater trochanteric bursitis of right hip M70.61 726.5   2. Right hip pain M25.551 719.45   3. Osteophyte of vertebrae M25.78 721.8   4. Primary osteoarthritis of right knee M17.11 715.16       Timed:  Manual Therapy:      10   mins  10507;  Therapeutic Exercise:    15     mins  96877;     Neuromuscular Rafaela:        mins  74265;    Therapeutic Activity:          mins   04237;     Gait Training:           mins  19539;     Ultrasound:          mins  57629;    Electrical Stimulation:         mins  70131 ( );    Untimed:  Electrical Stimulation:       mins  68475 ( );  Mechanical Traction:         mins  60965;     Timed Treatment:  25    mins   Total Treatment:      25  mins    PT SIGNATURE: Zully Purcell, PT DPT   DATE TREATMENT INITIATED: 6/22/2020    Initial Evaluation Certification Period: 9/20/2020  I certify that the therapy services are furnished while this patient is under my care.  The services outlined above are required by this patient, and will be reviewed every 90 days.     PHYSICIAN:       DATE:     Please sign and return via fax to [phone].. Thank you, [facility].

## 2020-06-25 ENCOUNTER — TREATMENT (OUTPATIENT)
Dept: PHYSICAL THERAPY | Facility: CLINIC | Age: 80
End: 2020-06-25

## 2020-06-25 DIAGNOSIS — M25.78 OSTEOPHYTE OF VERTEBRAE: ICD-10-CM

## 2020-06-25 DIAGNOSIS — M25.551 RIGHT HIP PAIN: ICD-10-CM

## 2020-06-25 DIAGNOSIS — M17.11 PRIMARY OSTEOARTHRITIS OF RIGHT KNEE: ICD-10-CM

## 2020-06-25 DIAGNOSIS — M70.61 GREATER TROCHANTERIC BURSITIS OF RIGHT HIP: Primary | ICD-10-CM

## 2020-06-25 PROCEDURE — 97110 THERAPEUTIC EXERCISES: CPT | Performed by: PHYSICAL THERAPIST

## 2020-06-25 NOTE — PROGRESS NOTES
Physical Therapy Daily Progress Note      Patient: Sara Solis   : 1940  Referring practitioner: DEBORAH Box  Date of Initial Visit: Type: THERAPY  Noted: 2020  Today's Date: 2020  Patient seen for 2 sessions  REcert   MD BAKER     Sara Solis reports: About the same  Subjective Questionnaire:       Subjective     Objective   See Exercise, Manual, and Modality Logs for complete treatment.   NAG today. Cramps in hamstrings with TKE and bridges.    Assessment/Plan  Goals  Plan Goals: 1.  Patient will have an LEFS score of 40/80  2.  Patient have manual muscle testing of quadriceps 5/5 right LE   3.  Patient will have right hip flexor strength 5/5  4.  Patient to maintain pelvic alignment with no LLD R  5.  Patient to maintain level sacral base alignment.  6.  Patient to be non reactive to palpation.  7.  Patient independent in HEP and self care.    LTG  1.  Patient will have right hip abduction strength 5/5  2.  Patient have an LEFS score of 44/80  3.  Patient will report 60 to 70% overall improvement  4.  Patient will be fitted with a lift if leg length discrepancy not corrected  Visit Diagnoses:    ICD-10-CM ICD-9-CM   1. Greater trochanteric bursitis of right hip M70.61 726.5   2. Right hip pain M25.551 719.45   3. Osteophyte of vertebrae M25.78 721.8   4. Primary osteoarthritis of right knee M17.11 715.16       Progress per Plan of Care           Timed:  Manual Therapy:         mins  21826;  Therapeutic Exercise:  42       mins  74206;     Neuromuscular Rafaela:        mins  54049;    Therapeutic Activity:          mins  68297;     Gait Training:          mins  14539;     Ultrasound:          mins  08257;    Electrical Stimulation:         mins  97063 ( );    Untimed:  Electrical Stimulation:         mins  24986 ( );  Mechanical Traction:         mins  85790;     Timed Treatment:   42   mins   Total Treatment:    42    mins  Zully Purcell, PT  DPT  Physical Therapist

## 2020-07-01 ENCOUNTER — TREATMENT (OUTPATIENT)
Dept: PHYSICAL THERAPY | Facility: CLINIC | Age: 80
End: 2020-07-01

## 2020-07-01 DIAGNOSIS — M70.61 GREATER TROCHANTERIC BURSITIS OF RIGHT HIP: Primary | ICD-10-CM

## 2020-07-01 DIAGNOSIS — M25.551 RIGHT HIP PAIN: ICD-10-CM

## 2020-07-01 PROCEDURE — 97110 THERAPEUTIC EXERCISES: CPT | Performed by: PHYSICAL THERAPIST

## 2020-07-01 NOTE — PROGRESS NOTES
"   Physical Therapy Daily Progress Note      Patient: Sara Solis   : 1940  Referring practitioner: DEBORAH Box  Date of Initial Visit: Type: THERAPY  Noted: 2020  Today's Date: 2020  Patient seen for 3 sessions    REcert   MD BAKER       Sara Solis reports: \"maybe a little\"        Subjective Evaluation    History of Present Illness    Subjective comment: pt states that she might be able to tell a little difference since starting therapyPain  Current pain rating: 3           Objective          Postural Observations    Additional Postural Observation Details  R ASIS up, L down. Corrected with MET      See Exercise, Manual, and Modality Logs for complete treatment.       Assessment & Plan     Assessment  Assessment details: pts alignment was off today but corrected easily with MET. Pt did well with exercises given. Did complain of mild muscle cramps in R HS throughout treatment.     Goals  Plan Goals: Plan Goals: 1.  Patient will have an LEFS score of 40/80  2.  Patient have manual muscle testing of quadriceps 5/5 right LE   3.  Patient will have right hip flexor strength 5/5  4.  Patient to maintain pelvic alignment with no LLD R  5.  Patient to maintain level sacral base alignment.  6.  Patient to be non reactive to palpation.  7.  Patient independent in HEP and self care.    LTG  1.  Patient will have right hip abduction strength 5/5  2.  Patient have an LEFS score of 44/80  3.  Patient will report 60 to 70% overall improvement  4.  Patient will be fitted with a lift if leg length discrepancy not corrected    Plan  Duration in visits: 16  Plan details: Possibly try to begin light standing exercises. Try hip flexor S bilaterally next visit        Visit Diagnoses:    ICD-10-CM ICD-9-CM   1. Greater trochanteric bursitis of right hip M70.61 726.5   2. Right hip pain M25.551 719.45       Progress per Plan of Care and Progress strengthening /stabilization /functional " activity           Timed:  Manual Therapy:    5     mins  54447;  Therapeutic Exercise:    35     mins  69772;     Neuromuscular Rafaela:        mins  68653;    Therapeutic Activity:          mins  55875;     Gait Training:           mins  41674;     Ultrasound:          mins  54708;    Electrical Stimulation:         mins  17341 ( );    Untimed:  Electrical Stimulation:         mins  57143 ( );  Mechanical Traction:         mins  43365;     Timed Treatment:   40   mins   Total Treatment:     40   mins  Rosanna Aguilar [facility]  Physical Therapist

## 2020-07-06 ENCOUNTER — TREATMENT (OUTPATIENT)
Dept: PHYSICAL THERAPY | Facility: CLINIC | Age: 80
End: 2020-07-06

## 2020-07-06 DIAGNOSIS — M17.11 PRIMARY OSTEOARTHRITIS OF RIGHT KNEE: ICD-10-CM

## 2020-07-06 DIAGNOSIS — M25.78 OSTEOPHYTE OF VERTEBRAE: ICD-10-CM

## 2020-07-06 DIAGNOSIS — M25.551 RIGHT HIP PAIN: ICD-10-CM

## 2020-07-06 DIAGNOSIS — M70.61 GREATER TROCHANTERIC BURSITIS OF RIGHT HIP: Primary | ICD-10-CM

## 2020-07-06 PROCEDURE — 97140 MANUAL THERAPY 1/> REGIONS: CPT | Performed by: PHYSICAL THERAPIST

## 2020-07-06 PROCEDURE — 97110 THERAPEUTIC EXERCISES: CPT | Performed by: PHYSICAL THERAPIST

## 2020-07-06 NOTE — PROGRESS NOTES
Physical Therapy Daily Progress Note      Patient: Sara Solis   : 1940  Referring practitioner: DEBORAH Box  Date of Initial Visit: Type: THERAPY  Noted: 2020  Today's Date: 2020  Patient seen for 4 sessions    Recert due:  20  MD followup:  prhafsa     Sara Solis reports:   Subjective Questionnaire:       Subjective  Pt reports having moderate pain in R hip, knee and lumbar area today.  Pre RX pain 4/10.      Objective   See Exercise, Manual, and Modality Logs for complete treatment.       Assessment & Plan     Assessment  Assessment details: Pt displays weakness in core musculature, as well as R hip & knee.  Tolerated therex well.  Added ice post RX for pain and inflammation.     Goals  Plan Goals: Plan Goals: Plan Goals: 1.  Patient will have an LEFS score of 40/80  2.  Patient have manual muscle testing of quadriceps 5/5 right LE   3.  Patient will have right hip flexor strength 5/5  4.  Patient to maintain pelvic alignment with no LLD R  5.  Patient to maintain level sacral base alignment.  6.  Patient to be non reactive to palpation.  7.  Patient independent in HEP and self care.    LTG  1.  Patient will have right hip abduction strength 5/5  2.  Patient have an LEFS score of 44/80  3.  Patient will report 60 to 70% overall improvement  4.  Patient will be fitted with a lift if leg length discrepancy not corrected    Plan  Duration in visits: 16  Plan details: SLR FF        Visit Diagnoses:    ICD-10-CM ICD-9-CM   1. Greater trochanteric bursitis of right hip M70.61 726.5   2. Right hip pain M25.551 719.45   3. Osteophyte of vertebrae M25.78 721.8   4. Primary osteoarthritis of right knee M17.11 715.16                  Timed:  Manual Therapy:    8     mins  27651;  Therapeutic Exercise:   33      mins  59812;     Neuromuscular Rafaela:        mins  56140;    Therapeutic Activity:          mins  65180;     Gait Training:           mins  17075;     Ultrasound:           mins  68999;    Electrical Stimulation:         mins  82403 ( );    Untimed:  Electrical Stimulation:         mins  41891 ( );  Mechanical Traction:         mins  33127;   Ice                                15   mins    Timed Treatment: 41     mins   Total Treatment:   56     mins  Debra Luque PTA  Physical Therapist

## 2020-07-13 ENCOUNTER — TREATMENT (OUTPATIENT)
Dept: PHYSICAL THERAPY | Facility: CLINIC | Age: 80
End: 2020-07-13

## 2020-07-13 DIAGNOSIS — M25.78 OSTEOPHYTE OF VERTEBRAE: ICD-10-CM

## 2020-07-13 DIAGNOSIS — M70.61 GREATER TROCHANTERIC BURSITIS OF RIGHT HIP: Primary | ICD-10-CM

## 2020-07-13 DIAGNOSIS — M25.551 RIGHT HIP PAIN: ICD-10-CM

## 2020-07-13 PROCEDURE — 97140 MANUAL THERAPY 1/> REGIONS: CPT | Performed by: PHYSICAL THERAPIST

## 2020-07-13 NOTE — PROGRESS NOTES
Re-Assessment / Re-Certification  Patient: Sara Solis   : 1940  Diagnosis/ICD-10 Code:  Greater trochanteric bursitis of right hip [M70.61]  Referring practitioner: DEBORAH Box  Date of Initial Visit: Type: THERAPY  Noted: 2020  Today's Date: 2020  Patient seen for 5 sessions  recert 8/3/20  MD ??    Subjective:   Sara Solis reports: not better, Vertigo and nausea today  Subjective Questionnaire: Oswestry: 33%   LEFS: 36/80  Clinical Progress: unchanged  Home Program Compliance: Yes  Treatment has included: therapeutic exercise, manual therapy and cryotherapy    Subjective   Objective   Posterior left sacral base, elevation on right. LLD right 1/2-3/4 inch. TTP GTB and down IT band  Lumbar flexion to the floor, ext 25 degrees, lateral flexion 75% of normal limits. Tight quad and hip flexor on the right. Quad strength 5/5 hip flexor 4+, hamstring 4+, abd 4/5  Negative Arpit test.  Right knee 0-130 degrees  Assessment/Plan  Goals   1.  Patient will have an LEFS score of 40/80- not met  2.  Patient have manual muscle testing of quadriceps 5/5 right LE MET   3.  Patient will have right hip flexor strength 5/5  4.  Patient to maintain pelvic alignment with no LLD R not met  5.  Patient to maintain level sacral base alignment.  6.  Patient to be non reactive to palpation.  7.  Patient independent in HEP and self care.    LTG  1.  Patient will have right hip abduction strength 5/5  2.  Patient have an LEFS score of 44/80 (36)- progression  3.  Patient will report 60 to 70% overall improvement not met  4.  Patient will be fitted with a lift if leg length discrepancy not corrected MEt    Visit Diagnoses:    ICD-10-CM ICD-9-CM   1. Greater trochanteric bursitis of right hip M70.61 726.5   2. Right hip pain M25.551 719.45   3. Osteophyte of vertebrae M25.78 721.8       Progress toward previous goals: Partially Met, dynadisc stability exercises  May consider light traction         Recommendations: Continue as planned  Timeframe: 2 weeks  Prognosis to achieve goals: good    PT Signature: Zully Purcell PT DPT      Based upon review of the patient's progress and continued therapy plan, it is my medical opinion that Sara Solis should continue physical therapy treatment at [facility]  [address]  [facility] [id number] [phone].    Signature: __________________________________  Eddie Mendoza, DEBORAH    Timed:  Manual Therapy:  12       mins  12434;  Therapeutic Exercise:      28  mins  06040;     Neuromuscular Rafaela:        mins  19504;    Therapeutic Activity:          mins  03862;     Gait Training:           mins  22942;     Ultrasound:          mins  02163;    Electrical Stimulation:         mins  62801 ( );    Untimed:  Electrical Stimulation:         mins  42447 ( );  Mechanical Traction:         mins  76232;     Timed Treatment:40      mins   Total Treatment:     40 mins

## 2020-07-16 ENCOUNTER — TREATMENT (OUTPATIENT)
Dept: PHYSICAL THERAPY | Facility: CLINIC | Age: 80
End: 2020-07-16

## 2020-07-16 DIAGNOSIS — M25.78 OSTEOPHYTE OF VERTEBRAE: ICD-10-CM

## 2020-07-16 DIAGNOSIS — M25.551 RIGHT HIP PAIN: ICD-10-CM

## 2020-07-16 DIAGNOSIS — M70.61 GREATER TROCHANTERIC BURSITIS OF RIGHT HIP: Primary | ICD-10-CM

## 2020-07-16 DIAGNOSIS — M17.11 PRIMARY OSTEOARTHRITIS OF RIGHT KNEE: ICD-10-CM

## 2020-07-16 PROCEDURE — 97110 THERAPEUTIC EXERCISES: CPT | Performed by: PHYSICAL THERAPIST

## 2020-07-20 ENCOUNTER — TREATMENT (OUTPATIENT)
Dept: PHYSICAL THERAPY | Facility: CLINIC | Age: 80
End: 2020-07-20

## 2020-07-20 DIAGNOSIS — M70.61 GREATER TROCHANTERIC BURSITIS OF RIGHT HIP: Primary | ICD-10-CM

## 2020-07-20 DIAGNOSIS — M25.78 OSTEOPHYTE OF VERTEBRAE: ICD-10-CM

## 2020-07-20 DIAGNOSIS — M25.551 RIGHT HIP PAIN: ICD-10-CM

## 2020-07-20 DIAGNOSIS — M17.11 PRIMARY OSTEOARTHRITIS OF RIGHT KNEE: ICD-10-CM

## 2020-07-20 PROCEDURE — 97110 THERAPEUTIC EXERCISES: CPT | Performed by: PHYSICAL THERAPIST

## 2020-07-20 PROCEDURE — 97012 MECHANICAL TRACTION THERAPY: CPT | Performed by: PHYSICAL THERAPIST

## 2020-07-20 RX ORDER — PROMETHAZINE HYDROCHLORIDE 25 MG/1
TABLET ORAL
Qty: 30 TABLET | Refills: 0 | Status: SHIPPED | OUTPATIENT
Start: 2020-07-20 | End: 2020-12-01 | Stop reason: SDUPTHER

## 2020-07-20 NOTE — PROGRESS NOTES
Physical Therapy Daily Progress Note      Patient: Sara Solis   : 1940  Referring practitioner: DEBORAH Box  Date of Initial Visit: Type: THERAPY  Noted: 2020  Today's Date: 2020  Patient seen for 7 sessions    Recert due:   20  MD followup:  TBD     Sara Solis reports: 20% improvement  Subjective Questionnaire:       Subjective   Saw APRN for MRI results.  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  Pre RX pan 4/10.     Objective  LL=; pelvis level    See Exercise, Manual, and Modality Logs for complete treatment.       Assessment & Plan     Assessment  Assessment details: Discussed MRI findings with PT.  She approved trial of traction and if helpful, will request more visits from insurance.  Pt required few cues for HEP performance.  Initiated mechanical traction without difficulty.      Goals  Plan Goals: 1.  Patient will have an LEFS score of 40/80- not met  2.  Patient have manual muscle testing of quadriceps 5/5 right LE MET   3.  Patient will have right hip flexor strength 5/5  4.  Patient to maintain pelvic alignment with no LLD R - MET  5.  Patient to maintain level sacral base alignment.  6.  Patient to be non reactive to palpation.- MET  7.  Patient independent in HEP and self care.    LTG  1.  Patient will have right hip abduction strength 5/5  2.  Patient have an LEFS score of 44/80 (36)- progression  3.  Patient will report 60 to 70% overall improvement not met  4.  Patient will be fitted with a lift if leg length discrepancy not corrected MEt    Plan  Duration in visits: 16  Plan details: Assess benefit of traction.  Request more visits from insurance to continue traction.          Visit Diagnoses:    ICD-10-CM ICD-9-CM   1. Greater trochanteric bursitis of right hip M70.61 726.5   2. Right hip pain M25.551 719.45   3. Osteophyte of vertebrae M25.78 721.8   4. Primary osteoarthritis of right knee  M17.11 715.16                  Timed:  Manual Therapy:         mins  85822;  Therapeutic Exercise:   30      mins  76895;     Neuromuscular Rafaela:        mins  62508;    Therapeutic Activity:          mins  73628;     Gait Training:           mins  11432;     Ultrasound:          mins  41123;    Electrical Stimulation:         mins  98469 ( );    Untimed:  Electrical Stimulation:         mins  51139 ( );  Mechanical Traction:    12     mins  68016;     Timed Treatment:  30    mins   Total Treatment:    42    mins  Debra Luque PTA  Physical Therapist

## 2020-07-23 ENCOUNTER — TREATMENT (OUTPATIENT)
Dept: PHYSICAL THERAPY | Facility: CLINIC | Age: 80
End: 2020-07-23

## 2020-07-23 DIAGNOSIS — M25.551 RIGHT HIP PAIN: ICD-10-CM

## 2020-07-23 DIAGNOSIS — M17.11 PRIMARY OSTEOARTHRITIS OF RIGHT KNEE: ICD-10-CM

## 2020-07-23 DIAGNOSIS — M70.61 GREATER TROCHANTERIC BURSITIS OF RIGHT HIP: Primary | ICD-10-CM

## 2020-07-23 DIAGNOSIS — M25.78 OSTEOPHYTE OF VERTEBRAE: ICD-10-CM

## 2020-07-23 PROCEDURE — 97012 MECHANICAL TRACTION THERAPY: CPT | Performed by: PHYSICAL THERAPIST

## 2020-07-23 PROCEDURE — 97110 THERAPEUTIC EXERCISES: CPT | Performed by: PHYSICAL THERAPIST

## 2020-07-23 NOTE — PROGRESS NOTES
Physical Therapy Daily Progress Note      Patient: Sara Solis   : 1940  Referring practitioner: DEBORAH Box  Date of Initial Visit: Type: THERAPY  Noted: 2020  Today's Date: 2020  Patient seen for 8 sessions    Recert due:  20  MD followup:   TBD     Sara Solis reports:  20% improvement  Subjective Questionnaire:       Subjective  Reports being sore after initial traction RX on .  Feels better today.  Pre RX pain 4/10.     Objective   See Exercise, Manual, and Modality Logs for complete treatment.       Assessment & Plan     Assessment  Assessment details: Pt tolerated therex well.  Kept traction weight the same since she was pretty sore after first treatment.      Goals  Plan Goals: Plan Goals: 1.  Patient will have an LEFS score of 40/80- not met  2.  Patient have manual muscle testing of quadriceps 5/5 right LE MET   3.  Patient will have right hip flexor strength 5/5  4.  Patient to maintain pelvic alignment with no LLD R - MET  5.  Patient to maintain level sacral base alignment.  6.  Patient to be non reactive to palpation.- MET  7.  Patient independent in HEP and self care.    LTG  1.  Patient will have right hip abduction strength 5/5  2.  Patient have an LEFS score of 44/80 (36)- progression  3.  Patient will report 60 to 70% overall improvement not met  4.  Patient will be fitted with a lift if leg length discrepancy not corrected MET    Plan  Duration in visits: 16  Plan details: Standing 3 way hip        Visit Diagnoses:    ICD-10-CM ICD-9-CM   1. Greater trochanteric bursitis of right hip M70.61 726.5   2. Right hip pain M25.551 719.45   3. Osteophyte of vertebrae M25.78 721.8   4. Primary osteoarthritis of right knee M17.11 715.16                  Timed:  Manual Therapy:         mins  03311;  Therapeutic Exercise:   40      mins  17112;     Neuromuscular Rafaela:        mins  19069;    Therapeutic Activity:          mins  52576;     Gait Training:            mins  93447;     Ultrasound:          mins  72184;    Electrical Stimulation:         mins  42863 ( );    Untimed:  Electrical Stimulation:         mins  01382 ( );  Mechanical Traction:   15      mins  01801;     Timed Treatment:   40   mins   Total Treatment:    55    mins  Debra Luque PTA  Physical Therapist

## 2020-07-27 ENCOUNTER — TREATMENT (OUTPATIENT)
Dept: PHYSICAL THERAPY | Facility: CLINIC | Age: 80
End: 2020-07-27

## 2020-07-27 DIAGNOSIS — M70.61 GREATER TROCHANTERIC BURSITIS OF RIGHT HIP: Primary | ICD-10-CM

## 2020-07-27 DIAGNOSIS — M17.11 PRIMARY OSTEOARTHRITIS OF RIGHT KNEE: ICD-10-CM

## 2020-07-27 DIAGNOSIS — M25.551 RIGHT HIP PAIN: ICD-10-CM

## 2020-07-27 DIAGNOSIS — M25.78 OSTEOPHYTE OF VERTEBRAE: ICD-10-CM

## 2020-07-27 PROCEDURE — 97110 THERAPEUTIC EXERCISES: CPT | Performed by: PHYSICAL THERAPIST

## 2020-07-27 PROCEDURE — 97012 MECHANICAL TRACTION THERAPY: CPT | Performed by: PHYSICAL THERAPIST

## 2020-07-27 NOTE — PROGRESS NOTES
Physical Therapy Daily Progress Note      Patient: Sara Solis   : 1940  Referring practitioner: DEBORAH Box  Date of Initial Visit: Type: THERAPY  Noted: 2020  Today's Date: 2020  Patient seen for 9 sessions    Recert due:  20  MD followup:  TBD     Sara Solis reports: 50% improvement  Subjective Questionnaire:       Subjective  Reports hurting worse today and doesn't know anything she did different.  Pre RX pain 5/10.     Objective   See Exercise, Manual, and Modality Logs for complete treatment.       Assessment & Plan     Assessment  Assessment details: Progressed to all seated or standing exercises with good tolerance.  Increased traction by 5#.      Goals  Plan Goals: Plan Goals: Plan Goals: 1.  Patient will have an LEFS score of 40/80- not met  2.  Patient have manual muscle testing of quadriceps 5/5 right LE MET   3.  Patient will have right hip flexor strength 5/5  4.  Patient to maintain pelvic alignment with no LLD R - MET  5.  Patient to maintain level sacral base alignment.  6.  Patient to be non reactive to palpation.- MET  7.  Patient independent in HEP and self care.    LTG  1.  Patient will have right hip abduction strength 5/5  2.  Patient have an LEFS score of 44/80 (36)- progression  3.  Patient will report 60 to 70% overall improvement not met  4.  Patient will be fitted with a lift if leg length discrepancy not corrected MET    Plan  Duration in visits: 16  Plan details: gavin disc seated PN        Visit Diagnoses:    ICD-10-CM ICD-9-CM   1. Greater trochanteric bursitis of right hip M70.61 726.5   2. Right hip pain M25.551 719.45   3. Osteophyte of vertebrae M25.78 721.8   4. Primary osteoarthritis of right knee M17.11 715.16                  Timed:  Manual Therapy:         mins  57078;  Therapeutic Exercise:     38    mins  22199;     Neuromuscular Rafaela:        mins  94620;    Therapeutic Activity:          mins  76338;     Gait Training:            mins  25447;     Ultrasound:          mins  27334;    Electrical Stimulation:         mins  41512 ( );    Untimed:  Electrical Stimulation:         mins  30337 ( );  Mechanical Traction:    15   mins  17963;     Timed Treatment:   38   mins   Total Treatment:    53    mins  Debra Luque PTA  Physical Therapist

## 2020-07-30 ENCOUNTER — TREATMENT (OUTPATIENT)
Dept: PHYSICAL THERAPY | Facility: CLINIC | Age: 80
End: 2020-07-30

## 2020-07-30 DIAGNOSIS — M25.551 RIGHT HIP PAIN: ICD-10-CM

## 2020-07-30 DIAGNOSIS — M17.11 PRIMARY OSTEOARTHRITIS OF RIGHT KNEE: ICD-10-CM

## 2020-07-30 DIAGNOSIS — M25.78 OSTEOPHYTE OF VERTEBRAE: ICD-10-CM

## 2020-07-30 DIAGNOSIS — M70.61 GREATER TROCHANTERIC BURSITIS OF RIGHT HIP: Primary | ICD-10-CM

## 2020-07-30 PROCEDURE — 97110 THERAPEUTIC EXERCISES: CPT | Performed by: PHYSICAL THERAPIST

## 2020-07-30 PROCEDURE — 97012 MECHANICAL TRACTION THERAPY: CPT | Performed by: PHYSICAL THERAPIST

## 2020-07-30 NOTE — PROGRESS NOTES
Physical Therapy Daily Progress Note      Patient: Sara Solis   : 1940  Referring practitioner: DEBORAH Box  Date of Initial Visit: Type: THERAPY  Noted: 2020  Today's Date: 2020  Patient seen for 10 sessions    Recert due:  20  MD followup:  TBD     Sara Solis reports:  50% improvement  Subjective Questionnaire:       Subjective  Reports back pain isn't any better, but R LE is less painful.  Pre RX pain 4/10.     Objective   See Exercise, Manual, and Modality Logs for complete treatment.       Assessment & Plan     Assessment  Assessment details: Pt requires cues for therex performance.  No c/o pain increase during RX.  Increased traction time to 15 minutes.      Goals  Plan Goals: Plan Goals: Plan Goals: Plan Goals: 1.  Patient will have an LEFS score of 40/80- not met  2.  Patient have manual muscle testing of quadriceps 5/5 right LE MET   3.  Patient will have right hip flexor strength 5/5  4.  Patient to maintain pelvic alignment with no LLD R - MET  5.  Patient to maintain level sacral base alignment.  6.  Patient to be non reactive to palpation.- MET  7.  Patient independent in HEP and self care.    LTG  1.  Patient will have right hip abduction strength 5/5  2.  Patient have an LEFS score of 44/80 (36)- progression  3.  Patient will report 60 to 70% overall improvement not met  4.  Patient will be fitted with a lift if leg length discrepancy not corrected MET    Plan  Duration in visits: 16  Plan details: Seated gavin disc         Visit Diagnoses:    ICD-10-CM ICD-9-CM   1. Greater trochanteric bursitis of right hip M70.61 726.5   2. Right hip pain M25.551 719.45   3. Osteophyte of vertebrae M25.78 721.8   4. Primary osteoarthritis of right knee M17.11 715.16                  Timed:  Manual Therapy:         mins  49817;  Therapeutic Exercise:    40     mins  55507;     Neuromuscular Rafaela:        mins  38112;    Therapeutic Activity:          mins   67051;     Gait Training:           mins  21281;     Ultrasound:          mins  60871;    Electrical Stimulation:         mins  53457 ( );    Untimed:  Electrical Stimulation:         mins  36566 ( );  Mechanical Traction:  15       mins  13752;     Timed Treatment: 40    mins   Total Treatment:    55    mins  Debra Luque [facility]  Physical Therapist

## 2020-08-03 ENCOUNTER — TREATMENT (OUTPATIENT)
Dept: PHYSICAL THERAPY | Facility: CLINIC | Age: 80
End: 2020-08-03

## 2020-08-03 DIAGNOSIS — M25.78 OSTEOPHYTE OF VERTEBRAE: ICD-10-CM

## 2020-08-03 DIAGNOSIS — M25.551 RIGHT HIP PAIN: ICD-10-CM

## 2020-08-03 DIAGNOSIS — M17.11 PRIMARY OSTEOARTHRITIS OF RIGHT KNEE: ICD-10-CM

## 2020-08-03 DIAGNOSIS — M70.61 GREATER TROCHANTERIC BURSITIS OF RIGHT HIP: Primary | ICD-10-CM

## 2020-08-03 PROCEDURE — 97012 MECHANICAL TRACTION THERAPY: CPT | Performed by: PHYSICAL THERAPIST

## 2020-08-03 PROCEDURE — 97140 MANUAL THERAPY 1/> REGIONS: CPT | Performed by: PHYSICAL THERAPIST

## 2020-08-03 PROCEDURE — 97110 THERAPEUTIC EXERCISES: CPT | Performed by: PHYSICAL THERAPIST

## 2020-08-03 NOTE — PROGRESS NOTES
Re-Assessment / Re-Certification  2 viPatient: Sara Solis   : 1940  Diagnosis/ICD-10 Code:  Greater trochanteric bursitis of right hip [M70.61]  Referring practitioner: DEBORAH Box  Date of Initial Visit: Type: THERAPY  Noted: 2020  Today's Date: 8/3/2020  Patient seen for 11 sessions  MD 20  recert     Subjective:   Sara Solis reports: Leg pain is some better, back pain not improved.   Subjective Questionnaire: Oswestry: 26%, LEFS 40 ( Oswestry 7% improved, LEFS 4 points better)  Clinical Progress: improved  Home Program Compliance: Yes  Treatment has included: therapeutic exercise, manual therapy and traction    Subjective   Objective   Hip flexor 5-/5 MMT, Posterior right sacral base, no LLD. No TTP at SI joint or GTB  No LLD noted  Assessment/Plan  1.  Patient will have an LEFS score of 40/80-  met  2.  Patient have manual muscle testing of quadriceps 5/5 right LE MET  3.  Patient will have right hip flexor strength 5/5 progressing  4.  Patient to maintain pelvic alignment with no LLD R -  5.  Patient to maintain level sacral base alignment. ongoing  6.  Patient to be non reactive to palpation.- MET  7.  Patient independent in HEP and self care. MET    LTG  1.  Patient will have right hip abduction strength 5/5  2.  Patient have an LEFS score of 44/80 (36)- progression  3.  Patient will report 60 to 70% overall improvement not met  4.  Patient will be fitted with a lift if leg length discrepancy not corrected MET   Plan  Visit Diagnoses:    ICD-10-CM ICD-9-CM   1. Greater trochanteric bursitis of right hip M70.61 726.5   2. Right hip pain M25.551 719.45   3. Osteophyte of vertebrae M25.78 721.8   4. Primary osteoarthritis of right knee M17.11 715.16       Progress toward previous goals: Partially Met  Will continue progression of traction    Recommendations: Continue as planned  Timeframe: 1 week  Prognosis to achieve goals: fair    PT Signature: Zully GUILLEN  Jazzy, AIDEE DPT      Based upon review of the patient's progress and continued therapy plan, it is my medical opinion that Sara Solis should continue physical therapy treatment at [facility]  [address]  [facility] [address] [phone].    Signature: __________________________________  Eddie Mendoza, DEBORAH    Timed:  Manual Therapy:   10      mins  24828;  Therapeutic Exercise:    33     mins  81829;     Neuromuscular Rafaela:        mins  94153;    Therapeutic Activity:          mins  44043;     Gait Training:           mins  89282;     Ultrasound:          mins  39364;    Electrical Stimulation:         mins  19989 ( );    Untimed:  Electrical Stimulation:         mins  87329 ( );  Mechanical Traction:  15       mins  33862;     Timed Treatment:  43    mins   Total Treatment:    58    mins

## 2020-08-20 DIAGNOSIS — Z12.31 SCREENING MAMMOGRAM, ENCOUNTER FOR: ICD-10-CM

## 2020-09-14 ENCOUNTER — TELEPHONE (OUTPATIENT)
Dept: FAMILY MEDICINE CLINIC | Facility: CLINIC | Age: 80
End: 2020-09-14

## 2020-09-14 DIAGNOSIS — G57.93 NEUROPATHY OF BOTH FEET: ICD-10-CM

## 2020-09-14 RX ORDER — GABAPENTIN 600 MG/1
TABLET ORAL
Qty: 270 TABLET | Refills: 0 | Status: SHIPPED | OUTPATIENT
Start: 2020-09-14 | End: 2021-04-06 | Stop reason: SDUPTHER

## 2020-09-14 NOTE — TELEPHONE ENCOUNTER
PATIENT CALLING IN STATING THAT [facility]MEND[facility] FOR HER TO HAVE A BREAST ULTRASOUND, AND THAT SHE WOULD NEED A REFERRAL.     CALL BACK NUMBER: [phone]

## 2020-09-15 DIAGNOSIS — N64.4 BREAST PAIN, RIGHT: Primary | ICD-10-CM

## 2020-10-19 ENCOUNTER — OFFICE VISIT (OUTPATIENT)
Dept: FAMILY MEDICINE CLINIC | Facility: CLINIC | Age: 80
End: 2020-10-19

## 2020-10-19 VITALS
HEART RATE: 82 BPM | BODY MASS INDEX: 28 KG/M2 | OXYGEN SATURATION: 97 % | DIASTOLIC BLOOD PRESSURE: 62 MMHG | SYSTOLIC BLOOD PRESSURE: 144 MMHG | TEMPERATURE: 97.3 F | WEIGHT: 174.2 LBS | HEIGHT: 66 IN

## 2020-10-19 DIAGNOSIS — N64.4 BREAST PAIN: Primary | ICD-10-CM

## 2020-10-19 PROCEDURE — 99213 OFFICE O/P EST LOW 20 MIN: CPT | Performed by: NURSE PRACTITIONER

## 2020-10-19 RX ORDER — VITAMIN E 268 MG
400 CAPSULE ORAL 2 TIMES DAILY
Qty: 60 CAPSULE | Refills: 0 | Status: SHIPPED | OUTPATIENT
Start: 2020-10-19 | End: 2022-07-06

## 2020-10-19 NOTE — PATIENT INSTRUCTIONS
Breast Tenderness  Breast tenderness is a common problem for women of all ages, but may also occur in men. Breast tenderness may range from mild discomfort to severe pain. In women, the pain usually comes and goes with the menstrual cycle, but it can also be constant.  Breast tenderness has many possible causes, including hormone changes, infections, and taking certain medicines. You may have tests, such as a mammogram or an ultrasound, to check for any unusual findings. Having breast tenderness usually does not mean that you have breast cancer.  Follow these instructions at home:  Managing pain and discomfort    · If directed, put ice to the painful area. To do this:  ? Put ice in a plastic bag.  ? Place a towel between your skin and the bag.  ? Leave the ice on for 20 minutes, 2-3 times a day.  · Wear a supportive bra, especially during exercise. You may also want to wear a supportive bra while sleeping if your breasts are very tender.  Medicines  · Take over-the-counter and prescription medicines only as told by your health care provider. If the cause of your pain is infection, you may be prescribed an antibiotic medicine.  · If you were prescribed an antibiotic, take it as told by your health care provider. Do not stop taking the antibiotic even if you start to feel better.  Eating and drinking  · Your health care provider may recommend that you lessen the amount of fat in your diet. You can do this by:  ? Limiting fried foods.  ? Cooking foods using methods such as baking, boiling, grilling, and broiling.  · Decrease the amount of caffeine in your diet. Instead, drink more water and choose caffeine-free drinks.  General instructions    · Keep a log of the days and times when your breasts are most tender.  · Ask your health care provider how to do breast exams at home. This will help you notice if you have an unusual growth or lump.  · Keep all follow-up visits as told by your health care provider. This is  important.  Contact a health care provider if:  · Any part of your breast is hard, red, and hot to the touch. This may be a sign of infection.  · You are a woman and:  ? Not breastfeeding and you have fluid, especially blood or pus, coming out of your nipples.  ? Have a new or painful lump in your breast that remains after your menstrual period ends.  · You have a fever.  · Your pain does not improve or it gets worse.  · Your pain is interfering with your daily activities.  Summary  · Breast tenderness may range from mild discomfort to severe pain.  · Breast tenderness has many possible causes, including hormone changes, infections, and taking certain medicines.  · It can be treated with ice, wearing a supportive bra, and medicines.  · Make changes to your diet if told to by your health care provider.  This information is not intended to replace advice given to you by your health care provider. Make sure you discuss any questions you have with your health care provider.  Document Released: 11/30/2009 Document Revised: 05/11/2020 Document Reviewed: 05/11/2020  [employer] Patient Education © 2020 [employer]

## 2020-10-20 ENCOUNTER — TELEPHONE (OUTPATIENT)
Dept: FAMILY MEDICINE CLINIC | Facility: CLINIC | Age: 80
End: 2020-10-20

## 2020-10-20 NOTE — TELEPHONE ENCOUNTER
PATIENT CALLED AND WANTED TO KNOW WHAT DOSAGE SHE SHOULD BE TAKING OF THE VITAMIN E. SHE WAS TOLD BY LENNY TO TAKE SOME BUT THE PATIENT DOESN'T RECALL HOW MUCH TO TAKE.    PLEASE CALL BACK AND ADVISE:  [phone]

## 2020-12-01 ENCOUNTER — TELEPHONE (OUTPATIENT)
Dept: FAMILY MEDICINE CLINIC | Facility: CLINIC | Age: 80
End: 2020-12-01

## 2020-12-01 RX ORDER — MECLIZINE HCL 12.5 MG/1
12.5 TABLET ORAL 3 TIMES DAILY PRN
Qty: 90 TABLET | Refills: 0 | Status: SHIPPED | OUTPATIENT
Start: 2020-12-01 | End: 2021-10-05 | Stop reason: SDUPTHER

## 2020-12-01 RX ORDER — PROMETHAZINE HYDROCHLORIDE 25 MG/1
25 TABLET ORAL EVERY 6 HOURS PRN
Qty: 30 TABLET | Refills: 0 | Status: SHIPPED | OUTPATIENT
Start: 2020-12-01 | End: 2021-06-11

## 2020-12-01 NOTE — TELEPHONE ENCOUNTER
PATIENT IS REQUESTING RX FOR MECLIZINE FOR DIZZINESS.  SHE BENT OVER AND HAS BEEN DIZZY FROM IT.  RASHEED

## 2020-12-01 NOTE — TELEPHONE ENCOUNTER
Caller: Sara Solis    Relationship: Self    Best call back number: [phone]     Medication needed:   Requested Prescriptions     Pending Prescriptions Disp Refills   • promethazine (PHENERGAN) 25 MG tablet 30 tablet 0       When do you need the refill by: 12/01/20     What details did the patient provide when requesting the medication: PATIENT IS OUT OF THE MEDICATION    Does the patient have less than a 3 day supply:  [x] Yes  [] No    What is the patient's preferred pharmacy: [facility], [address] [phone] [facility] [phone] FX         ”

## 2021-03-15 ENCOUNTER — BULK ORDERING (OUTPATIENT)
Dept: CASE MANAGEMENT | Facility: OTHER | Age: 81
End: 2021-03-15

## 2021-03-15 DIAGNOSIS — Z23 IMMUNIZATION DUE: ICD-10-CM

## 2021-04-06 ENCOUNTER — OFFICE VISIT (OUTPATIENT)
Dept: FAMILY MEDICINE CLINIC | Facility: CLINIC | Age: 81
End: 2021-04-06

## 2021-04-06 VITALS
BODY MASS INDEX: 28.73 KG/M2 | WEIGHT: 178.8 LBS | TEMPERATURE: 97.1 F | HEART RATE: 83 BPM | DIASTOLIC BLOOD PRESSURE: 70 MMHG | SYSTOLIC BLOOD PRESSURE: 138 MMHG | HEIGHT: 66 IN | OXYGEN SATURATION: 97 %

## 2021-04-06 DIAGNOSIS — L29.9 PRURITIC DERMATITIS: Primary | ICD-10-CM

## 2021-04-06 DIAGNOSIS — G57.93 NEUROPATHY OF BOTH FEET: ICD-10-CM

## 2021-04-06 DIAGNOSIS — F41.9 ANXIETY: ICD-10-CM

## 2021-04-06 DIAGNOSIS — B00.1 FEVER BLISTER: ICD-10-CM

## 2021-04-06 DIAGNOSIS — G89.29 CHRONIC RIGHT-SIDED LOW BACK PAIN WITH RIGHT-SIDED SCIATICA: ICD-10-CM

## 2021-04-06 DIAGNOSIS — M54.41 CHRONIC RIGHT-SIDED LOW BACK PAIN WITH RIGHT-SIDED SCIATICA: ICD-10-CM

## 2021-04-06 DIAGNOSIS — M79.2 NERVE PAIN: ICD-10-CM

## 2021-04-06 PROCEDURE — 99214 OFFICE O/P EST MOD 30 MIN: CPT | Performed by: FAMILY MEDICINE

## 2021-04-06 RX ORDER — SERTRALINE HYDROCHLORIDE 100 MG/1
100 TABLET, FILM COATED ORAL DAILY
Qty: 90 TABLET | Refills: 3 | Status: SHIPPED | OUTPATIENT
Start: 2021-04-06 | End: 2021-12-11 | Stop reason: SDUPTHER

## 2021-04-06 RX ORDER — VALACYCLOVIR HYDROCHLORIDE 1 G/1
1000 TABLET, FILM COATED ORAL TAKE AS DIRECTED
Qty: 30 TABLET | Refills: 11 | Status: SHIPPED | OUTPATIENT
Start: 2021-04-06 | End: 2021-07-19

## 2021-04-06 RX ORDER — GABAPENTIN 600 MG/1
TABLET ORAL
Qty: 270 TABLET | Refills: 1 | Status: SHIPPED | OUTPATIENT
Start: 2021-04-06 | End: 2021-12-11 | Stop reason: SDUPTHER

## 2021-04-06 RX ORDER — TRIAMCINOLONE ACETONIDE 1 MG/G
CREAM TOPICAL 3 TIMES DAILY
Qty: 454 G | Refills: 0 | Status: SHIPPED | OUTPATIENT
Start: 2021-04-06 | End: 2021-11-01

## 2021-04-06 RX ORDER — HYDROCODONE BITARTRATE AND ACETAMINOPHEN 7.5; 325 MG/1; MG/1
1 TABLET ORAL EVERY 4 HOURS PRN
Qty: 60 TABLET | Refills: 0 | Status: SHIPPED | OUTPATIENT
Start: 2021-04-06 | End: 2021-07-19 | Stop reason: SDUPTHER

## 2021-04-06 NOTE — PROGRESS NOTES
" Subjective   Sara Solis is a 80 y.o. female.     Chief Complaint   Patient presents with   • Rash             History of Present Illness     Itching and stinging rash right shoulder 4-5 days.  History of zoster groin 1-2 yrs ago.    Injections in back did not help.    Wants zoloft increased  Wants refill norco, gabapentin, valtrex.     Review of Systems   Constitutional: Negative for chills, fatigue and fever.   HENT: Negative for congestion, ear discharge, ear pain, facial swelling, hearing loss, postnasal drip, rhinorrhea, sinus pressure, sore throat, trouble swallowing and voice change.    Eyes: Negative for discharge, redness and visual disturbance.   Respiratory: Negative for cough, chest tightness, shortness of breath and wheezing.    Cardiovascular: Negative for chest pain and palpitations.   Gastrointestinal: Negative for abdominal pain, blood in stool, constipation, diarrhea, nausea and vomiting.   Endocrine: Negative for polydipsia and polyuria.   Genitourinary: Negative for dysuria, flank pain, hematuria and urgency.   Musculoskeletal: Negative for arthralgias, back pain, joint swelling and myalgias.   Skin: Positive for rash.   Neurological: Negative for dizziness, weakness, numbness and headaches.   Hematological: Negative for adenopathy.   Psychiatric/Behavioral: Negative for confusion and sleep disturbance. The patient is not nervous/anxious.            /70 (BP Location: Left arm, Patient Position: Sitting, Cuff Size: Adult)   Pulse 83   Temp 97.1 °F (36.2 °C) (Infrared)   Ht 166.4 cm (65.51\")   Wt 81.1 kg (178 lb 12.8 oz)   SpO2 97%   BMI 29.29 kg/m²       Objective     Physical Exam  Vitals and nursing note reviewed.   Constitutional:       Appearance: Normal appearance. She is well-developed.   HENT:      Head: Normocephalic and atraumatic.      Right Ear: External ear normal.      Left Ear: External ear normal.      Nose: Nose normal.   Eyes:      Extraocular Movements: " Extraocular movements intact.      Conjunctiva/sclera: Conjunctivae normal.      Pupils: Pupils are equal, round, and reactive to light.   Pulmonary:      Effort: Pulmonary effort is normal.   Musculoskeletal:         General: Normal range of motion.      Cervical back: Normal range of motion.   Skin:     Comments: I don't see a rash.  I see 3 small areas of excoriation, I see telangectasia.     Neurological:      General: No focal deficit present.      Mental Status: She is alert and oriented to person, place, and time.   Psychiatric:         Behavior: Behavior normal.         Thought Content: Thought content normal.         Judgment: Judgment normal.             PAST MEDICAL HISTORY     Past Medical History:   Diagnosis Date   • Actinic keratosis    • Allergic rhinitis    • Altered bowel function    • Anxiety state    • Aptyalism    • Attention deficit hyperactivity disorder    • Bunion    • Cough    • Degenerative joint disease involving multiple joints    • Depressive disorder    • Diarrhea    • Disorder of skin    • Fissure in skin    • Foot pain    • Generalized anxiety disorder    • Hepatitis    • Hepatitis C    • Herpes labialis    • History of bone scan 10/01/2008    DXA BONE DENSITY AXIAL 85147 (2) - Osteopenia of the lumbar spine, which has improved compared to the prior study. Normal bone mineral density of the hips, which has improved compared to the prior study   • History of colonoscopy 04/05/2011    Colon endoscopy 37326 (2) - Diverticulosis found in sigmoid colon. Moderate fixation of sigmoid colon. Internal hemorrhoids found in anus.    • History of esophagogastroduodenoscopy 04/05/2011    EGD w/ tube 01343 (1) - Normal hypopharynx, GE junction, duodenum, symmetrical & patent pylorus. Normal esophagus. Dilatation performed. Chronic gastritis found in antrum. Biopsy taken.   • History of mammogram 10/01/2008    Mammogram, both breasts (1) - Negative mammogram. Recommend follow-up in 12 months;     •  Incontinence of feces    • Increased frequency of urination    • Insomnia    • Irritable bowel syndrome    • Knee pain    • Lipoma of shoulder    • Low back pain    • Lumbosacral radiculopathy    • Nausea    • Nervous system abnormality     Disorder of the peripheral nervous system     • Osteopenia    • Pain in limb    • Pain in lower limb    • Plantar fasciitis    • Pure hypercholesterolemia    • Restless legs    • Skin lesion     nose   • Spasm    • Spasm of back muscles    • Squamous cell carcinoma of skin    • Stress fracture    • Tear film insufficiency    • Trochanteric bursitis of right hip    • Upper respiratory infection    • Urinary tract infectious disease    • Vertigo       PAST SURGICAL HISTORY     Past Surgical History:   Procedure Laterality Date   • BREAST BIOPSY  12/04/1995    BIOPSY OF BREAST OPEN 76867 (2) - Right,Exscision of mass.Fibroadenoma. Fibrocystic disease, proliferative   • CATARACT EXTRACTION WITH INTRAOCULAR LENS IMPLANT Right 03/07/2006    Remove cataract, insert lens (1) - right   • DILATATION AND CURETTAGE  10/02/1986    D&C (1) - Fractional D&C laparoscopic tubal sterilization. Plus uterine fibroids, approximately 10-12 weeks in size   • FOOT SURGERY  03/20/1990    Foot/toes surgery procedure (1) - Left,Excisional biopsy. Tumor, supposed fibroma, 5th toe   • HIP PINNING Right 12/21/2017    Procedure: HIP PINNING                 (C-ARM#3);  Surgeon: Basil Ulrich MD;  Location: [facility];  Service:    • INJECTION OF MEDICATION  06/08/2015    Susana (2) - SERENA Robert   • LIVER BIOPSY  04/26/2000    Needle biopsy of liver (1) - percutaneous liver biopsy;     • OTHER SURGICAL HISTORY  10/17/2002    Chest procedure (2) - Intralesional injection of keloid of chest    • OTHER SURGICAL HISTORY  10/25/2001    Remove axillary lymph nodes (1) - Biopsy, Left axillary adenopathy. 4 lymph nodes with reactive hyperplasia No tumor seen   • OTHER SURGICAL HISTORY  06/17/2013    Biopsy,  Each Additional Lesion 79647 (1) - SERENA Robert   • OTHER SURGICAL HISTORY  07/14/2014    Destruction of Premalignant Lesion (1st) 85333 (2)  - SERENA Robert   • OTHER SURGICAL HISTORY  05/10/2016    Drain/Inject Major Joint 87578 (4) - SERENA Robert   • SKIN BIOPSY  07/28/2014    Biopsy of Skin 17875 (2)  - SERENA Robert   • TOTAL ABDOMINAL HYSTERECTOMY WITH SALPINGO OOPHORECTOMY  09/25/1990    incidental appendectomy.Uterine fibroids      SOCIAL HISTORY     Social History     Socioeconomic History   • Marital status:      Spouse name: Not on file   • Number of children: Not on file   • Years of education: Not on file   • Highest education level: Not on file   Tobacco Use   • Smoking status: Never Smoker   • Smokeless tobacco: Never Used   Substance and Sexual Activity   • Alcohol use: No   • Drug use: No   • Sexual activity: Never      ALLERGIES   Patient has no known allergies.   MEDICATIONS     Current Outpatient Medications   Medication Sig Dispense Refill   • cholecalciferol (VITAMIN D3) 1000 UNITS tablet Take 1,000 Units by mouth Daily.     • fluticasone (FLONASE) 50 MCG/ACT nasal spray 2 sprays into the nostril(s) as directed by provider Daily. 1 bottle 2   • gabapentin (NEURONTIN) 600 MG tablet TAKE 1/2 TO 1 TABLET BY MOUTH THREE (3) TIMES DAILY AS NEEDED 270 tablet 1   • HYDROcodone-acetaminophen (NORCO) 7.5-325 MG per tablet Take 1 tablet by mouth Every 4 (Four) Hours As Needed for Mild Pain . 60 tablet 0   • Magnesium 100 MG capsule Take 400 mg by mouth Daily.     • meclizine (ANTIVERT) 12.5 MG tablet Take 1 tablet by mouth 3 (Three) Times a Day As Needed for Dizziness. 90 tablet 0   • pramipexole (MIRAPEX) 0.5 MG tablet Take 2 tablets by mouth Every Night. 180 tablet 3   • promethazine (PHENERGAN) 25 MG tablet Take 1 tablet by mouth Every 6 (Six) Hours As Needed for Nausea or Vomiting. 30 tablet 0   • trimethoprim-polymyxin b (POLYTRIM) 65589-5.1 UNIT/ML-% ophthalmic solution Administer 1 drop to the right eye Every 4  (Four) Hours. 10 mL 0   • vitamin E (vitamin E) 400 UNIT capsule Take 1 capsule by mouth 2 (Two) Times a Day. 60 capsule 0   • lidocaine-hydrocortisone 3-0.5 % cream rectal cream Insert 1 application into the rectum 2 (Two) Times a Day As Needed (HEMORRHOID). 1 tube 11   • loratadine (CLARITIN) 10 MG tablet Take 1 tablet by mouth Daily. 30 tablet 11   • naproxen sodium (ALEVE) 220 MG tablet Take 220 mg by mouth 2 (Two) Times a Day As Needed.     • sertraline (Zoloft) 100 MG tablet Take 1 tablet by mouth Daily. 90 tablet 3   • triamcinolone (KENALOG) 0.1 % cream Apply  topically to the appropriate area as directed 3 (Three) Times a Day. 454 g 0   • valACYclovir (Valtrex) 1000 MG tablet Take 1 tablet by mouth Take As Directed. 30 tablet 11     No current facility-administered medications for this visit.        The following portions of the patient's history were reviewed and updated as appropriate: allergies, current medications, past family history, past medical history, past social history, past surgical history and problem list.        Assessment/Plan   Diagnoses and all orders for this visit:    1. Pruritic dermatitis (Primary)    2. Fever blister  -     valACYclovir (Valtrex) 1000 MG tablet; Take 1 tablet by mouth Take As Directed.  Dispense: 30 tablet; Refill: 11    3. Nerve pain  -     HYDROcodone-acetaminophen (NORCO) 7.5-325 MG per tablet; Take 1 tablet by mouth Every 4 (Four) Hours As Needed for Mild Pain .  Dispense: 60 tablet; Refill: 0    4. Neuropathy of both feet  -     gabapentin (NEURONTIN) 600 MG tablet; TAKE 1/2 TO 1 TABLET BY MOUTH THREE (3) TIMES DAILY AS NEEDED  Dispense: 270 tablet; Refill: 1    5. Chronic right-sided low back pain with right-sided sciatica    6. Anxiety    Other orders  -     sertraline (Zoloft) 100 MG tablet; Take 1 tablet by mouth Daily.  Dispense: 90 tablet; Refill: 3  -     triamcinolone (KENALOG) 0.1 % cream; Apply  topically to the appropriate area as directed 3 (Three)  Times a Day.  Dispense: 454 g; Refill: 0    I see skin irritation right shoulder, I don't believe is zoster.  She says she itches mulitple other areas since going to dermatologist.  Try the triamcinolone cream    Increased zoloft. For anxiety    Fever blister preventative working,     norco for back pain and other neuropathic pain                       No follow-ups on file.                  This document has been electronically signed by Sulaiman Robert MD on April 6, 2021 12:29 CDT

## 2021-04-14 ENCOUNTER — OFFICE VISIT (OUTPATIENT)
Dept: FAMILY MEDICINE CLINIC | Facility: CLINIC | Age: 81
End: 2021-04-14

## 2021-04-14 VITALS
SYSTOLIC BLOOD PRESSURE: 128 MMHG | DIASTOLIC BLOOD PRESSURE: 60 MMHG | TEMPERATURE: 97.5 F | BODY MASS INDEX: 28.61 KG/M2 | WEIGHT: 178 LBS | HEART RATE: 87 BPM | HEIGHT: 66 IN | OXYGEN SATURATION: 95 %

## 2021-04-14 DIAGNOSIS — R68.89 ITCHY EYES: ICD-10-CM

## 2021-04-14 DIAGNOSIS — H04.123 INSUFFICIENCY OF TEAR FILM OF BOTH EYES: Primary | ICD-10-CM

## 2021-04-14 PROCEDURE — 99213 OFFICE O/P EST LOW 20 MIN: CPT | Performed by: FAMILY MEDICINE

## 2021-04-14 RX ORDER — METHYLPREDNISOLONE 4 MG/1
TABLET ORAL
Qty: 21 TABLET | Refills: 0 | Status: SHIPPED | OUTPATIENT
Start: 2021-04-14 | End: 2021-07-19

## 2021-04-14 NOTE — PROGRESS NOTES
" Subjective   Sara Solis is a 80 y.o. female.     Chief Complaint   Patient presents with   • Eye Problem             History of Present Illness     Itchy eyes.   Using artificial tears  Using ketotifen eye drops  Not helping.   Also  Told  Had a murmur.  Wanted me to listen     Review of Systems   Constitutional: Negative for chills, fatigue and fever.   HENT: Negative for congestion, ear discharge, ear pain, facial swelling, hearing loss, postnasal drip, rhinorrhea, sinus pressure, sore throat, trouble swallowing and voice change.    Eyes: Positive for itching. Negative for discharge, redness and visual disturbance.   Respiratory: Negative for cough, chest tightness, shortness of breath and wheezing.    Cardiovascular: Negative for chest pain and palpitations.   Gastrointestinal: Negative for abdominal pain, blood in stool, constipation, diarrhea, nausea and vomiting.   Endocrine: Negative for polydipsia and polyuria.   Genitourinary: Negative for dysuria, flank pain, hematuria and urgency.   Musculoskeletal: Negative for arthralgias, back pain, joint swelling and myalgias.   Skin: Negative for rash.   Neurological: Negative for dizziness, weakness, numbness and headaches.   Hematological: Negative for adenopathy.   Psychiatric/Behavioral: Negative for confusion and sleep disturbance. The patient is not nervous/anxious.            /60 (BP Location: Left arm, Patient Position: Sitting, Cuff Size: Adult)   Pulse 87   Temp 97.5 °F (36.4 °C) (Infrared)   Ht 166.4 cm (65.51\")   Wt 80.7 kg (178 lb)   SpO2 95%   BMI 29.16 kg/m²       Objective     Physical Exam  Vitals and nursing note reviewed.   Constitutional:       Appearance: Normal appearance. She is well-developed.   HENT:      Head: Normocephalic and atraumatic.      Right Ear: External ear normal.      Left Ear: External ear normal.      Nose: Nose normal.   Eyes:      Extraocular Movements: Extraocular movements intact.      " Conjunctiva/sclera: Conjunctivae normal.      Pupils: Pupils are equal, round, and reactive to light.      Comments: Eyes little dry, no redness.    Pulmonary:      Effort: Pulmonary effort is normal.   Musculoskeletal:         General: Normal range of motion.      Cervical back: Normal range of motion.   Neurological:      General: No focal deficit present.      Mental Status: She is alert and oriented to person, place, and time.   Psychiatric:         Behavior: Behavior normal.         Thought Content: Thought content normal.         Judgment: Judgment normal.             PAST MEDICAL HISTORY     Past Medical History:   Diagnosis Date   • Actinic keratosis    • Allergic rhinitis    • Altered bowel function    • Anxiety state    • Aptyalism    • Attention deficit hyperactivity disorder    • Bunion    • Cough    • Degenerative joint disease involving multiple joints    • Depressive disorder    • Diarrhea    • Disorder of skin    • Fissure in skin    • Foot pain    • Generalized anxiety disorder    • Hepatitis    • Hepatitis C    • Herpes labialis    • History of bone scan 10/01/2008    DXA BONE DENSITY AXIAL 45293 (2) - Osteopenia of the lumbar spine, which has improved compared to the prior study. Normal bone mineral density of the hips, which has improved compared to the prior study   • History of colonoscopy 04/05/2011    Colon endoscopy 52118 (2) - Diverticulosis found in sigmoid colon. Moderate fixation of sigmoid colon. Internal hemorrhoids found in anus.    • History of esophagogastroduodenoscopy 04/05/2011    EGD w/ tube 16657 (1) - Normal hypopharynx, GE junction, duodenum, symmetrical & patent pylorus. Normal esophagus. Dilatation performed. Chronic gastritis found in antrum. Biopsy taken.   • History of mammogram 10/01/2008    Mammogram, both breasts (1) - Negative mammogram. Recommend follow-up in 12 months;     • Incontinence of feces    • Increased frequency of urination    • Insomnia    • Irritable  bowel syndrome    • Knee pain    • Lipoma of shoulder    • Low back pain    • Lumbosacral radiculopathy    • Nausea    • Nervous system abnormality     Disorder of the peripheral nervous system     • Osteopenia    • Pain in limb    • Pain in lower limb    • Plantar fasciitis    • Pure hypercholesterolemia    • Restless legs    • Skin lesion     nose   • Spasm    • Spasm of back muscles    • Squamous cell carcinoma of skin    • Stress fracture    • Tear film insufficiency    • Trochanteric bursitis of right hip    • Upper respiratory infection    • Urinary tract infectious disease    • Vertigo       PAST SURGICAL HISTORY     Past Surgical History:   Procedure Laterality Date   • BREAST BIOPSY  12/04/1995    BIOPSY OF BREAST OPEN 77439 (2) - Right,Exscision of mass.Fibroadenoma. Fibrocystic disease, proliferative   • CATARACT EXTRACTION WITH INTRAOCULAR LENS IMPLANT Right 03/07/2006    Remove cataract, insert lens (1) - right   • DILATATION AND CURETTAGE  10/02/1986    D&C (1) - Fractional D&C laparoscopic tubal sterilization. Plus uterine fibroids, approximately 10-12 weeks in size   • FOOT SURGERY  03/20/1990    Foot/toes surgery procedure (1) - Left,Excisional biopsy. Tumor, supposed fibroma, 5th toe   • HIP PINNING Right 12/21/2017    Procedure: HIP PINNING                 (C-ARM#3);  Surgeon: Basil Ulrich MD;  Location: [facility] OR;  Service:    • INJECTION OF MEDICATION  06/08/2015    Kenalog (2) - SERENA Robert   • LIVER BIOPSY  04/26/2000    Needle biopsy of liver (1) - percutaneous liver biopsy;     • OTHER SURGICAL HISTORY  10/17/2002    Chest procedure (2) - Intralesional injection of keloid of chest    • OTHER SURGICAL HISTORY  10/25/2001    Remove axillary lymph nodes (1) - Biopsy, Left axillary adenopathy. 4 lymph nodes with reactive hyperplasia No tumor seen   • OTHER SURGICAL HISTORY  06/17/2013    Biopsy, Each Additional Lesion 99997 (1) - SERENA Robert   • OTHER SURGICAL HISTORY  07/14/2014     Destruction of Premalignant Lesion (1st) 54011 (2)  - SERENA Robert   • OTHER SURGICAL HISTORY  05/10/2016    Drain/Inject Major Joint 02940 (4) - SERENA Robert   • SKIN BIOPSY  07/28/2014    Biopsy of Skin 06347 (2)  - SERENA Robert   • TOTAL ABDOMINAL HYSTERECTOMY WITH SALPINGO OOPHORECTOMY  09/25/1990    incidental appendectomy.Uterine fibroids      SOCIAL HISTORY     Social History     Socioeconomic History   • Marital status:      Spouse name: Not on file   • Number of children: Not on file   • Years of education: Not on file   • Highest education level: Not on file   Tobacco Use   • Smoking status: Never Smoker   • Smokeless tobacco: Never Used   Substance and Sexual Activity   • Alcohol use: No   • Drug use: No   • Sexual activity: Never      ALLERGIES   Patient has no known allergies.   MEDICATIONS     Current Outpatient Medications   Medication Sig Dispense Refill   • cholecalciferol (VITAMIN D3) 1000 UNITS tablet Take 1,000 Units by mouth Daily.     • fluticasone (FLONASE) 50 MCG/ACT nasal spray 2 sprays into the nostril(s) as directed by provider Daily. 1 bottle 2   • gabapentin (NEURONTIN) 600 MG tablet TAKE 1/2 TO 1 TABLET BY MOUTH THREE (3) TIMES DAILY AS NEEDED 270 tablet 1   • HYDROcodone-acetaminophen (NORCO) 7.5-325 MG per tablet Take 1 tablet by mouth Every 4 (Four) Hours As Needed for Mild Pain . 60 tablet 0   • lidocaine-hydrocortisone 3-0.5 % cream rectal cream Insert 1 application into the rectum 2 (Two) Times a Day As Needed (HEMORRHOID). 1 tube 11   • loratadine (CLARITIN) 10 MG tablet Take 1 tablet by mouth Daily. 30 tablet 11   • Magnesium 100 MG capsule Take 400 mg by mouth Daily.     • pramipexole (MIRAPEX) 0.5 MG tablet Take 2 tablets by mouth Every Night. 180 tablet 3   • sertraline (Zoloft) 100 MG tablet Take 1 tablet by mouth Daily. 90 tablet 3   • triamcinolone (KENALOG) 0.1 % cream Apply  topically to the appropriate area as directed 3 (Three) Times a Day. 454 g 0   • trimethoprim-polymyxin b  (POLYTRIM) 73707-2.1 UNIT/ML-% ophthalmic solution Administer 1 drop to the right eye Every 4 (Four) Hours. 10 mL 0   • valACYclovir (Valtrex) 1000 MG tablet Take 1 tablet by mouth Take As Directed. 30 tablet 11   • vitamin E (vitamin E) 400 UNIT capsule Take 1 capsule by mouth 2 (Two) Times a Day. 60 capsule 0   • meclizine (ANTIVERT) 12.5 MG tablet Take 1 tablet by mouth 3 (Three) Times a Day As Needed for Dizziness. 90 tablet 0   • methylPREDNISolone (MEDROL) 4 MG dose pack Take as directed on package instructions. 21 tablet 0   • naproxen sodium (ALEVE) 220 MG tablet Take 220 mg by mouth 2 (Two) Times a Day As Needed.     • promethazine (PHENERGAN) 25 MG tablet Take 1 tablet by mouth Every 6 (Six) Hours As Needed for Nausea or Vomiting. 30 tablet 0     No current facility-administered medications for this visit.        The following portions of the patient's history were reviewed and updated as appropriate: allergies, current medications, past family history, past medical history, past social history, past surgical history and problem list.        Assessment/Plan   Diagnoses and all orders for this visit:    1. Insufficiency of tear film of both eyes (Primary)    2. Itchy eyes    Other orders  -     methylPREDNISolone (MEDROL) 4 MG dose pack; Take as directed on package instructions.  Dispense: 21 tablet; Refill: 0      Use tears more often  Continue ketotifen  Wet washcloth lay over eyes  Medrol.                  No follow-ups on file.                  This document has been electronically signed by Sulaiman Robert MD on April 14, 2021 09:44 CDT

## 2021-06-11 RX ORDER — PROMETHAZINE HYDROCHLORIDE 25 MG/1
TABLET ORAL
Qty: 30 TABLET | Refills: 0 | Status: SHIPPED | OUTPATIENT
Start: 2021-06-11 | End: 2022-11-10

## 2021-07-19 ENCOUNTER — OFFICE VISIT (OUTPATIENT)
Dept: FAMILY MEDICINE CLINIC | Facility: CLINIC | Age: 81
End: 2021-07-19

## 2021-07-19 VITALS
OXYGEN SATURATION: 95 % | WEIGHT: 174.8 LBS | DIASTOLIC BLOOD PRESSURE: 80 MMHG | HEIGHT: 66 IN | HEART RATE: 89 BPM | SYSTOLIC BLOOD PRESSURE: 148 MMHG | TEMPERATURE: 96.9 F | BODY MASS INDEX: 28.09 KG/M2

## 2021-07-19 DIAGNOSIS — R10.30 LOWER ABDOMINAL PAIN: Primary | ICD-10-CM

## 2021-07-19 DIAGNOSIS — Z13.220 SCREENING FOR CHOLESTEROL LEVEL: ICD-10-CM

## 2021-07-19 DIAGNOSIS — M79.2 NERVE PAIN: ICD-10-CM

## 2021-07-19 DIAGNOSIS — Z23 NEED FOR TDAP VACCINATION: ICD-10-CM

## 2021-07-19 DIAGNOSIS — G89.29 CHRONIC PAIN OF RIGHT KNEE: ICD-10-CM

## 2021-07-19 DIAGNOSIS — M54.41 CHRONIC RIGHT-SIDED LOW BACK PAIN WITH RIGHT-SIDED SCIATICA: ICD-10-CM

## 2021-07-19 DIAGNOSIS — S90.561A INSECT BITE OF RIGHT ANKLE, INITIAL ENCOUNTER: ICD-10-CM

## 2021-07-19 DIAGNOSIS — G89.29 CHRONIC RIGHT-SIDED LOW BACK PAIN WITH RIGHT-SIDED SCIATICA: ICD-10-CM
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
## 2021-07-19 DIAGNOSIS — M25.561 CHRONIC PAIN OF RIGHT KNEE: ICD-10-CM

## 2021-07-19 PROCEDURE — 99213 OFFICE O/P EST LOW 20 MIN: CPT | Performed by: NURSE PRACTITIONER

## 2021-07-19 PROCEDURE — 90715 TDAP VACCINE 7 YRS/> IM: CPT | Performed by: NURSE PRACTITIONER

## 2021-07-19 PROCEDURE — 90471 IMMUNIZATION ADMIN: CPT | Performed by: NURSE PRACTITIONER

## 2021-07-19 RX ORDER — HYDROCODONE BITARTRATE AND ACETAMINOPHEN 7.5; 325 MG/1; MG/1
1 TABLET ORAL EVERY 6 HOURS PRN
Qty: 60 TABLET | Refills: 0 | Status: SHIPPED | OUTPATIENT
Start: 2021-07-19 | End: 2021-09-16 | Stop reason: SDUPTHER

## 2021-07-19 NOTE — PROGRESS NOTES
Subjective   Sara Solis is a 80 y.o. female. Ankle pain (right)    History of Present Illness   Patient presents today for right ankle pain. Started on Thursday. She says she has been outside a lot picking blackberries. Noticed a place on her right ankle yesterday and asked her grandson to look at it because she was worried it was a tick. He looked at it and informed her that it was not a tick. Pt also experiencing some stomach pain. This pain started a few weeks ago. Pain is located in her lower abdomen. Reports one episode of diarrhea last night. Denies any dysuria, hematuria, constipation, darkened stools, or hematochezia.     The following portions of the patient's history were reviewed and updated as appropriate: allergies, current medications, past family history, past medical history, past social history, past surgical history, and problem list.    Review of Systems   Constitutional: Negative for chills, fatigue and fever.   HENT: Negative for congestion, ear pain, rhinorrhea and sore throat.    Eyes: Negative for blurred vision, double vision and visual disturbance.   Respiratory: Negative for cough, chest tightness, shortness of breath and wheezing.    Cardiovascular: Negative for chest pain, palpitations and leg swelling.   Gastrointestinal: Negative for abdominal pain, diarrhea, nausea and vomiting.   Endocrine: Negative for cold intolerance and heat intolerance.   Genitourinary: Negative for difficulty urinating, dysuria, frequency and hematuria.   Musculoskeletal: Positive for arthralgias (right ankle) and joint swelling (right ankle). Negative for back pain, neck pain and neck stiffness.   Skin: Negative for dry skin, pallor, rash, skin lesions and bruise.   Allergic/Immunologic: Negative for environmental allergies, food allergies and immunocompromised state.   Neurological: Negative for dizziness, syncope, weakness, light-headedness, headache and confusion.   Hematological: Negative for  "adenopathy. Does not bruise/bleed easily.   Psychiatric/Behavioral: Negative for self-injury, sleep disturbance, suicidal ideas, depressed mood and stress. The patient is not nervous/anxious.      Vitals:    07/19/21 0901 07/19/21 0927   BP: 158/70 148/80   BP Location: Right arm    Patient Position: Sitting    Cuff Size: Adult    Pulse: 89    Temp: 96.9 °F (36.1 °C)    TempSrc: Temporal    SpO2: 95%    Weight: 79.3 kg (174 lb 12.8 oz)    Height: 166.4 cm (65.51\")    PainSc: 0-No pain          Body mass index is 28.64 kg/m².    Objective   Physical Exam  Vitals and nursing note reviewed.   Constitutional:       General: She is not in acute distress.     Appearance: She is well-developed. She is not ill-appearing.   HENT:      Head: Normocephalic.   Eyes:      Conjunctiva/sclera: Conjunctivae normal.   Cardiovascular:      Rate and Rhythm: Normal rate and regular rhythm.      Heart sounds: Normal heart sounds, S1 normal and S2 normal. No murmur heard.     Pulmonary:      Effort: Pulmonary effort is normal.      Breath sounds: Normal breath sounds and air entry. No decreased breath sounds, wheezing, rhonchi or rales.   Abdominal:      General: Abdomen is flat. Bowel sounds are normal.      Palpations: Abdomen is soft.      Tenderness: There is no abdominal tenderness.   Musculoskeletal:         General: Normal range of motion.      Cervical back: Full passive range of motion without pain, normal range of motion and neck supple.   Skin:     General: Skin is warm and dry.          Neurological:      Mental Status: She is alert and oriented to person, place, and time.      Coordination: Coordination is intact. Coordination normal.      Gait: Gait is intact. Gait normal.   Psychiatric:         Attention and Perception: Attention normal.         Mood and Affect: Mood normal.         Speech: Speech normal.         Behavior: Behavior normal. Behavior is cooperative.         Thought Content: Thought content normal.         " Cognition and Memory: Cognition normal.         Judgment: Judgment normal.           Assessment/Plan   Diagnoses and all orders for this visit:    1. Lower abdominal pain (Primary)  -     CBC (No Diff)  -     Comprehensive metabolic panel    2. Insect bite of right ankle, initial encounter  -     mupirocin (Bactroban) 2 % ointment; Apply  topically to the appropriate area as directed 2 (Two) Times a Day.  Dispense: 15 g; Refill: 0    3. Nerve pain  -     HYDROcodone-acetaminophen (NORCO) 7.5-325 MG per tablet; Take 1 tablet by mouth Every 6 (Six) Hours As Needed for Mild Pain  or Moderate Pain .  Dispense: 60 tablet; Refill: 0    4. Chronic right-sided low back pain with right-sided sciatica  -     HYDROcodone-acetaminophen (NORCO) 7.5-325 MG per tablet; Take 1 tablet by mouth Every 6 (Six) Hours As Needed for Mild Pain  or Moderate Pain .  Dispense: 60 tablet; Refill: 0    5. Chronic pain of right knee  -     HYDROcodone-acetaminophen (NORCO) 7.5-325 MG per tablet; Take 1 tablet by mouth Every 6 (Six) Hours As Needed for Mild Pain  or Moderate Pain .  Dispense: 60 tablet; Refill: 0    6. Need for Tdap vaccination  -     Tdap Vaccine Greater Than or Equal To 8yo IM    7. Screening for cholesterol level  -     Lipid Panel    Lower abdominal pain- labs and stool card provided for further evaluation. No abdominal tenderness noted on exam.  Insect bite of right ankle- No signs of infection noted at this time. Pt instructed to apply bactroban ointment BID for 7-10 days.  Refilled Norco to take as directed PRN for chronic right knee and back pain. Warned pt that it can cause constipation.  Tdap vaccine given today.  Checking lipid panel for cholesterol screening, will call pt with results.  If symptoms do not improve or worsen, patient was instructed to return to clinic for further evaluation.         This document has been electronically signed by DEBORAH Garg on  July 19, 2021 09:41 CDT

## 2021-07-20 ENCOUNTER — LAB (OUTPATIENT)
Dept: LAB | Facility: HOSPITAL | Age: 81
End: 2021-07-20

## 2021-07-20 PROCEDURE — 80053 COMPREHEN METABOLIC PANEL: CPT | Performed by: NURSE PRACTITIONER

## 2021-07-20 PROCEDURE — 80061 LIPID PANEL: CPT | Performed by: NURSE PRACTITIONER

## 2021-07-20 PROCEDURE — 85027 COMPLETE CBC AUTOMATED: CPT | Performed by: NURSE PRACTITIONER

## 2021-07-21 LAB
ALBUMIN SERPL-MCNC: 4.4 G/DL (ref 3.5–5.2)
ALBUMIN/GLOB SERPL: 1.5 G/DL
ALP SERPL-CCNC: 80 U/L (ref 39–117)
ALT SERPL W P-5'-P-CCNC: 17 U/L (ref 1–33)
ANION GAP SERPL CALCULATED.3IONS-SCNC: 11 MMOL/L (ref 5–15)
AST SERPL-CCNC: 20 U/L (ref 1–32)
BILIRUB SERPL-MCNC: 0.3 MG/DL (ref 0–1.2)
BUN SERPL-MCNC: 21 MG/DL (ref 8–23)
BUN/CREAT SERPL: 26.6 (ref 7–25)
CALCIUM SPEC-SCNC: 9.1 MG/DL (ref 8.6–10.5)
CHLORIDE SERPL-SCNC: 101 MMOL/L (ref 98–107)
CHOLEST SERPL-MCNC: 246 MG/DL (ref 0–200)
CO2 SERPL-SCNC: 26 MMOL/L (ref 22–29)
CREAT SERPL-MCNC: 0.79 MG/DL (ref 0.57–1)
DEPRECATED RDW RBC AUTO: 43.2 FL (ref 37–54)
ERYTHROCYTE [DISTWIDTH] IN BLOOD BY AUTOMATED COUNT: 13.4 % (ref 12.3–15.4)
GFR SERPL CREATININE-BSD FRML MDRD: 70 ML/MIN/1.73
GLOBULIN UR ELPH-MCNC: 3 GM/DL
GLUCOSE SERPL-MCNC: 107 MG/DL (ref 65–99)
HCT VFR BLD AUTO: 40.9 % (ref 34–46.6)
HDLC SERPL-MCNC: 51 MG/DL (ref 40–60)
HGB BLD-MCNC: 13.3 G/DL (ref 12–15.9)
LDLC SERPL CALC-MCNC: 173 MG/DL (ref 0–100)
LDLC/HDLC SERPL: 3.33 {RATIO}
MCH RBC QN AUTO: 28.4 PG (ref 26.6–33)
MCHC RBC AUTO-ENTMCNC: 32.5 G/DL (ref 31.5–35.7)
MCV RBC AUTO: 87.4 FL (ref 79–97)
PLATELET # BLD AUTO: 264 10*3/MM3 (ref 140–450)
PMV BLD AUTO: 10.5 FL (ref 6–12)
POTASSIUM SERPL-SCNC: 4.5 MMOL/L (ref 3.5–5.2)
PROT SERPL-MCNC: 7.4 G/DL (ref 6–8.5)
RBC # BLD AUTO: 4.68 10*6/MM3 (ref 3.77–5.28)
SODIUM SERPL-SCNC: 138 MMOL/L (ref 136–145)
TRIGL SERPL-MCNC: 125 MG/DL (ref 0–150)
VLDLC SERPL-MCNC: 22 MG/DL (ref 5–40)
WBC # BLD AUTO: 8.69 10*3/MM3 (ref 3.4–10.8)

## 2021-08-11 RX ORDER — PRAMIPEXOLE DIHYDROCHLORIDE 0.5 MG/1
1 TABLET ORAL NIGHTLY
Qty: 180 TABLET | Refills: 2 | Status: SHIPPED | OUTPATIENT
Start: 2021-08-11 | End: 2021-12-11 | Stop reason: SDUPTHER

## 2021-09-16 ENCOUNTER — OFFICE VISIT (OUTPATIENT)
Dept: FAMILY MEDICINE CLINIC | Facility: CLINIC | Age: 81
End: 2021-09-16

## 2021-09-16 VITALS
BODY MASS INDEX: 28.96 KG/M2 | SYSTOLIC BLOOD PRESSURE: 124 MMHG | TEMPERATURE: 98.2 F | OXYGEN SATURATION: 96 % | DIASTOLIC BLOOD PRESSURE: 82 MMHG | HEIGHT: 66 IN | HEART RATE: 83 BPM | WEIGHT: 180.2 LBS

## 2021-09-16 DIAGNOSIS — M79.671 RIGHT FOOT PAIN: Primary | ICD-10-CM

## 2021-09-16 DIAGNOSIS — G89.29 CHRONIC RIGHT-SIDED LOW BACK PAIN WITH RIGHT-SIDED SCIATICA: ICD-10-CM

## 2021-09-16 DIAGNOSIS — G89.29 CHRONIC PAIN OF RIGHT KNEE: ICD-10-CM

## 2021-09-16 DIAGNOSIS — M79.2 NERVE PAIN: ICD-10-CM

## 2021-09-16 DIAGNOSIS — M54.41 CHRONIC RIGHT-SIDED LOW BACK PAIN WITH RIGHT-SIDED SCIATICA: ICD-10-CM

## 2021-09-16 DIAGNOSIS — M25.561 CHRONIC PAIN OF RIGHT KNEE: ICD-10-CM

## 2021-09-16 PROCEDURE — 99213 OFFICE O/P EST LOW 20 MIN: CPT | Performed by: FAMILY MEDICINE

## 2021-09-16 RX ORDER — HYDROCODONE BITARTRATE AND ACETAMINOPHEN 7.5; 325 MG/1; MG/1
1 TABLET ORAL EVERY 6 HOURS PRN
Qty: 60 TABLET | Refills: 0 | Status: SHIPPED | OUTPATIENT
Start: 2021-09-16 | End: 2021-12-11 | Stop reason: SDUPTHER

## 2021-09-16 NOTE — PROGRESS NOTES
" Subjective   Sara Arlet Solis is a 80 y.o. female.     Chief Complaint   Patient presents with   • Foot Pain     RIGHT             History of Present Illness     Right foot pain 3-4 weeks, cant sleep at night.  Taking a pain pill at night not helping.  No recent trauma.  She has seen martha in the past.  The pain is top of her foot at [facility] area.   Also  Wants handicap parking permit  Also  At night, \"feels like\" she needs to stretch right leg out?    Review of Systems   Constitutional: Negative for chills, fatigue and fever.   HENT: Negative for congestion, ear discharge, ear pain, facial swelling, hearing loss, postnasal drip, rhinorrhea, sinus pressure, sore throat, trouble swallowing and voice change.    Eyes: Negative for discharge, redness and visual disturbance.   Respiratory: Negative for cough, chest tightness, shortness of breath and wheezing.    Cardiovascular: Negative for chest pain and palpitations.   Gastrointestinal: Negative for abdominal pain, blood in stool, constipation, diarrhea, nausea and vomiting.   Endocrine: Negative for polydipsia and polyuria.   Genitourinary: Negative for dysuria, flank pain, hematuria and urgency.   Musculoskeletal: Positive for arthralgias. Negative for back pain, joint swelling and myalgias.   Skin: Negative for rash.   Neurological: Negative for dizziness, weakness, numbness and headaches.   Hematological: Negative for adenopathy.   Psychiatric/Behavioral: Negative for confusion and sleep disturbance. The patient is not nervous/anxious.            /82 (BP Location: Right arm, Patient Position: Sitting, Cuff Size: Adult)   Pulse 83   Temp 98.2 °F (36.8 °C) (Temporal)   Ht 166.4 cm (65.51\")   Wt 81.7 kg (180 lb 3.2 oz)   SpO2 96%   BMI 29.52 kg/m²       Objective     Physical Exam  Vitals and nursing note reviewed.   Constitutional:       Appearance: Normal appearance. She is well-developed.   HENT:      Head: Normocephalic and atraumatic.      Right Ear: " External ear normal.      Left Ear: External ear normal.      Nose: Nose normal.   Eyes:      Extraocular Movements: Extraocular movements intact.      Conjunctiva/sclera: Conjunctivae normal.      Pupils: Pupils are equal, round, and reactive to light.   Pulmonary:      Effort: Pulmonary effort is normal.   Musculoskeletal:         General: Normal range of motion.      Cervical back: Normal range of motion.      Comments: Some bony prominence top of foot.  No erythema.    Neurological:      General: No focal deficit present.      Mental Status: She is alert and oriented to person, place, and time.   Psychiatric:         Behavior: Behavior normal.         Thought Content: Thought content normal.         Judgment: Judgment normal.             PAST MEDICAL HISTORY     Past Medical History:   Diagnosis Date   • Actinic keratosis    • Allergic rhinitis    • Altered bowel function    • Anxiety state    • Aptyalism    • Attention deficit hyperactivity disorder    • Bunion    • Cough    • Degenerative joint disease involving multiple joints    • Depressive disorder    • Diarrhea    • Disorder of skin    • Fissure in skin    • Foot pain    • Generalized anxiety disorder    • Hepatitis    • Hepatitis C    • Herpes labialis    • History of bone scan 10/01/2008    DXA BONE DENSITY AXIAL 12104 (2) - Osteopenia of the lumbar spine, which has improved compared to the prior study. Normal bone mineral density of the hips, which has improved compared to the prior study   • History of colonoscopy 04/05/2011    Colon endoscopy 46168 (2) - Diverticulosis found in sigmoid colon. Moderate fixation of sigmoid colon. Internal hemorrhoids found in anus.    • History of esophagogastroduodenoscopy 04/05/2011    EGD w/ tube 38292 (1) - Normal hypopharynx, GE junction, duodenum, symmetrical & patent pylorus. Normal esophagus. Dilatation performed. Chronic gastritis found in antrum. Biopsy taken.   • History of mammogram 10/01/2008    Mammogram,  both breasts (1) - Negative mammogram. Recommend follow-up in 12 months;     • Incontinence of feces    • Increased frequency of urination    • Insomnia    • Irritable bowel syndrome    • Knee pain    • Lipoma of shoulder    • Low back pain    • Lumbosacral radiculopathy    • Nausea    • Nervous system abnormality     Disorder of the peripheral nervous system     • Osteopenia    • Pain in limb    • Pain in lower limb    • Plantar fasciitis    • Pure hypercholesterolemia    • Restless legs    • Skin lesion     nose   • Spasm    • Spasm of back muscles    • Squamous cell carcinoma of skin    • Stress fracture    • Tear film insufficiency    • Trochanteric bursitis of right hip    • Upper respiratory infection    • Urinary tract infectious disease    • Vertigo       PAST SURGICAL HISTORY     Past Surgical History:   Procedure Laterality Date   • BREAST BIOPSY  12/04/1995    BIOPSY OF BREAST OPEN 87736 (2) - Right,Exscision of mass.Fibroadenoma. Fibrocystic disease, proliferative   • CATARACT EXTRACTION WITH INTRAOCULAR LENS IMPLANT Right 03/07/2006    Remove cataract, insert lens (1) - right   • DILATATION AND CURETTAGE  10/02/1986    D&C (1) - Fractional D&C laparoscopic tubal sterilization. Plus uterine fibroids, approximately 10-12 weeks in size   • FOOT SURGERY  03/20/1990    Foot/toes surgery procedure (1) - Left,Excisional biopsy. Tumor, supposed fibroma, 5th toe   • HIP PINNING Right 12/21/2017    Procedure: HIP PINNING                 (C-ARM#3);  Surgeon: Basil Ulrich MD;  Location: [facility];  Service:    • INJECTION OF MEDICATION  06/08/2015    Susana (2) - SERENA Robert   • LIVER BIOPSY  04/26/2000    Needle biopsy of liver (1) - percutaneous liver biopsy;     • OTHER SURGICAL HISTORY  10/17/2002    Chest procedure (2) - Intralesional injection of keloid of chest    • OTHER SURGICAL HISTORY  10/25/2001    Remove axillary lymph nodes (1) - Biopsy, Left axillary adenopathy. 4 lymph nodes with reactive  hyperplasia No tumor seen   • OTHER SURGICAL HISTORY  06/17/2013    Biopsy, Each Additional Lesion 44725 (1) - SERENA Robert   • OTHER SURGICAL HISTORY  07/14/2014    Destruction of Premalignant Lesion (1st) 31065 (2)  - SERENA Robert   • OTHER SURGICAL HISTORY  05/10/2016    Drain/Inject Major Joint 90278 (4) - SERENA Robert   • SKIN BIOPSY  07/28/2014    Biopsy of Skin 62272 (2)  - SERENA Robert   • TOTAL ABDOMINAL HYSTERECTOMY WITH SALPINGO OOPHORECTOMY  09/25/1990    incidental appendectomy.Uterine fibroids      SOCIAL HISTORY     Social History     Socioeconomic History   • Marital status:      Spouse name: Not on file   • Number of children: Not on file   • Years of education: Not on file   • Highest education level: Not on file   Tobacco Use   • Smoking status: Never Smoker   • Smokeless tobacco: Never Used   Substance and Sexual Activity   • Alcohol use: No   • Drug use: No   • Sexual activity: Never      ALLERGIES   Patient has no known allergies.   MEDICATIONS     Current Outpatient Medications   Medication Sig Dispense Refill   • cholecalciferol (VITAMIN D3) 1000 UNITS tablet Take 1,000 Units by mouth Daily.     • fluticasone (FLONASE) 50 MCG/ACT nasal spray 2 sprays into the nostril(s) as directed by provider Daily. 1 bottle 2   • gabapentin (NEURONTIN) 600 MG tablet TAKE 1/2 TO 1 TABLET BY MOUTH THREE (3) TIMES DAILY AS NEEDED 270 tablet 1   • HYDROcodone-acetaminophen (NORCO) 7.5-325 MG per tablet Take 1 tablet by mouth Every 6 (Six) Hours As Needed for Mild Pain  or Moderate Pain . 60 tablet 0   • lidocaine-hydrocortisone 3-0.5 % cream rectal cream Insert 1 application into the rectum 2 (Two) Times a Day As Needed (HEMORRHOID). 1 tube 11   • loratadine (CLARITIN) 10 MG tablet Take 1 tablet by mouth Daily. 30 tablet 11   • Magnesium 100 MG capsule Take 400 mg by mouth Daily.     • pramipexole (MIRAPEX) 0.5 MG tablet TAKE 2 TABLETS BY MOUTH EVERY NIGHT. 180 tablet 2   • promethazine (PHENERGAN) 25 MG tablet TAKE 1  TABLET BY MOUTH EVERY 6 (SIX) HOURS AS NEEDED FOR NAUSEA OR VOMITING - - MAY CAUSE DROWSINESS 30 tablet 0   • sertraline (Zoloft) 100 MG tablet Take 1 tablet by mouth Daily. 90 tablet 3   • triamcinolone (KENALOG) 0.1 % cream Apply  topically to the appropriate area as directed 3 (Three) Times a Day. 454 g 0   • trimethoprim-polymyxin b (POLYTRIM) 26735-5.1 UNIT/ML-% ophthalmic solution Administer 1 drop to the right eye Every 4 (Four) Hours. 10 mL 0   • vitamin E (vitamin E) 400 UNIT capsule Take 1 capsule by mouth 2 (Two) Times a Day. 60 capsule 0   • meclizine (ANTIVERT) 12.5 MG tablet Take 1 tablet by mouth 3 (Three) Times a Day As Needed for Dizziness. 90 tablet 0   • mupirocin (Bactroban) 2 % ointment Apply  topically to the appropriate area as directed 2 (Two) Times a Day. 15 g 0     No current facility-administered medications for this visit.        The following portions of the patient's history were reviewed and updated as appropriate: allergies, current medications, past family history, past medical history, past social history, past surgical history and problem list.        Assessment/Plan   Diagnoses and all orders for this visit:    1. Right foot pain (Primary)  -     XR Foot 3+ View Right  -     Ambulatory Referral to Podiatry    2. Nerve pain  -     HYDROcodone-acetaminophen (NORCO) 7.5-325 MG per tablet; Take 1 tablet by mouth Every 6 (Six) Hours As Needed for Mild Pain  or Moderate Pain .  Dispense: 60 tablet; Refill: 0    3. Chronic right-sided low back pain with right-sided sciatica  -     HYDROcodone-acetaminophen (NORCO) 7.5-325 MG per tablet; Take 1 tablet by mouth Every 6 (Six) Hours As Needed for Mild Pain  or Moderate Pain .  Dispense: 60 tablet; Refill: 0    4. Chronic pain of right knee  -     HYDROcodone-acetaminophen (NORCO) 7.5-325 MG per tablet; Take 1 tablet by mouth Every 6 (Six) Hours As Needed for Mild Pain  or Moderate Pain .  Dispense: 60 tablet; Refill: 0        Refilled her  pain medicine for her     Referral    Handicap permit                No follow-ups on file.                  This document has been electronically signed by Sulaiman Rboert MD on September 16, 2021 17:08 CDT

## 2021-09-29 ENCOUNTER — OFFICE VISIT (OUTPATIENT)
Dept: PODIATRY | Facility: CLINIC | Age: 81
End: 2021-09-29

## 2021-09-29 VITALS
SYSTOLIC BLOOD PRESSURE: 147 MMHG | BODY MASS INDEX: 28.99 KG/M2 | HEART RATE: 86 BPM | HEIGHT: 66 IN | DIASTOLIC BLOOD PRESSURE: 63 MMHG | OXYGEN SATURATION: 96 % | WEIGHT: 180.4 LBS

## 2021-09-29 DIAGNOSIS — M79.672 PAIN IN BOTH FEET: Primary | ICD-10-CM

## 2021-09-29 DIAGNOSIS — R09.89 DECREASED PEDAL PULSES: ICD-10-CM

## 2021-09-29 DIAGNOSIS — M79.671 PAIN IN BOTH FEET: Primary | ICD-10-CM

## 2021-09-29 PROCEDURE — 99203 OFFICE O/P NEW LOW 30 MIN: CPT | Performed by: PODIATRIST

## 2021-09-29 RX ORDER — HYDROCODONE BITARTRATE AND ACETAMINOPHEN 7.5; 325 MG/1; MG/1
TABLET ORAL EVERY 6 HOURS
COMMUNITY
End: 2021-11-01 | Stop reason: SDUPTHER

## 2021-09-29 NOTE — PROGRESS NOTES
Sara Solis  1940  80 y.o. female     Right foot pain       9/29/2021  Chief Complaint   Patient presents with   • Right Foot - Pain, Cyst       History of Present Illness    Pleasant 80-year-old female presents to clinic with chief complaint of foot pain.  Pain is primarily located lies to her right foot and is worse when lying flat in bed.  There are no associated injuries.      Past Medical History:   Diagnosis Date   • Actinic keratosis    • Allergic rhinitis    • Altered bowel function    • Anxiety state    • Aptyalism    • Attention deficit hyperactivity disorder    • Bunion    • Callus    • Cough    • Degenerative joint disease involving multiple joints    • Depressive disorder    • Diarrhea    • Disorder of skin    • Fissure in skin    • Foot pain    • Generalized anxiety disorder    • Hammer toe    • Hepatitis    • Hepatitis C    • Herpes labialis    • History of bone scan 10/01/2008    DXA BONE DENSITY AXIAL 91048 (2) - Osteopenia of the lumbar spine, which has improved compared to the prior study. Normal bone mineral density of the hips, which has improved compared to the prior study   • History of colonoscopy 04/05/2011    Colon endoscopy 62617 (2) - Diverticulosis found in sigmoid colon. Moderate fixation of sigmoid colon. Internal hemorrhoids found in anus.    • History of esophagogastroduodenoscopy 04/05/2011    EGD w/ tube 31388 (1) - Normal hypopharynx, GE junction, duodenum, symmetrical & patent pylorus. Normal esophagus. Dilatation performed. Chronic gastritis found in antrum. Biopsy taken.   • History of mammogram 10/01/2008    Mammogram, both breasts (1) - Negative mammogram. Recommend follow-up in 12 months;     • Incontinence of feces    • Increased frequency of urination    • Insomnia    • Irritable bowel syndrome    • Knee pain    • Lipoma of shoulder    • Low back pain    • Lumbosacral radiculopathy    • Nausea    • Nervous system abnormality     Disorder of the peripheral  nervous system     • Osteopenia    • Pain in limb    • Pain in lower limb    • Plantar fasciitis    • Pure hypercholesterolemia    • Restless legs    • Skin lesion     nose   • Spasm    • Spasm of back muscles    • Squamous cell carcinoma of skin    • Stress fracture    • Tear film insufficiency    • Trochanteric bursitis of right hip    • Upper respiratory infection    • Urinary tract infectious disease    • Vertigo          Past Surgical History:   Procedure Laterality Date   • BREAST BIOPSY  12/04/1995    BIOPSY OF BREAST OPEN 16533 (2) - Right,Exscision of mass.Fibroadenoma. Fibrocystic disease, proliferative   • CATARACT EXTRACTION WITH INTRAOCULAR LENS IMPLANT Right 03/07/2006    Remove cataract, insert lens (1) - right   • DILATATION AND CURETTAGE  10/02/1986    D&C (1) - Fractional D&C laparoscopic tubal sterilization. Plus uterine fibroids, approximately 10-12 weeks in size   • FOOT SURGERY  03/20/1990    Foot/toes surgery procedure (1) - Left,Excisional biopsy. Tumor, supposed fibroma, 5th toe   • HIP PINNING Right 12/21/2017    Procedure: HIP PINNING                 (C-ARM#3);  Surgeon: Basil Ulrich MD;  Location: [facility];  Service:    • INJECTION OF MEDICATION  06/08/2015    Kenalog (2) - SERENA Robert   • LIVER BIOPSY  04/26/2000    Needle biopsy of liver (1) - percutaneous liver biopsy;     • OTHER SURGICAL HISTORY  10/17/2002    Chest procedure (2) - Intralesional injection of keloid of chest    • OTHER SURGICAL HISTORY  10/25/2001    Remove axillary lymph nodes (1) - Biopsy, Left axillary adenopathy. 4 lymph nodes with reactive hyperplasia No tumor seen   • OTHER SURGICAL HISTORY  06/17/2013    Biopsy, Each Additional Lesion 59058 (1) - SERENA Robert   • OTHER SURGICAL HISTORY  07/14/2014    Destruction of Premalignant Lesion (1st) 31507 (2)  - SERENA Robert   • OTHER SURGICAL HISTORY  05/10/2016    Drain/Inject Major Joint 50449 (4) - SERENA Robert   • SKIN BIOPSY  07/28/2014    Biopsy of Skin 45368 (2)  -  SERENA Robert   • TOTAL ABDOMINAL HYSTERECTOMY WITH SALPINGO OOPHORECTOMY  09/25/1990    incidental appendectomy.Uterine fibroids         Family History   Problem Relation Age of Onset   • Melanoma Other    • Stroke Other    • Osteoporosis Mother    • Cancer Father    • Psoriasis Sister    • Severe sprains Sister    • Diabetes Sister    • Heart disease Maternal Uncle    • Heart disease Maternal Grandmother        No Known Allergies    Social History     Socioeconomic History   • Marital status:      Spouse name: Not on file   • Number of children: Not on file   • Years of education: Not on file   • Highest education level: Not on file   Tobacco Use   • Smoking status: Never Smoker   • Smokeless tobacco: Never Used   Vaping Use   • Vaping Use: Never used   Substance and Sexual Activity   • Alcohol use: No   • Drug use: No   • Sexual activity: Never         Current Outpatient Medications   Medication Sig Dispense Refill   • cholecalciferol (VITAMIN D3) 1000 UNITS tablet Take 1,000 Units by mouth Daily.     • fluticasone (FLONASE) 50 MCG/ACT nasal spray 2 sprays into the nostril(s) as directed by provider Daily. 1 bottle 2   • gabapentin (NEURONTIN) 600 MG tablet TAKE 1/2 TO 1 TABLET BY MOUTH THREE (3) TIMES DAILY AS NEEDED 270 tablet 1   • HYDROcodone-acetaminophen (NORCO) 7.5-325 MG per tablet Take 1 tablet by mouth Every 6 (Six) Hours As Needed for Mild Pain  or Moderate Pain . 60 tablet 0   • HYDROcodone-acetaminophen (NORCO) 7.5-325 MG per tablet Every 6 (Six) Hours.     • lidocaine-hydrocortisone 3-0.5 % cream rectal cream Insert 1 application into the rectum 2 (Two) Times a Day As Needed (HEMORRHOID). 1 tube 11   • loratadine (CLARITIN) 10 MG tablet Take 1 tablet by mouth Daily. 30 tablet 11   • Magnesium 100 MG capsule Take 400 mg by mouth Daily.     • mupirocin (Bactroban) 2 % ointment Apply  topically to the appropriate area as directed 2 (Two) Times a Day. 15 g 0   • pramipexole (MIRAPEX) 0.5 MG tablet TAKE  "2 TABLETS BY MOUTH EVERY NIGHT. 180 tablet 2   • promethazine (PHENERGAN) 25 MG tablet TAKE 1 TABLET BY MOUTH EVERY 6 (SIX) HOURS AS NEEDED FOR NAUSEA OR VOMITING - - MAY CAUSE DROWSINESS 30 tablet 0   • sertraline (Zoloft) 100 MG tablet Take 1 tablet by mouth Daily. 90 tablet 3   • triamcinolone (KENALOG) 0.1 % cream Apply  topically to the appropriate area as directed 3 (Three) Times a Day. 454 g 0   • trimethoprim-polymyxin b (POLYTRIM) 16561-1.1 UNIT/ML-% ophthalmic solution Administer 1 drop to the right eye Every 4 (Four) Hours. 10 mL 0   • vitamin E (vitamin E) 400 UNIT capsule Take 1 capsule by mouth 2 (Two) Times a Day. 60 capsule 0   • meclizine (ANTIVERT) 12.5 MG tablet Take 1 tablet by mouth 3 (Three) Times a Day As Needed for Dizziness. 90 tablet 0     No current facility-administered medications for this visit.         OBJECTIVE    /63   Pulse 86   Ht 166.4 cm (65.51\")   Wt 81.8 kg (180 lb 6.4 oz)   SpO2 96%   BMI 29.55 kg/m²       Review of Systems   Constitutional: Negative.    Respiratory: Negative.    Cardiovascular: Negative.    Gastrointestinal: Negative.    Genitourinary: Negative.    Musculoskeletal: Positive for back pain.        Right foot pain   Skin: Negative.    Allergic/Immunologic: Negative.    Neurological: Positive for dizziness and headaches.   Psychiatric/Behavioral: Negative.          Right Lower Extremity:     Cardiovascular:    PT pulse palpable, faintly palpable DP pulse  CFT brisk  to all digits   Musculoskeletal:  Muscle strength is 5/5 for all muscle groups tested   ROM of the 1st MTP is full without pain or crepitus  ROM of the MTJ is full with pain    No pain on palpation   Dermatological:   Skin is warm, dry and intact    Webspaces 1-4 are clean, dry and intact.   No subcutaneous nodules or masses noted    No open wounds noted   Neurological:   Protective sensation intact    Sensation intact to light touch        Procedures        ASSESSMENT AND PLAN    Diagnoses " and all orders for this visit:    1. Pain in both feet (Primary)    2. Decreased pedal pulses  -     Doppler Ankle Brachial Index Single Level CAR; Future      - Comprehensive foot and ankle exam performed.   -Radiographs reviewed.  No acute osseous or articular pathology.  -Ordered noninvasive vascular testing to rule out vascular component to patient's resting pain.  - All questions were answered to the patients satisfaction.  - RTC after vascular testing              This document has been electronically signed by Vincenzo Holly DPM on September 29, 2021 11:31 CDT     9/29/2021  11:31 CDT

## 2021-10-05 ENCOUNTER — TELEPHONE (OUTPATIENT)
Dept: FAMILY MEDICINE CLINIC | Facility: CLINIC | Age: 81
End: 2021-10-05

## 2021-10-05 RX ORDER — MECLIZINE HCL 12.5 MG/1
12.5 TABLET ORAL 3 TIMES DAILY PRN
Qty: 90 TABLET | Refills: 11 | Status: SHIPPED | OUTPATIENT
Start: 2021-10-05 | End: 2021-12-11 | Stop reason: SDUPTHER

## 2021-10-05 NOTE — TELEPHONE ENCOUNTER
Incoming Refill Request      Medication requested (name and dose): antivert 12.5 mg    Pharm[facility] sent[facility]    Additional details provided by patient:     Best call back number:     Does the patient have less than a 3 day supply:  [] Yes  [] No    Pascale Pereira Rep  10/05/21, 15:34 CDT

## 2021-10-21 ENCOUNTER — TELEPHONE (OUTPATIENT)
Dept: FAMILY MEDICINE CLINIC | Facility: CLINIC | Age: 81
End: 2021-10-21

## 2021-10-21 NOTE — TELEPHONE ENCOUNTER
MS. LAFFOON CALLED AND SAID THAT YOU HAD GIVEN HER MECLIZINE FOR DIZZINESS, BUT THAT IT IS NOT HELPING.  SHE THINKS IT MAY BE INNER EAR.  IS THEIR ANY THING ELSE SHE CAN TAKE?

## 2021-11-01 ENCOUNTER — OFFICE VISIT (OUTPATIENT)
Dept: OTOLARYNGOLOGY | Facility: CLINIC | Age: 81
End: 2021-11-01

## 2021-11-01 VITALS
WEIGHT: 180.2 LBS | DIASTOLIC BLOOD PRESSURE: 80 MMHG | HEIGHT: 65 IN | BODY MASS INDEX: 30.02 KG/M2 | SYSTOLIC BLOOD PRESSURE: 155 MMHG

## 2021-11-01 DIAGNOSIS — H60.63 CHRONIC NON-INFECTIVE OTITIS EXTERNA OF BOTH EARS, UNSPECIFIED TYPE: ICD-10-CM

## 2021-11-01 DIAGNOSIS — R26.89 IMBALANCE: Primary | ICD-10-CM

## 2021-11-01 DIAGNOSIS — J30.0 VASOMOTOR RHINITIS: ICD-10-CM

## 2021-11-01 PROCEDURE — 99214 OFFICE O/P EST MOD 30 MIN: CPT | Performed by: OTOLARYNGOLOGY

## 2021-11-01 RX ORDER — IPRATROPIUM BROMIDE 42 UG/1
2 SPRAY, METERED NASAL 3 TIMES DAILY
Qty: 15 ML | Refills: 11 | Status: SHIPPED | OUTPATIENT
Start: 2021-11-01 | End: 2021-12-11 | Stop reason: SDUPTHER

## 2021-11-04 NOTE — PROGRESS NOTES
Subjective   Sara Solis is a 80 y.o. female.       History of Present Illness   Patient is here stating she has imbalance when she walks.  Says her ears feel full.  No symptoms when sitting still.  No otorrhea.  Nothing in particular brought this on.  Does have a history of neuropathy.  Additionally has chronic clear watery rhinorrhea.  She takes an oral antihistamine that is not particularly effective for this.  Also has Flonase but only uses it as needed.      The following portions of the patient's history were reviewed and updated as appropriate: allergies, current medications, past family history, past medical history, past social history, past surgical history and problem list.     reports that she has never smoked. She has never used smokeless tobacco. She reports that she does not drink alcohol and does not use drugs.   Patient is not a tobacco user and has not been counseled for use of tobacco products      Review of Systems        Objective   Physical Exam  Ears: External ears no deformity.  Both ear canals show some uninfected squamous debris that is cleaned under the microscope using instrumentation.  Beyond this tympanic membranes are intact with no infection or effusion  Nares: Pale mucosa no discharge or purulence  Oral cavity: No masses or lesions  Pharynx: No erythema or exudate  Neck no adenopathy      Assessment/Plan   Diagnoses and all orders for this visit:    1. Imbalance (Primary)    2. Chronic non-infective otitis externa of both ears, unspecified type    3. Vasomotor rhinitis    Other orders  -     ipratropium (ATROVENT) 0.06 % nasal spray; 2 sprays into the nostril(s) as directed by provider 3 (Three) Times a Day.  Dispense: 15 mL; Refill: 11      Plan: Ears cleaned as described above.  Explained that her imbalance is likely related to her neuropathy not due to any acute otologic pathology.  Should discontinue meclizine.  Use a walking stick.  Prescribed Atrovent for the vasomotor  rhinitis.  Return in 6 months.  Call sooner for problems.

## 2021-11-24 ENCOUNTER — HOME HEALTH ADMISSION (OUTPATIENT)
Dept: HOME HEALTH SERVICES | Facility: HOME HEALTHCARE | Age: 81
End: 2021-11-24

## 2021-12-09 ENCOUNTER — TRANSCRIBE ORDERS (OUTPATIENT)
Dept: PHYSICAL THERAPY | Facility: HOSPITAL | Age: 81
End: 2021-12-09

## 2021-12-09 DIAGNOSIS — Z96.651 STATUS POST RIGHT KNEE REPLACEMENT: Primary | ICD-10-CM

## 2021-12-11 ENCOUNTER — DOCUMENTATION (OUTPATIENT)
Dept: FAMILY MEDICINE CLINIC | Facility: CLINIC | Age: 81
End: 2021-12-11

## 2021-12-11 DIAGNOSIS — G89.29 CHRONIC PAIN OF RIGHT KNEE: ICD-10-CM

## 2021-12-11 DIAGNOSIS — G57.93 NEUROPATHY OF BOTH FEET: ICD-10-CM

## 2021-12-11 DIAGNOSIS — M79.2 NERVE PAIN: ICD-10-CM

## 2021-12-11 DIAGNOSIS — M54.41 CHRONIC RIGHT-SIDED LOW BACK PAIN WITH RIGHT-SIDED SCIATICA: ICD-10-CM

## 2021-12-11 DIAGNOSIS — G89.29 CHRONIC RIGHT-SIDED LOW BACK PAIN WITH RIGHT-SIDED SCIATICA: ICD-10-CM

## 2021-12-11 DIAGNOSIS — M25.561 CHRONIC PAIN OF RIGHT KNEE: ICD-10-CM

## 2021-12-11 RX ORDER — SERTRALINE HYDROCHLORIDE 100 MG/1
100 TABLET, FILM COATED ORAL DAILY
Qty: 90 TABLET | Refills: 3 | Status: SHIPPED | OUTPATIENT
Start: 2021-12-11 | End: 2022-12-22

## 2021-12-11 RX ORDER — IPRATROPIUM BROMIDE 42 UG/1
2 SPRAY, METERED NASAL 3 TIMES DAILY
Qty: 15 ML | Refills: 11 | Status: SHIPPED | OUTPATIENT
Start: 2021-12-11 | End: 2023-03-22

## 2021-12-11 RX ORDER — MECLIZINE HCL 12.5 MG/1
12.5 TABLET ORAL 3 TIMES DAILY PRN
Qty: 90 TABLET | Refills: 11 | Status: SHIPPED | OUTPATIENT
Start: 2021-12-11 | End: 2023-03-22

## 2021-12-11 RX ORDER — GABAPENTIN 600 MG/1
TABLET ORAL
Qty: 270 TABLET | Refills: 0 | Status: SHIPPED | OUTPATIENT
Start: 2021-12-11 | End: 2022-01-28 | Stop reason: SDUPTHER

## 2021-12-11 RX ORDER — PRAMIPEXOLE DIHYDROCHLORIDE 0.5 MG/1
1 TABLET ORAL NIGHTLY
Qty: 180 TABLET | Refills: 2 | Status: SHIPPED | OUTPATIENT
Start: 2021-12-11 | End: 2022-08-08

## 2021-12-11 RX ORDER — HYDROCODONE BITARTRATE AND ACETAMINOPHEN 7.5; 325 MG/1; MG/1
1 TABLET ORAL EVERY 6 HOURS PRN
Qty: 60 TABLET | Refills: 0 | Status: SHIPPED | OUTPATIENT
Start: 2021-12-11 | End: 2022-01-28 | Stop reason: SDUPTHER

## 2021-12-11 RX ORDER — MELATONIN
1000 DAILY
Qty: 90 TABLET | Refills: 3 | Status: SHIPPED | OUTPATIENT
Start: 2021-12-11 | End: 2022-07-06

## 2022-01-10 ENCOUNTER — TELEPHONE (OUTPATIENT)
Dept: FAMILY MEDICINE CLINIC | Facility: CLINIC | Age: 82
End: 2022-01-10

## 2022-01-10 NOTE — TELEPHONE ENCOUNTER
Mrs. Laffoon called and said that she has an appt. With Dr. Beyer and that they told her she needs to get off the Hydrocodone, please call her at [phone]

## 2022-01-11 ENCOUNTER — OFFICE VISIT (OUTPATIENT)
Dept: FAMILY MEDICINE CLINIC | Facility: CLINIC | Age: 82
End: 2022-01-11

## 2022-01-11 DIAGNOSIS — R52 PAIN: ICD-10-CM

## 2022-01-11 DIAGNOSIS — G57.93 NEUROPATHY OF BOTH FEET: ICD-10-CM

## 2022-01-11 DIAGNOSIS — G47.00 INSOMNIA, UNSPECIFIED TYPE: ICD-10-CM

## 2022-01-11 DIAGNOSIS — M62.838 MUSCLE SPASM: Primary | ICD-10-CM

## 2022-01-11 PROCEDURE — 99213 OFFICE O/P EST LOW 20 MIN: CPT | Performed by: FAMILY MEDICINE

## 2022-01-11 RX ORDER — METHOCARBAMOL 500 MG/1
500 TABLET, FILM COATED ORAL NIGHTLY PRN
Qty: 30 TABLET | Refills: 11 | Status: SHIPPED | OUTPATIENT
Start: 2022-01-11 | End: 2023-03-22

## 2022-01-11 RX ORDER — NABUMETONE 500 MG/1
500 TABLET, FILM COATED ORAL NIGHTLY PRN
Qty: 30 TABLET | Refills: 0 | Status: SHIPPED | OUTPATIENT
Start: 2022-01-11 | End: 2022-01-11

## 2022-01-11 RX ORDER — NAPROXEN 375 MG/1
375 TABLET ORAL 2 TIMES DAILY PRN
Qty: 60 TABLET | Refills: 11 | Status: SHIPPED | OUTPATIENT
Start: 2022-01-11 | End: 2023-01-10
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
## 2022-01-12 ENCOUNTER — TELEPHONE (OUTPATIENT)
Dept: FAMILY MEDICINE CLINIC | Facility: CLINIC | Age: 82
End: 2022-01-12
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
## 2022-01-28 ENCOUNTER — TELEPHONE (OUTPATIENT)
Dept: FAMILY MEDICINE CLINIC | Facility: CLINIC | Age: 82
End: 2022-01-28

## 2022-01-28 DIAGNOSIS — M54.41 CHRONIC RIGHT-SIDED LOW BACK PAIN WITH RIGHT-SIDED SCIATICA: ICD-10-CM

## 2022-01-28 DIAGNOSIS — G89.29 CHRONIC PAIN OF RIGHT KNEE: ICD-10-CM

## 2022-01-28 DIAGNOSIS — G89.29 CHRONIC RIGHT-SIDED LOW BACK PAIN WITH RIGHT-SIDED SCIATICA: ICD-10-CM

## 2022-01-28 DIAGNOSIS — G57.93 NEUROPATHY OF BOTH FEET: ICD-10-CM

## 2022-01-28 DIAGNOSIS — M25.561 CHRONIC PAIN OF RIGHT KNEE: ICD-10-CM

## 2022-01-28 DIAGNOSIS — M79.2 NERVE PAIN: ICD-10-CM

## 2022-01-28 RX ORDER — GABAPENTIN 600 MG/1
TABLET ORAL
Qty: 270 TABLET | Refills: 0 | Status: SHIPPED | OUTPATIENT
Start: 2022-01-28 | End: 2022-12-22

## 2022-01-28 RX ORDER — HYDROCODONE BITARTRATE AND ACETAMINOPHEN 7.5; 325 MG/1; MG/1
1 TABLET ORAL EVERY 6 HOURS PRN
Qty: 60 TABLET | Refills: 0 | Status: SHIPPED | OUTPATIENT
Start: 2022-01-28 | End: 2023-03-22 | Stop reason: SDUPTHER

## 2022-01-28 NOTE — TELEPHONE ENCOUNTER
DR. PISANO GAVE PATIENT ROBAXIN FOR MUSCLE SPASMS TO TAKE AT NIGHT.  SHE IS HAVING SPASMS DURING THE DAY, TOO AND WANTS TO KNOW IF THERE IS SOMETHING NOT AS STRONG THAT SHE COULD TAKE DURING THE DAY.  [facility]  [phone]

## 2022-02-10 ENCOUNTER — TELEPHONE (OUTPATIENT)
Dept: FAMILY MEDICINE CLINIC | Facility: CLINIC | Age: 82
End: 2022-02-10

## 2022-02-10 NOTE — TELEPHONE ENCOUNTER
Incoming Refill Request      Medication requested (name and dose): Methocarbamol (Robaxin) 500 MG    Pharmacy where request should be sent: [facility] in [place]    Additional details provided by patient: Mrs. Solis said that the pharmacy told her it would need to be [place]    Best call back number: [phone]    Does the patient have less than a 3 day supply:  [x] Yes  [] No    Pascale Hebert Rep  02/10/22, 08:46 CST

## 2022-07-06 ENCOUNTER — LAB (OUTPATIENT)
Dept: LAB | Facility: HOSPITAL | Age: 82
End: 2022-07-06

## 2022-07-06 ENCOUNTER — OFFICE VISIT (OUTPATIENT)
Dept: FAMILY MEDICINE CLINIC | Facility: CLINIC | Age: 82
End: 2022-07-06

## 2022-07-06 VITALS
SYSTOLIC BLOOD PRESSURE: 145 MMHG | TEMPERATURE: 97.8 F | HEIGHT: 65 IN | BODY MASS INDEX: 29.66 KG/M2 | DIASTOLIC BLOOD PRESSURE: 75 MMHG | HEART RATE: 91 BPM | OXYGEN SATURATION: 96 % | WEIGHT: 178 LBS

## 2022-07-06 DIAGNOSIS — R42 VERTIGO: ICD-10-CM

## 2022-07-06 DIAGNOSIS — G57.93 NEUROPATHY OF BOTH FEET: ICD-10-CM

## 2022-07-06 DIAGNOSIS — R53.83 OTHER FATIGUE: Primary | ICD-10-CM

## 2022-07-06 LAB
EXPIRATION DATE: NORMAL
FLUAV AG UPPER RESP QL IA.RAPID: NOT DETECTED
FLUBV AG UPPER RESP QL IA.RAPID: NOT DETECTED
INTERNAL CONTROL: NORMAL
Lab: NORMAL
SARS-COV-2 AG UPPER RESP QL IA.RAPID: NOT DETECTED

## 2022-07-06 PROCEDURE — 82607 VITAMIN B-12: CPT | Performed by: FAMILY MEDICINE

## 2022-07-06 PROCEDURE — 84207 ASSAY OF VITAMIN B-6: CPT | Performed by: FAMILY MEDICINE

## 2022-07-06 PROCEDURE — 87428 SARSCOV & INF VIR A&B AG IA: CPT | Performed by: FAMILY MEDICINE

## 2022-07-06 PROCEDURE — 85027 COMPLETE CBC AUTOMATED: CPT | Performed by: FAMILY MEDICINE

## 2022-07-06 PROCEDURE — 84425 ASSAY OF VITAMIN B-1: CPT | Performed by: FAMILY MEDICINE

## 2022-07-06 PROCEDURE — 80053 COMPREHEN METABOLIC PANEL: CPT | Performed by: FAMILY MEDICINE

## 2022-07-06 PROCEDURE — 99214 OFFICE O/P EST MOD 30 MIN: CPT | Performed by: FAMILY MEDICINE

## 2022-07-06 PROCEDURE — 84443 ASSAY THYROID STIM HORMONE: CPT | Performed by: FAMILY MEDICINE

## 2022-07-06 PROCEDURE — 82746 ASSAY OF FOLIC ACID SERUM: CPT | Performed by: FAMILY MEDICINE

## 2022-07-06 RX ORDER — MAGNESIUM GLUCONATE 30 MG(550)
500 TABLET ORAL
COMMUNITY

## 2022-07-06 RX ORDER — LOPERAMIDE HYDROCHLORIDE 2 MG/1
CAPSULE ORAL
Qty: 30 CAPSULE | Refills: 0 | Status: SHIPPED | OUTPATIENT
Start: 2022-07-06 | End: 2022-09-28

## 2022-07-06 RX ORDER — FERROUS SULFATE TAB EC 324 MG (65 MG FE EQUIVALENT) 324 (65 FE) MG
324 TABLET DELAYED RESPONSE ORAL
COMMUNITY

## 2022-07-06 RX ORDER — DULOXETIN HYDROCHLORIDE 20 MG/1
20 CAPSULE, DELAYED RELEASE ORAL
Qty: 30 CAPSULE | Refills: 1 | Status: SHIPPED | OUTPATIENT
Start: 2022-07-06

## 2022-07-06 RX ORDER — AMOXICILLIN 500 MG/1
CAPSULE ORAL
COMMUNITY
Start: 2022-06-28 | End: 2023-03-22

## 2022-07-06 RX ORDER — PRAMIPEXOLE DIHYDROCHLORIDE 0.5 MG/1
0.5 TABLET ORAL
COMMUNITY
End: 2022-07-06 | Stop reason: SDUPTHER

## 2022-07-06 RX ORDER — ASCORBIC ACID 500 MG
500 TABLET ORAL
COMMUNITY

## 2022-07-06 NOTE — PROGRESS NOTES
" Subjective   Sara Arlet Solis is a 81 y.o. female.     Chief Complaint   Patient presents with   • Dizziness             History of Present Illness     On neurontin 1200mg at night for neuropathy in her feet.  Also has restless legs.   Also  Has had low energy  Also  Has had dizziness, sensation room spinning.  Has seen neuro and ent and pt.  No better.  Meclizine no help.  She denies any roaring sound in her ears.   Also  Has had some diarrhea.  Did take some amoxicillin for her pulled tooth.     Review of Systems   Constitutional: Positive for fatigue. Negative for chills and fever.   HENT: Negative for congestion, ear discharge, ear pain, facial swelling, hearing loss, postnasal drip, rhinorrhea, sinus pressure, sore throat, trouble swallowing and voice change.    Eyes: Negative for discharge, redness and visual disturbance.   Respiratory: Negative for cough, chest tightness, shortness of breath and wheezing.    Cardiovascular: Negative for chest pain and palpitations.   Gastrointestinal: Positive for diarrhea. Negative for abdominal pain, blood in stool, constipation, nausea and vomiting.   Endocrine: Negative for polydipsia and polyuria.   Genitourinary: Negative for dysuria, flank pain, hematuria and urgency.   Musculoskeletal: Negative for arthralgias, back pain, joint swelling and myalgias.   Skin: Negative for rash.   Neurological: Positive for dizziness. Negative for weakness, numbness and headaches.   Hematological: Negative for adenopathy.   Psychiatric/Behavioral: Negative for confusion and sleep disturbance. The patient is not nervous/anxious.            /75 (BP Location: Left arm, Patient Position: Sitting, Cuff Size: Adult)   Pulse 91   Temp 97.8 °F (36.6 °C) (Infrared)   Ht 165.1 cm (65\")   Wt 80.7 kg (178 lb)   SpO2 96%   BMI 29.62 kg/m²       Objective     Physical Exam  Vitals and nursing note reviewed.   Constitutional:       Appearance: Normal appearance. She is well-developed. "   HENT:      Head: Normocephalic and atraumatic.      Right Ear: Tympanic membrane, ear canal and external ear normal.      Left Ear: Tympanic membrane, ear canal and external ear normal.      Nose: Nose normal. No rhinorrhea.   Eyes:      General: No scleral icterus.     Extraocular Movements: Extraocular movements intact.      Conjunctiva/sclera: Conjunctivae normal.      Pupils: Pupils are equal, round, and reactive to light.   Cardiovascular:      Rate and Rhythm: Normal rate and regular rhythm.      Heart sounds: Normal heart sounds.     No friction rub. No gallop.   Pulmonary:      Effort: Pulmonary effort is normal.      Breath sounds: Normal breath sounds.   Abdominal:      General: Bowel sounds are normal. There is no distension.      Palpations: Abdomen is soft.      Tenderness: There is no abdominal tenderness.   Musculoskeletal:         General: No deformity. Normal range of motion.      Cervical back: Normal range of motion and neck supple.   Skin:     General: Skin is warm and dry.      Findings: No erythema or rash.   Neurological:      Mental Status: She is alert and oriented to person, place, and time.      Cranial Nerves: No cranial nerve deficit.   Psychiatric:         Behavior: Behavior normal.         Thought Content: Thought content normal.         Judgment: Judgment normal.             PAST MEDICAL HISTORY     Past Medical History:   Diagnosis Date   • Actinic keratosis    • Allergic rhinitis    • Altered bowel function    • Anxiety state    • Aptyalism    • Attention deficit hyperactivity disorder    • Bunion    • Callus    • Cough    • Degenerative joint disease involving multiple joints    • Depressive disorder    • Diarrhea    • Disorder of skin    • Fissure in skin    • Foot pain    • Generalized anxiety disorder    • Hammer toe    • Hepatitis    • Hepatitis C    • Herpes labialis    • History of bone scan 10/01/2008    DXA BONE DENSITY AXIAL 36839 (2) - Osteopenia of the lumbar spine,  which has improved compared to the prior study. Normal bone mineral density of the hips, which has improved compared to the prior study   • History of colonoscopy 04/05/2011    Colon endoscopy 17576 (2) - Diverticulosis found in sigmoid colon. Moderate fixation of sigmoid colon. Internal hemorrhoids found in anus.    • History of esophagogastroduodenoscopy 04/05/2011    EGD w/ tube 94249 (1) - Normal hypopharynx, GE junction, duodenum, symmetrical & patent pylorus. Normal esophagus. Dilatation performed. Chronic gastritis found in antrum. Biopsy taken.   • History of mammogram 10/01/2008    Mammogram, both breasts (1) - Negative mammogram. Recommend follow-up in 12 months;     • Incontinence of feces    • Increased frequency of urination    • Insomnia    • Irritable bowel syndrome    • Knee pain    • Lipoma of shoulder    • Low back pain    • Lumbosacral radiculopathy    • Nausea    • Nervous system abnormality     Disorder of the peripheral nervous system     • Osteopenia    • Pain in limb    • Pain in lower limb    • Plantar fasciitis    • Pure hypercholesterolemia    • Restless legs    • Skin lesion     nose   • Spasm    • Spasm of back muscles    • Squamous cell carcinoma of skin    • Stress fracture    • Tear film insufficiency    • Trochanteric bursitis of right hip    • Upper respiratory infection    • Urinary tract infectious disease    • Vertigo       PAST SURGICAL HISTORY     Past Surgical History:   Procedure Laterality Date   • BREAST BIOPSY  12/04/1995    BIOPSY OF BREAST OPEN 19101 (2) - Right,Exscision of mass.Fibroadenoma. Fibrocystic disease, proliferative   • CATARACT EXTRACTION WITH INTRAOCULAR LENS IMPLANT Right 03/07/2006    Remove cataract, insert lens (1) - right   • DILATATION AND CURETTAGE  10/02/1986    D&C (1) - Fractional D&C laparoscopic tubal sterilization. Plus uterine fibroids, approximately 10-12 weeks in size   • FOOT SURGERY  03/20/1990    Foot/toes surgery procedure (1) -  Left,Excisional biopsy. Tumor, supposed fibroma, 5th toe   • HIP PINNING Right 12/21/2017    Procedure: HIP PINNING                 (C-ARM#3);  Surgeon: Basil Ulrich MD;  Location: [facility];  Service:    • INJECTION OF MEDICATION  06/08/2015    Kenalog (2) - SERENA Robert   • LIVER BIOPSY  04/26/2000    Needle biopsy of liver (1) - percutaneous liver biopsy;     • OTHER SURGICAL HISTORY  10/17/2002    Chest procedure (2) - Intralesional injection of keloid of chest    • OTHER SURGICAL HISTORY  10/25/2001    Remove axillary lymph nodes (1) - Biopsy, Left axillary adenopathy. 4 lymph nodes with reactive hyperplasia No tumor seen   • OTHER SURGICAL HISTORY  06/17/2013    Biopsy, Each Additional Lesion 34522 (1) - SERENA Robert   • OTHER SURGICAL HISTORY  07/14/2014    Destruction of Premalignant Lesion (1st) 67041 (2)  - SERENA Robert   • OTHER SURGICAL HISTORY  05/10/2016    Drain/Inject Major Joint 44163 (4) - SERENA Robert   • SKIN BIOPSY  07/28/2014    Biopsy of Skin 92783 (2)  - SERENA Robert   • TOTAL ABDOMINAL HYSTERECTOMY WITH SALPINGO OOPHORECTOMY  09/25/1990    incidental appendectomy.Uterine fibroids      SOCIAL HISTORY     Social History     Socioeconomic History   • Marital status:    Tobacco Use   • Smoking status: Never Smoker   • Smokeless tobacco: Never Used   Vaping Use   • Vaping Use: Never used   Substance and Sexual Activity   • Alcohol use: No   • Drug use: No   • Sexual activity: Never      ALLERGIES   Patient has no known allergies.   MEDICATIONS     Current Outpatient Medications   Medication Sig Dispense Refill   • amoxicillin (AMOXIL) 500 MG capsule      • ascorbic acid (VITAMIN C) 500 MG tablet 500 mg.     • B Complex-Biotin-FA (B-50 COMPLEX PO) Take  by mouth.     • Cholecalciferol (VITAMIN D3 PO) Take 1,000 Units by mouth.     • ferrous sulfate 324 (65 Fe) MG tablet delayed-release EC tablet Take 324 mg by mouth Daily With Breakfast.     • gabapentin (NEURONTIN) 600 MG tablet TAKE 1/2 TO 1 TABLET  BY MOUTH THREE (3) TIMES DAILY AS NEEDED 270 tablet 0   • HYDROcodone-acetaminophen (NORCO) 7.5-325 MG per tablet Take 1 tablet by mouth Every 6 (Six) Hours As Needed for Mild Pain  or Moderate Pain . 60 tablet 0   • hydrocortisone 2.5 % cream Apply 1 application topically to the appropriate area as directed 2 (Two) Times a Day As Needed (hemorrhoid pain). 28 g 3   • loratadine (CLARITIN) 10 MG tablet Take 1 tablet by mouth Daily. 30 tablet 11   • Magnesium Gluconate 550 MG tablet 500 mg.     • Omeprazole Magnesium (PRILOSEC PO) 20 mg.     • pramipexole (MIRAPEX) 0.5 MG tablet Take 2 tablets by mouth Every Night. 180 tablet 2   • sertraline (Zoloft) 100 MG tablet Take 1 tablet by mouth Daily. 90 tablet 3   • trimethoprim-polymyxin b (POLYTRIM) 37751-8.1 UNIT/ML-% ophthalmic solution Administer 1 drop to the right eye Every 4 (Four) Hours. 10 mL 0   • DULoxetine (CYMBALTA) 20 MG capsule Take 1 capsule by mouth Every Morning. 30 capsule 1   • fluticasone (FLONASE) 50 MCG/ACT nasal spray 2 sprays into the nostril(s) as directed by provider Daily. 1 bottle 2   • ipratropium (ATROVENT) 0.06 % nasal spray 2 sprays into the nostril(s) as directed by provider 3 (Three) Times a Day. 15 mL 11   • lidocaine-hydrocortisone 3-0.5 % cream rectal cream Insert 1 application into the rectum 2 (Two) Times a Day As Needed (HEMORRHOID). 1 tube 11   • loperamide (HM Loperamide HCl) 2 MG capsule 2 now then 1 after each loose stool, max of 6 per day. 30 capsule 0   • meclizine (ANTIVERT) 12.5 MG tablet Take 1 tablet by mouth 3 (Three) Times a Day As Needed for Dizziness. 90 tablet 11   • methocarbamol (Robaxin) 500 MG tablet Take 1 tablet by mouth At Night As Needed for Muscle Spasms. Discard the relafen, I  Meant robaxin. Sorry. 30 tablet 11   • naproxen (NAPROSYN) 375 MG tablet Take 1 tablet by mouth 2 (Two) Times a Day As Needed for Moderate Pain . May also take tylenol with this. Replaces hydrocodone 60 tablet 11   • promethazine  (PHENERGAN) 25 MG tablet TAKE 1 TABLET BY MOUTH EVERY 6 (SIX) HOURS AS NEEDED FOR NAUSEA OR VOMITING - - MAY CAUSE DROWSINESS 30 tablet 0     No current facility-administered medications for this visit.        The following portions of the patient's history were reviewed and updated as appropriate: allergies, current medications, past family history, past medical history, past social history, past surgical history and problem list.        Assessment & Plan   Diagnoses and all orders for this visit:    1. Other fatigue (Primary)  -     CBC (No Diff)  -     Comprehensive Metabolic Panel  -     TSH  -     POCT SARS-CoV-2 Antigen MALCOM + Flu    2. Neuropathy of both feet    3. Vertigo  -     TSH  -     Folate  -     Vitamin B1, Whole Blood  -     Vitamin B12  -     Vitamin B6    Other orders  -     DULoxetine (CYMBALTA) 20 MG capsule; Take 1 capsule by mouth Every Morning.  Dispense: 30 capsule; Refill: 1  -     loperamide (HM Loperamide HCl) 2 MG capsule; 2 now then 1 after each loose stool, max of 6 per day.  Dispense: 30 capsule; Refill: 0    I strongly suspect neurontin causing her dizziness.    Will start low dose cymbalta for her fatigue and neuropathy.  She is to decrease her gabapentin to lowest effective dose.      Return 4 weeks.     Medicine for her diarrhea.    Covid/flu neg.                    No follow-ups on file.                  This document has been electronically signed by Sulaiman Robert MD on July 6, 2022 18:30 CDT

## 2022-07-07 LAB
ALBUMIN SERPL-MCNC: 4.5 G/DL (ref 3.5–5.2)
ALBUMIN/GLOB SERPL: 1.5 G/DL
ALP SERPL-CCNC: 86 U/L (ref 39–117)
ALT SERPL W P-5'-P-CCNC: 18 U/L (ref 1–33)
ANION GAP SERPL CALCULATED.3IONS-SCNC: 10.6 MMOL/L (ref 5–15)
AST SERPL-CCNC: 17 U/L (ref 1–32)
BILIRUB SERPL-MCNC: <0.2 MG/DL (ref 0–1.2)
BUN SERPL-MCNC: 17 MG/DL (ref 8–23)
BUN/CREAT SERPL: 25.8 (ref 7–25)
CALCIUM SPEC-SCNC: 9 MG/DL (ref 8.6–10.5)
CHLORIDE SERPL-SCNC: 102 MMOL/L (ref 98–107)
CO2 SERPL-SCNC: 26.4 MMOL/L (ref 22–29)
CREAT SERPL-MCNC: 0.66 MG/DL (ref 0.57–1)
DEPRECATED RDW RBC AUTO: 40.7 FL (ref 37–54)
EGFRCR SERPLBLD CKD-EPI 2021: 88.3 ML/MIN/1.73
ERYTHROCYTE [DISTWIDTH] IN BLOOD BY AUTOMATED COUNT: 13.1 % (ref 12.3–15.4)
FOLATE SERPL-MCNC: >20 NG/ML (ref 4.78–24.2)
GLOBULIN UR ELPH-MCNC: 3 GM/DL
GLUCOSE SERPL-MCNC: 121 MG/DL (ref 65–99)
HCT VFR BLD AUTO: 39.5 % (ref 34–46.6)
HGB BLD-MCNC: 13 G/DL (ref 12–15.9)
MCH RBC QN AUTO: 28.4 PG (ref 26.6–33)
MCHC RBC AUTO-ENTMCNC: 32.9 G/DL (ref 31.5–35.7)
MCV RBC AUTO: 86.2 FL (ref 79–97)
PLATELET # BLD AUTO: 286 10*3/MM3 (ref 140–450)
PMV BLD AUTO: 10.8 FL (ref 6–12)
POTASSIUM SERPL-SCNC: 4.4 MMOL/L (ref 3.5–5.2)
PROT SERPL-MCNC: 7.5 G/DL (ref 6–8.5)
RBC # BLD AUTO: 4.58 10*6/MM3 (ref 3.77–5.28)
SODIUM SERPL-SCNC: 139 MMOL/L (ref 136–145)
TSH SERPL DL<=0.05 MIU/L-ACNC: 3.03 UIU/ML (ref 0.27–4.2)
VIT B12 BLD-MCNC: 640 PG/ML (ref 211–946)
WBC NRBC COR # BLD: 9.7 10*3/MM3 (ref 3.4–10.8)

## 2022-07-11 LAB — VIT B1 BLD-SCNC: 219.9 NMOL/L (ref 66.5–200)

## 2022-07-12 LAB — VIT B6 SERPL-MCNC: 133.7 UG/L (ref 3.4–65.2)

## 2022-08-08 RX ORDER — PRAMIPEXOLE DIHYDROCHLORIDE 0.5 MG/1
1 TABLET ORAL NIGHTLY
Qty: 180 TABLET | Refills: 3 | Status: SHIPPED | OUTPATIENT
Start: 2022-08-08

## 2022-08-17 ENCOUNTER — TELEPHONE (OUTPATIENT)
Dept: FAMILY MEDICINE CLINIC | Facility: CLINIC | Age: 82
End: 2022-08-17

## 2022-08-17 NOTE — TELEPHONE ENCOUNTER
PATIENT STATES SHE CANNOT TAKE CYMBALTA.  IT MAKES HER TOO SLEEPY, SHE CAN'T FUNCTION. SHE STATES IT DID SEEM TO HELP HER FEET AND NEUROPATHY.  DOES SHE NEED TO TAPER OFF OF IT?  IS THERE SOMETHING ELSE YOU WANT TO TRY?  ARSHEED

## 2022-09-28 RX ORDER — LOPERAMIDE HYDROCHLORIDE 2 MG/1
CAPSULE ORAL
Qty: 30 CAPSULE | Refills: 0 | Status: SHIPPED | OUTPATIENT
Start: 2022-09-28 | End: 2023-01-10

## 2022-11-10 RX ORDER — PROMETHAZINE HYDROCHLORIDE 25 MG/1
TABLET ORAL
Qty: 30 TABLET | Refills: 0 | Status: SHIPPED | OUTPATIENT
Start: 2022-11-10

## 2022-12-22 DIAGNOSIS — G57.93 NEUROPATHY OF BOTH FEET: ICD-10-CM

## 2022-12-22 RX ORDER — SERTRALINE HYDROCHLORIDE 100 MG/1
TABLET, FILM COATED ORAL
Qty: 90 TABLET | Refills: 1 | Status: SHIPPED | OUTPATIENT
Start: 2022-12-22

## 2022-12-22 RX ORDER — GABAPENTIN 600 MG/1
TABLET ORAL
Qty: 270 TABLET | Refills: 0 | Status: SHIPPED | OUTPATIENT
Start: 2022-12-22 | End: 2023-03-22 | Stop reason: ALTCHOICE

## 2023-01-10 RX ORDER — LOPERAMIDE HYDROCHLORIDE 2 MG/1
CAPSULE ORAL
Qty: 30 CAPSULE | Refills: 0 | Status: SHIPPED | OUTPATIENT
Start: 2023-01-10

## 2023-01-10 RX ORDER — NAPROXEN 375 MG/1
TABLET ORAL
Qty: 60 TABLET | Refills: 0 | Status: SHIPPED | OUTPATIENT
Start: 2023-01-10

## 2023-03-20 DIAGNOSIS — G57.93 NEUROPATHY OF BOTH FEET: ICD-10-CM

## 2023-03-20 RX ORDER — GABAPENTIN 600 MG/1
TABLET ORAL
Qty: 270 TABLET | Refills: 0 | OUTPATIENT
Start: 2023-03-20

## 2023-03-22 ENCOUNTER — OFFICE VISIT (OUTPATIENT)
Dept: FAMILY MEDICINE CLINIC | Facility: CLINIC | Age: 83
End: 2023-03-22
Payer: MEDICARE

## 2023-03-22 ENCOUNTER — LAB (OUTPATIENT)
Dept: LAB | Facility: HOSPITAL | Age: 83
End: 2023-03-22
Payer: MEDICARE

## 2023-03-22 VITALS
TEMPERATURE: 97.7 F | OXYGEN SATURATION: 94 % | SYSTOLIC BLOOD PRESSURE: 132 MMHG | HEART RATE: 82 BPM | BODY MASS INDEX: 30.49 KG/M2 | HEIGHT: 65 IN | DIASTOLIC BLOOD PRESSURE: 77 MMHG | WEIGHT: 183 LBS

## 2023-03-22 DIAGNOSIS — M79.2 NERVE PAIN: ICD-10-CM

## 2023-03-22 DIAGNOSIS — G89.29 CHRONIC RIGHT-SIDED LOW BACK PAIN WITH RIGHT-SIDED SCIATICA: ICD-10-CM

## 2023-03-22 DIAGNOSIS — Z78.0 POST-MENOPAUSAL: ICD-10-CM

## 2023-03-22 DIAGNOSIS — Z12.31 SCREENING MAMMOGRAM FOR BREAST CANCER: ICD-10-CM

## 2023-03-22 DIAGNOSIS — M54.41 CHRONIC RIGHT-SIDED LOW BACK PAIN WITH RIGHT-SIDED SCIATICA: ICD-10-CM

## 2023-03-22 DIAGNOSIS — H10.9 CONJUNCTIVITIS OF RIGHT EYE, UNSPECIFIED CONJUNCTIVITIS TYPE: ICD-10-CM

## 2023-03-22 DIAGNOSIS — G89.29 CHRONIC PAIN OF RIGHT KNEE: ICD-10-CM

## 2023-03-22 DIAGNOSIS — M25.561 CHRONIC PAIN OF RIGHT KNEE: ICD-10-CM

## 2023-03-22 DIAGNOSIS — Z00.00 ENCOUNTER FOR SUBSEQUENT ANNUAL WELLNESS VISIT IN MEDICARE PATIENT: Primary | ICD-10-CM

## 2023-03-22 PROCEDURE — 80061 LIPID PANEL: CPT | Performed by: FAMILY MEDICINE

## 2023-03-22 PROCEDURE — G0481 DRUG TEST DEF 8-14 CLASSES: HCPCS | Performed by: FAMILY MEDICINE

## 2023-03-22 PROCEDURE — 80307 DRUG TEST PRSMV CHEM ANLYZR: CPT | Performed by: FAMILY MEDICINE

## 2023-03-22 RX ORDER — HYDROCODONE BITARTRATE AND ACETAMINOPHEN 7.5; 325 MG/1; MG/1
1 TABLET ORAL EVERY 6 HOURS PRN
Qty: 60 TABLET | Refills: 0 | Status: SHIPPED | OUTPATIENT
Start: 2023-03-22

## 2023-03-22 RX ORDER — POLYMYXIN B SULFATE AND TRIMETHOPRIM 1; 10000 MG/ML; [USP'U]/ML
1 SOLUTION OPHTHALMIC EVERY 4 HOURS
Qty: 10 ML | Refills: 0 | Status: SHIPPED | OUTPATIENT
Start: 2023-03-22

## 2023-03-22 RX ORDER — PREGABALIN 75 MG/1
75-150 CAPSULE ORAL NIGHTLY
Qty: 60 CAPSULE | Refills: 5 | Status: SHIPPED | OUTPATIENT
Start: 2023-03-22

## 2023-03-22 NOTE — PROGRESS NOTES
The ABCs of the Annual Wellness Visit  Subsequent Medicare Wellness Visit    Subjective    Sara Solis is a 82 y.o. female who presents for a Subsequent Medicare Wellness Visit.    The following portions of the patient's history were reviewed and   updated as appropriate: allergies, current medications, past family history, past medical history, past social history, past surgical history and problem list.    Compared to one year ago, the patient feels her physical   health is the same.    Compared to one year ago, the patient feels her mental   health is the same.    Recent Hospitalizations:  She was not admitted to the hospital during the last year.       Current Medical Providers:  Patient Care Team:  Sulaiman Robert MD as PCP - General (Family Medicine)  Vincenzo Holly DPM as Consulting Physician (Podiatry)  Basil Ulrich MD as Surgeon (Orthopedic Surgery)    Outpatient Medications Prior to Visit   Medication Sig Dispense Refill   • ascorbic acid (VITAMIN C) 500 MG tablet 1 tablet.     • B Complex-Biotin-FA (B-50 COMPLEX PO) Take  by mouth.     • Cholecalciferol (VITAMIN D3 PO) Take 1,000 Units by mouth.     • DULoxetine (CYMBALTA) 20 MG capsule Take 1 capsule by mouth Every Morning. 30 capsule 1   • ferrous sulfate 324 (65 Fe) MG tablet delayed-release EC tablet Take 1 tablet by mouth Daily With Breakfast.     • fluticasone (FLONASE) 50 MCG/ACT nasal spray 2 sprays into the nostril(s) as directed by provider Daily. 1 bottle 2   • gabapentin (NEURONTIN) 600 MG tablet TAKE 1/2 TO 1 TABLET BY MOUTH THREE (3) TIMES DAILY AS NEEDED 270 tablet 0   • HYDROcodone-acetaminophen (NORCO) 7.5-325 MG per tablet Take 1 tablet by mouth Every 6 (Six) Hours As Needed for Mild Pain  or Moderate Pain . 60 tablet 0   • hydrocortisone 2.5 % cream Apply 1 application topically to the appropriate area as directed 2 (Two) Times a Day As Needed (hemorrhoid pain). 28 g 3   • loperamide (IMODIUM) 2 MG capsule TAKE  2 CAPSULES BY MOUTH NOW THEN 1 AFTER EACH LOOSE STOOL, MAX OF 6 PER DAY. 30 capsule 0   • loratadine (CLARITIN) 10 MG tablet Take 1 tablet by mouth Daily. 30 tablet 11   • Magnesium Gluconate 550 MG tablet 500 mg.     • meclizine (ANTIVERT) 12.5 MG tablet Take 1 tablet by mouth 3 (Three) Times a Day As Needed for Dizziness. 90 tablet 11   • methocarbamol (Robaxin) 500 MG tablet Take 1 tablet by mouth At Night As Needed for Muscle Spasms. Discard the relafen, I  Meant robaxin. Sorry. 30 tablet 11   • naproxen (NAPROSYN) 375 MG tablet TAKE 1 TABLET BY MOUTH TWO (2) TIMES A DAY AS NEEDED WITH FOOD 60 tablet 0   • Omeprazole Magnesium (PRILOSEC PO) 20 mg.     • pramipexole (MIRAPEX) 0.5 MG tablet TAKE 2 TABLETS BY MOUTH EVERY NIGHT. 180 tablet 3   • sertraline (ZOLOFT) 100 MG tablet TAKE 1 TABLET BY MOUTH EVERY DAY 90 tablet 1   • amoxicillin (AMOXIL) 500 MG capsule      • ipratropium (ATROVENT) 0.06 % nasal spray 2 sprays into the nostril(s) as directed by provider 3 (Three) Times a Day. (Patient not taking: Reported on 3/22/2023) 15 mL 11   • lidocaine-hydrocortisone 3-0.5 % cream rectal cream Insert 1 application into the rectum 2 (Two) Times a Day As Needed (HEMORRHOID). (Patient not taking: Reported on 3/22/2023) 1 tube 11   • promethazine (PHENERGAN) 25 MG tablet TAKE 1 TABLET BY MOUTH EVERY 6 (SIX) HOURS AS NEEDED FOR NAUSEA OR VOMITING - - MAY CAUSE DROWSINESS (Patient not taking: Reported on 3/22/2023) 30 tablet 0   • trimethoprim-polymyxin b (POLYTRIM) 13439-9.1 UNIT/ML-% ophthalmic solution Administer 1 drop to the right eye Every 4 (Four) Hours. (Patient not taking: Reported on 3/22/2023) 10 mL 0     No facility-administered medications prior to visit.       Opioid medication/s are on active medication list.  and I have evaluated her active treatment plan and pain score trends (see table).  There were no vitals filed for this visit.  I have reviewed the chart for potential of high risk medication and harmful  "drug interactions in the elderly.            Aspirin is not on active medication list.  Aspirin use is not indicated based on review of current medical condition/s. Risk of harm outweighs potential benefits.  .    Patient Active Problem List   Diagnosis   • Low back pain   • Actinic keratosis   • Shoulder pain, left   • Pure hypercholesterolemia   • Nausea   • History of fatigue   • Insomnia   • Closed subcapital fracture of right femur (HCC)   • Tear film insufficiency     Advance Care Planning  Advance Directive is not on file.  ACP discussion was held with the patient during this visit. Patient does not have an advance directive, information provided.     Objective    There were no vitals filed for this visit.  Estimated body mass index is 29.62 kg/m² as calculated from the following:    Height as of 7/6/22: 165.1 cm (65\").    Weight as of 7/6/22: 80.7 kg (178 lb).    BMI is >= 25 and <30. (Overweight) The following options were offered after discussion;: none (medical contraindication)      Does the patient have evidence of cognitive impairment? No          HEALTH RISK ASSESSMENT    Smoking Status:  Social History     Tobacco Use   Smoking Status Never   • Passive exposure: Never   Smokeless Tobacco Never     Alcohol Consumption:  Social History     Substance and Sexual Activity   Alcohol Use No     Fall Risk Screen:    STEADI Fall Risk Assessment was completed, and patient is at LOW risk for falls.Assessment completed on:3/22/2023    Depression Screening:  PHQ-2/PHQ-9 Depression Screening 3/22/2023   Little Interest or Pleasure in Doing Things 0-->not at all   Feeling Down, Depressed or Hopeless 0-->not at all   PHQ-9: Brief Depression Severity Measure Score 0       Health Habits and Functional and Cognitive Screening:  Functional & Cognitive Status 3/22/2023   Do you have difficulty preparing food and eating? No   Do you have difficulty bathing yourself, getting dressed or grooming yourself? No   Do you have " difficulty using the toilet? No   Do you have difficulty moving around from place to place? No   Do you have trouble with steps or getting out of a bed or a chair? No   Current Diet Limited Junk Food   Dental Exam Up to date   Eye Exam Not up to date   Exercise (times per week) 0 times per week   Current Exercises Include No Regular Exercise   Do you need help using the phone?  No   Are you deaf or do you have serious difficulty hearing?  Yes   Do you need help with transportation? No   Do you need help shopping? No   Do you need help preparing meals?  No   Do you need help with housework?  No   Do you need help with laundry? No   Do you need help taking your medications? No   Do you need help managing money? No   Do you ever drive or ride in a car without wearing a seat belt? No   Have you felt unusual stress, anger or loneliness in the last month? No   Who do you live with? Alone   If you need help, do you have trouble finding someone available to you? No   Have you been bothered in the last four weeks by sexual problems? No   Do you have difficulty concentrating, remembering or making decisions? No       Age-appropriate Screening Schedule:  Refer to the list below for future screening recommendations based on patient's age, sex and/or medical conditions. Orders for these recommended tests are listed in the plan section. The patient has been provided with a written plan.    Health Maintenance   Topic Date Due   • MAMMOGRAM  03/22/2023 (Originally 9/16/2022)   • LIPID PANEL  03/22/2023 (Originally 7/20/2022)   • DXA SCAN  03/22/2023 (Originally 4/20/2013)   • COLORECTAL CANCER SCREENING  03/22/2023 (Originally 1940)   • INFLUENZA VACCINE  04/08/2023 (Originally 8/1/2022)   • ANNUAL WELLNESS VISIT  04/08/2023 (Originally 7/19/2022)   • ZOSTER VACCINE (2 of 3) 03/22/2024 (Originally 4/11/2007)   • TDAP/TD VACCINES (3 - Td or Tdap) 07/19/2031   • Hepatitis B  Completed   • COVID-19 Vaccine  Completed   •  Pneumococcal Vaccine 65+  Completed                CMS Preventative Services Quick Reference  Risk Factors Identified During Encounter  Chronic Pain: she is on medicine for her pain  The above risks/problems have been discussed with the patient.  Pertinent information has been shared with the patient in the After Visit Summary.  An After Visit Summary and PPPS were made available to the patient.    Follow Up:   Next Medicare Wellness visit to be scheduled in 1 year.       Additional E&M Note during same encounter follows:  Patient has multiple medical problems which are significant and separately identifiable that require additional work above and beyond the Medicare Wellness Visit.      Chief Complaint  Eye Trauma, Medicare Wellness-subsequent, and Med Refill    Subjective        HPI  Sara Solis is also being seen today for her chronic pain medicine.  She has made 60 norco last more than a year.  She is taking 1200mg gabapentin at night for her neuropathy in her feet, it is helping some.   Also  Taking trash out yesterday, a tree limb stuck her right eye, she says her right eyelids were stuck together this am.   Also  Continues to have dizziness.    Review of Systems   Constitutional: Negative.    HENT: Negative.    Eyes: Positive for discharge.   Respiratory: Negative.    Cardiovascular: Negative.    Gastrointestinal: Negative.    Endocrine: Negative.    Genitourinary: Negative.    Musculoskeletal: Negative.    Skin: Negative.    Allergic/Immunologic: Negative.    Neurological: Positive for dizziness.   Hematological: Negative.    Psychiatric/Behavioral: Negative.        Objective   Vital Signs:  There were no vitals taken for this visit.    Physical Exam  Vitals and nursing note reviewed.   Constitutional:       Appearance: Normal appearance. She is well-developed.   HENT:      Head: Normocephalic and atraumatic.      Right Ear: External ear normal.      Left Ear: External ear normal.      Nose: Nose  normal. No rhinorrhea.   Eyes:      General: No scleral icterus.     Extraocular Movements: Extraocular movements intact.      Pupils: Pupils are equal, round, and reactive to light.      Comments: Sclera right eye maybe slightly injected.    Cardiovascular:      Rate and Rhythm: Normal rate and regular rhythm.      Heart sounds: Normal heart sounds.     No friction rub. No gallop.   Pulmonary:      Effort: Pulmonary effort is normal.      Breath sounds: Normal breath sounds.   Abdominal:      General: Bowel sounds are normal. There is no distension.      Palpations: Abdomen is soft.      Tenderness: There is no abdominal tenderness.   Musculoskeletal:         General: No deformity. Normal range of motion.      Cervical back: Normal range of motion and neck supple.   Skin:     General: Skin is warm and dry.      Findings: No erythema or rash.   Neurological:      Mental Status: She is alert and oriented to person, place, and time.      Cranial Nerves: No cranial nerve deficit.   Psychiatric:         Behavior: Behavior normal.         Thought Content: Thought content normal.         Judgment: Judgment normal.                         Assessment and Plan   Diagnoses and all orders for this visit:    1. Encounter for subsequent annual wellness visit in Medicare patient (Primary)  -     Lipid Panel  -     DEXA Bone Density Axial  -     Mammo Screening Digital Tomosynthesis Bilateral With CAD    2. Nerve pain  -     ToxASSURE Select 13 (MW) - Urine, Clean Catch  -     pregabalin (Lyrica) 75 MG capsule; Take 1-2 capsules by mouth Every Night. REPLACES GABAPENTIN  Dispense: 60 capsule; Refill: 5  -     HYDROcodone-acetaminophen (NORCO) 7.5-325 MG per tablet; Take 1 tablet by mouth Every 6 (Six) Hours As Needed for Mild Pain or Moderate Pain.  Dispense: 60 tablet; Refill: 0    3. Screening mammogram for breast cancer  -     Mammo Screening Digital Tomosynthesis Bilateral With CAD    4. Post-menopausal  -     DEXA Bone  Density Axial    5. Conjunctivitis of right eye, unspecified conjunctivitis type  -     trimethoprim-polymyxin b (Polytrim) 77108-3.1 UNIT/ML-% ophthalmic solution; Administer 1 drop to the right eye Every 4 (Four) Hours.  Dispense: 10 mL; Refill: 0    6. Chronic right-sided low back pain with right-sided sciatica  -     HYDROcodone-acetaminophen (NORCO) 7.5-325 MG per tablet; Take 1 tablet by mouth Every 6 (Six) Hours As Needed for Mild Pain or Moderate Pain.  Dispense: 60 tablet; Refill: 0    7. Chronic pain of right knee  -     HYDROcodone-acetaminophen (NORCO) 7.5-325 MG per tablet; Take 1 tablet by mouth Every 6 (Six) Hours As Needed for Mild Pain or Moderate Pain.  Dispense: 60 tablet; Refill: 0    eye drops  Changed gabapentin to lyrica, warned side effects.  Start with one   Give stool fit test           Follow Up   No follow-ups on file.  Patient was given instructions and counseling regarding her condition or for health maintenance advice. Please see specific information pulled into the AVS if appropriate.

## 2023-03-23 LAB
CHOLEST SERPL-MCNC: 252 MG/DL (ref 0–200)
HDLC SERPL-MCNC: 55 MG/DL (ref 40–60)
LDLC SERPL CALC-MCNC: 183 MG/DL (ref 0–100)
LDLC/HDLC SERPL: 3.28 {RATIO}
TRIGL SERPL-MCNC: 83 MG/DL (ref 0–150)
VLDLC SERPL-MCNC: 14 MG/DL (ref 5–40)

## 2023-03-30 LAB — DRUGS UR: NORMAL

## 2023-04-12 ENCOUNTER — TELEPHONE (OUTPATIENT)
Dept: FAMILY MEDICINE CLINIC | Facility: CLINIC | Age: 83
End: 2023-04-12
Payer: MEDICARE

## 2023-04-12 RX ORDER — MECLIZINE HCL 12.5 MG/1
12.5 TABLET ORAL 3 TIMES DAILY PRN
Qty: 90 TABLET | Refills: 11 | Status: SHIPPED | OUTPATIENT
Start: 2023-04-12 | End: 2023-04-21 | Stop reason: SDUPTHER

## 2023-04-21 ENCOUNTER — TELEPHONE (OUTPATIENT)
Dept: FAMILY MEDICINE CLINIC | Facility: CLINIC | Age: 83
End: 2023-04-21
Payer: MEDICARE

## 2023-04-21 DIAGNOSIS — M79.2 NERVE PAIN: Primary | ICD-10-CM

## 2023-04-21 DIAGNOSIS — R42 DIZZINESS: Primary | ICD-10-CM

## 2023-04-21 RX ORDER — LORATADINE 10 MG/1
5 TABLET ORAL DAILY
Qty: 15 TABLET | Refills: 11 | Status: SHIPPED | OUTPATIENT
Start: 2023-04-21

## 2023-04-21 RX ORDER — MECLIZINE HCL 12.5 MG/1
12.5 TABLET ORAL 3 TIMES DAILY PRN
Qty: 90 TABLET | Refills: 3 | Status: SHIPPED | OUTPATIENT
Start: 2023-04-21

## 2023-04-21 RX ORDER — GABAPENTIN 800 MG/1
800 TABLET ORAL 3 TIMES DAILY
Qty: 270 TABLET | Refills: 1 | Status: SHIPPED | OUTPATIENT
Start: 2023-04-21

## 2023-04-21 NOTE — TELEPHONE ENCOUNTER
Mrs. Laffoon called and wanted to know if you could call her in something for itching she said she is itching all over, does not have a rash?  Incoming Refill Request      Medication requested (name and dose): Meclizine (Antivert) 12.5 MG    Pharmacy where request should be sent: [facility]    Additional details provided by patient: she is having vertigo    Best call back number: [phone]    Does the patient have less than a 3 day supply:  [x] Yes  [] No    Pascale Hebert Rep  04/21/23, 13:03 CDT

## 2023-04-21 NOTE — TELEPHONE ENCOUNTER
Mrs. Laffoon called and said that you had started her on Lyrica and took her off of Gabapentin, but the Lyrica is not working and she would like to go back on the Gabapentin.  [place]    She is also itching all over and wanted to know if you could give her something for itching?

## 2023-05-10 RX ORDER — LOPERAMIDE HYDROCHLORIDE 2 MG/1
CAPSULE ORAL
Qty: 30 CAPSULE | Refills: 11 | Status: SHIPPED | OUTPATIENT
Start: 2023-05-10

## 2023-05-15 ENCOUNTER — OFFICE VISIT (OUTPATIENT)
Dept: FAMILY MEDICINE CLINIC | Facility: CLINIC | Age: 83
End: 2023-05-15
Payer: MEDICARE

## 2023-05-15 VITALS
BODY MASS INDEX: 29.82 KG/M2 | HEIGHT: 65 IN | HEART RATE: 80 BPM | WEIGHT: 179 LBS | SYSTOLIC BLOOD PRESSURE: 148 MMHG | DIASTOLIC BLOOD PRESSURE: 77 MMHG | OXYGEN SATURATION: 96 % | TEMPERATURE: 97.3 F

## 2023-05-15 DIAGNOSIS — M54.50 ACUTE MIDLINE LOW BACK PAIN WITHOUT SCIATICA: ICD-10-CM

## 2023-05-15 DIAGNOSIS — M79.642 LEFT HAND PAIN: Primary | ICD-10-CM

## 2023-05-15 DIAGNOSIS — E78.5 HYPERLIPIDEMIA, UNSPECIFIED HYPERLIPIDEMIA TYPE: ICD-10-CM

## 2023-05-15 PROCEDURE — 1159F MED LIST DOCD IN RCRD: CPT | Performed by: FAMILY MEDICINE

## 2023-05-15 PROCEDURE — 99214 OFFICE O/P EST MOD 30 MIN: CPT | Performed by: FAMILY MEDICINE

## 2023-05-15 PROCEDURE — 1160F RVW MEDS BY RX/DR IN RCRD: CPT | Performed by: FAMILY MEDICINE

## 2023-05-15 RX ORDER — PRAVASTATIN SODIUM 40 MG
40 TABLET ORAL NIGHTLY
Qty: 90 TABLET | Refills: 3 | Status: SHIPPED | OUTPATIENT
Start: 2023-05-15

## 2023-05-15 NOTE — PROGRESS NOTES
" Subjective   Sara Arlet Solis is a 82 y.o. female.     Chief Complaint   Patient presents with   • Office Visit      Sore on left hand, aches up the arm              History of Present Illness     Sore on left posterior hand, prox to wrist over 2,3,4 mcp x 2 months.   No trauma, it will hurt all the way up her arm.  It hurt while driving today.   She says her neck is fine.   Also  Low back pain, midline.  Hurts when standing doing dishes. No fall/trauma.   Also  She now wants to try a statin drug for her lipids     Review of Systems   Constitutional: Negative for chills, fatigue and fever.   HENT: Negative for congestion, ear discharge, ear pain, facial swelling, hearing loss, postnasal drip, rhinorrhea, sinus pressure, sore throat, trouble swallowing and voice change.    Eyes: Negative for discharge, redness and visual disturbance.   Respiratory: Negative for cough, chest tightness, shortness of breath and wheezing.    Cardiovascular: Negative for chest pain and palpitations.   Gastrointestinal: Negative for abdominal pain, blood in stool, constipation, diarrhea, nausea and vomiting.   Endocrine: Negative for polydipsia and polyuria.   Genitourinary: Negative for dysuria, flank pain, hematuria and urgency.   Musculoskeletal: Positive for back pain. Negative for arthralgias, joint swelling and myalgias.   Skin: Negative for rash.   Neurological: Negative for dizziness, weakness, numbness and headaches.   Hematological: Negative for adenopathy.   Psychiatric/Behavioral: Negative for confusion and sleep disturbance. The patient is not nervous/anxious.            /77 (BP Location: Left arm)   Pulse 80   Temp 97.3 °F (36.3 °C)   Ht 165.1 cm (65\")   Wt 81.2 kg (179 lb)   SpO2 96%   BMI 29.79 kg/m²       Objective     Physical Exam  Vitals and nursing note reviewed.   Constitutional:       Appearance: Normal appearance. She is well-developed.   HENT:      Head: Normocephalic and atraumatic.      Right " Ear: External ear normal.      Left Ear: External ear normal.      Nose: Nose normal.   Eyes:      Extraocular Movements: Extraocular movements intact.      Conjunctiva/sclera: Conjunctivae normal.      Pupils: Pupils are equal, round, and reactive to light.   Pulmonary:      Effort: Pulmonary effort is normal.   Musculoskeletal:         General: Normal range of motion.      Cervical back: Normal range of motion.      Comments: Some bony prominence, left posterior, proximal 2,3,4 mcp joint.  No redness.  Mild tenderness to palpation.  Mild pain both si joints.    Neurological:      General: No focal deficit present.      Mental Status: She is alert and oriented to person, place, and time.   Psychiatric:         Behavior: Behavior normal.         Thought Content: Thought content normal.         Judgment: Judgment normal.             PAST MEDICAL HISTORY     Past Medical History:   Diagnosis Date   • Actinic keratosis    • Allergic rhinitis    • Altered bowel function    • Anxiety state    • Aptyalism    • Attention deficit hyperactivity disorder    • Bunion    • Callus    • Cough    • Degenerative joint disease involving multiple joints    • Depressive disorder    • Diarrhea    • Disorder of skin    • Fissure in skin    • Foot pain    • Generalized anxiety disorder    • Hammer toe    • Hepatitis    • Hepatitis C    • Herpes labialis    • History of bone scan 10/01/2008    DXA BONE DENSITY AXIAL 45393 (2) - Osteopenia of the lumbar spine, which has improved compared to the prior study. Normal bone mineral density of the hips, which has improved compared to the prior study   • History of colonoscopy 04/05/2011    Colon endoscopy 84711 (2) - Diverticulosis found in sigmoid colon. Moderate fixation of sigmoid colon. Internal hemorrhoids found in anus.    • History of esophagogastroduodenoscopy 04/05/2011    EGD w/ tube 32723 (1) - Normal hypopharynx, GE junction, duodenum, symmetrical & patent pylorus. Normal esophagus.  Dilatation performed. Chronic gastritis found in antrum. Biopsy taken.   • History of mammogram 10/01/2008    Mammogram, both breasts (1) - Negative mammogram. Recommend follow-up in 12 months;     • Incontinence of feces    • Increased frequency of urination    • Insomnia    • Irritable bowel syndrome    • Knee pain    • Lipoma of shoulder    • Low back pain    • Lumbosacral radiculopathy    • Nausea    • Nervous system abnormality     Disorder of the peripheral nervous system     • Osteopenia    • Pain in limb    • Pain in lower limb    • Plantar fasciitis    • Pure hypercholesterolemia    • Restless legs    • Skin lesion     nose   • Spasm    • Spasm of back muscles    • Squamous cell carcinoma of skin    • Stress fracture    • Tear film insufficiency    • Trochanteric bursitis of right hip    • Upper respiratory infection    • Urinary tract infectious disease    • Vertigo       PAST SURGICAL HISTORY     Past Surgical History:   Procedure Laterality Date   • BREAST BIOPSY  12/04/1995    BIOPSY OF BREAST OPEN 41446 (2) - Right,Exscision of mass.Fibroadenoma. Fibrocystic disease, proliferative   • CATARACT EXTRACTION WITH INTRAOCULAR LENS IMPLANT Right 03/07/2006    Remove cataract, insert lens (1) - right   • DILATATION AND CURETTAGE  10/02/1986    D&C (1) - Fractional D&C laparoscopic tubal sterilization. Plus uterine fibroids, approximately 10-12 weeks in size   • FOOT SURGERY  03/20/1990    Foot/toes surgery procedure (1) - Left,Excisional biopsy. Tumor, supposed fibroma, 5th toe   • HIP PINNING Right 12/21/2017    Procedure: HIP PINNING                 (C-ARM#3);  Surgeon: Basil Ulrich MD;  Location: [facility];  Service:    • INJECTION OF MEDICATION  06/08/2015    Susana (2) Keegan Robert   • LIVER BIOPSY  04/26/2000    Needle biopsy of liver (1) - percutaneous liver biopsy;     • OTHER SURGICAL HISTORY  10/17/2002    Chest procedure (2) - Intralesional injection of keloid of chest    • OTHER  SURGICAL HISTORY  10/25/2001    Remove axillary lymph nodes (1) - Biopsy, Left axillary adenopathy. 4 lymph nodes with reactive hyperplasia No tumor seen   • OTHER SURGICAL HISTORY  06/17/2013    Biopsy, Each Additional Lesion 49462 (1) - SERENA Robert   • OTHER SURGICAL HISTORY  07/14/2014    Destruction of Premalignant Lesion (1st) 53217 (2)  - SERENA Robert   • OTHER SURGICAL HISTORY  05/10/2016    Drain/Inject Major Joint 07515 (4) - SERENA Robert   • SKIN BIOPSY  07/28/2014    Biopsy of Skin 49753 (2)  - SERENA Robert   • TOTAL ABDOMINAL HYSTERECTOMY WITH SALPINGO OOPHORECTOMY  09/25/1990    incidental appendectomy.Uterine fibroids      SOCIAL HISTORY     Social History     Socioeconomic History   • Marital status:    Tobacco Use   • Smoking status: Never     Passive exposure: Never   • Smokeless tobacco: Never   Vaping Use   • Vaping Use: Never used   Substance and Sexual Activity   • Alcohol use: No   • Drug use: No   • Sexual activity: Never      ALLERGIES   Patient has no known allergies.   MEDICATIONS     Current Outpatient Medications   Medication Sig Dispense Refill   • ascorbic acid (VITAMIN C) 500 MG tablet 1 tablet.     • B Complex-Biotin-FA (B-50 COMPLEX PO) Take  by mouth.     • Cholecalciferol (VITAMIN D3 PO) Take 1,000 Units by mouth.     • DULoxetine (CYMBALTA) 20 MG capsule Take 1 capsule by mouth Every Morning. 30 capsule 1   • ferrous sulfate 324 (65 Fe) MG tablet delayed-release EC tablet Take 1 tablet by mouth Daily With Breakfast.     • fluticasone (FLONASE) 50 MCG/ACT nasal spray 2 sprays into the nostril(s) as directed by provider Daily. 1 bottle 2   • gabapentin (Neurontin) 800 MG tablet Take 1 tablet by mouth 3 (Three) Times a Day. OR AS DIRECTED.  LET HER KNOW THESE ARE 800MG NOT 600MG, I INCREASED THE DOSE. 270 tablet 1   • HYDROcodone-acetaminophen (NORCO) 7.5-325 MG per tablet Take 1 tablet by mouth Every 6 (Six) Hours As Needed for Mild Pain or Moderate Pain. 60 tablet 0   • hydrocortisone 2.5 %  cream Apply 1 application topically to the appropriate area as directed 2 (Two) Times a Day As Needed (hemorrhoid pain). 28 g 3   • loperamide (IMODIUM) 2 MG capsule TAKE 2 CAPSULES BY MOUTH NOW THEN 1 AFTER EACH LOOSE STOOL, MAX OF 6 PER DAY. 30 capsule 11   • loratadine (Claritin) 10 MG tablet Take 0.5 tablets by mouth Daily. 15 tablet 11   • Magnesium Gluconate 550 MG tablet 500 mg.     • meclizine (ANTIVERT) 12.5 MG tablet Take 1 tablet by mouth 3 (Three) Times a Day As Needed for Dizziness. 90 tablet 3   • naproxen (NAPROSYN) 375 MG tablet TAKE 1 TABLET BY MOUTH TWO (2) TIMES A DAY AS NEEDED WITH FOOD 60 tablet 0   • Omeprazole Magnesium (PRILOSEC PO) 20 mg.     • pramipexole (MIRAPEX) 0.5 MG tablet TAKE 2 TABLETS BY MOUTH EVERY NIGHT. 180 tablet 3   • promethazine (PHENERGAN) 25 MG tablet TAKE 1 TABLET BY MOUTH EVERY 6 (SIX) HOURS AS NEEDED FOR NAUSEA OR VOMITING - - MAY CAUSE DROWSINESS 30 tablet 0   • sertraline (ZOLOFT) 100 MG tablet TAKE 1 TABLET BY MOUTH EVERY DAY 90 tablet 1   • trimethoprim-polymyxin b (Polytrim) 34696-8.1 UNIT/ML-% ophthalmic solution Administer 1 drop to the right eye Every 4 (Four) Hours. 10 mL 0   • pravastatin (Pravachol) 40 MG tablet Take 1 tablet by mouth Every Night. 90 tablet 3     No current facility-administered medications for this visit.        The following portions of the patient's history were reviewed and updated as appropriate: allergies, current medications, past family history, past medical history, past social history, past surgical history and problem list.        Assessment & Plan   Diagnoses and all orders for this visit:    1. Left hand pain (Primary)  -     XR Hand 3+ View Left  -     Ambulatory Referral to Orthopedic Surgery    2. Acute midline low back pain without sciatica  -     XR Spine Lumbar 2 or 3 View    3. Hyperlipidemia, unspecified hyperlipidemia type    Other orders  -     pravastatin (Pravachol) 40 MG tablet; Take 1 tablet by mouth Every Night.   Dispense: 90 tablet; Refill: 3      Xrays,  Referral  Start pravachol.  Call If any side effects.                No follow-ups on file.                  This document has been electronically signed by Sulaiman Robert MD on May 15, 2023 14:04 CDT

## 2023-05-25 ENCOUNTER — OFFICE VISIT (OUTPATIENT)
Dept: ORTHOPEDIC SURGERY | Facility: CLINIC | Age: 83
End: 2023-05-25

## 2023-05-25 VITALS — HEIGHT: 65 IN | WEIGHT: 181 LBS | BODY MASS INDEX: 30.16 KG/M2

## 2023-05-25 DIAGNOSIS — M18.12 PRIMARY OSTEOARTHRITIS OF FIRST CARPOMETACARPAL JOINT OF LEFT HAND: ICD-10-CM

## 2023-05-25 DIAGNOSIS — M67.432 GANGLION CYST OF DORSUM OF LEFT WRIST: ICD-10-CM

## 2023-05-25 DIAGNOSIS — M79.642 LEFT HAND PAIN: Primary | ICD-10-CM

## 2023-05-25 NOTE — PROGRESS NOTES
Sara Solis is a 82 y.o. female   Primary provider:  Sulaiman Robert MD       Chief Complaint   Patient presents with    Left Hand - Pain, Initial Evaluation     HISTORY OF PRESENT ILLNESS:    History of Present Illness  82-year-old female patient presents to office accompanied by her family member for evaluation of chronic left hand pain.  Initial onset of pain occurred several years ago and has progressively worsened over time.  Patient was recently evaluated by her primary care physician, Dr. Robert, on 5/15/2023 and referred to orthopedics for further evaluation.  Patient has localized the pain along the proximal aspect of her left hand as well as the dorsal and radial wrist.  She has not sustained any falls or known injuries to her left hand or wrist.  Pain is described as constant and moderate in severity.  Pain is described as aching in nature with associated swelling and redness.  Pain is worse with use of her left hand/wrist and driving.  Pain improves mildly with rest and anti-inflammatory medications.  Patient currently takes naproxen 375 mg twice daily as needed for joint pain.  X-rays of the left hand performed in office today.  Current pain scale is 3/10.     CONCURRENT MEDICAL HISTORY:    Past Medical History:   Diagnosis Date    Actinic keratosis     Allergic rhinitis     Altered bowel function     Anxiety state     Aptyalism     Attention deficit hyperactivity disorder     Bunion     Callus     Cough     Degenerative joint disease involving multiple joints     Depressive disorder     Diarrhea     Disorder of skin     Fissure in skin     Foot pain     Generalized anxiety disorder     Hammer toe     Hepatitis     Hepatitis C     Herpes labialis     History of bone scan 10/01/2008    DXA BONE DENSITY AXIAL 53879 (2) - Osteopenia of the lumbar spine, which has improved compared to the prior study. Normal bone mineral density of the hips, which has improved compared to the prior study    History  of colonoscopy 04/05/2011    Colon endoscopy 45694 (2) - Diverticulosis found in sigmoid colon. Moderate fixation of sigmoid colon. Internal hemorrhoids found in anus.     History of esophagogastroduodenoscopy 04/05/2011    EGD w/ tube 98683 (1) - Normal hypopharynx, GE junction, duodenum, symmetrical & patent pylorus. Normal esophagus. Dilatation performed. Chronic gastritis found in antrum. Biopsy taken.    History of mammogram 10/01/2008    Mammogram, both breasts (1) - Negative mammogram. Recommend follow-up in 12 months;      Incontinence of feces     Increased frequency of urination     Insomnia     Irritable bowel syndrome     Knee pain     Lipoma of shoulder     Low back pain     Lumbosacral radiculopathy     Nausea     Nervous system abnormality     Disorder of the peripheral nervous system      Osteopenia     Pain in limb     Pain in lower limb     Plantar fasciitis     Pure hypercholesterolemia     Restless legs     Skin lesion     nose    Spasm     Spasm of back muscles     Squamous cell carcinoma of skin     Stress fracture     Tear film insufficiency     Trochanteric bursitis of right hip     Upper respiratory infection     Urinary tract infectious disease     Vertigo        No Known Allergies      Current Outpatient Medications:     ascorbic acid (VITAMIN C) 500 MG tablet, 1 tablet., Disp: , Rfl:     B Complex-Biotin-FA (B-50 COMPLEX PO), Take  by mouth., Disp: , Rfl:     Cholecalciferol (VITAMIN D3 PO), Take 1,000 Units by mouth., Disp: , Rfl:     ferrous sulfate 324 (65 Fe) MG tablet delayed-release EC tablet, Take 1 tablet by mouth Daily With Breakfast., Disp: , Rfl:     fluticasone (FLONASE) 50 MCG/ACT nasal spray, 2 sprays into the nostril(s) as directed by provider Daily., Disp: 1 bottle, Rfl: 2    gabapentin (Neurontin) 800 MG tablet, Take 1 tablet by mouth 3 (Three) Times a Day. OR AS DIRECTED.  LET HER KNOW THESE ARE 800MG NOT 600MG, I INCREASED THE DOSE., Disp: 270 tablet, Rfl: 1     HYDROcodone-acetaminophen (NORCO) 7.5-325 MG per tablet, Take 1 tablet by mouth Every 6 (Six) Hours As Needed for Mild Pain or Moderate Pain., Disp: 60 tablet, Rfl: 0    hydrocortisone 2.5 % cream, Apply 1 application topically to the appropriate area as directed 2 (Two) Times a Day As Needed (hemorrhoid pain)., Disp: 28 g, Rfl: 3    loperamide (IMODIUM) 2 MG capsule, TAKE 2 CAPSULES BY MOUTH NOW THEN 1 AFTER EACH LOOSE STOOL, MAX OF 6 PER DAY., Disp: 30 capsule, Rfl: 11    loratadine (Claritin) 10 MG tablet, Take 0.5 tablets by mouth Daily., Disp: 15 tablet, Rfl: 11    Magnesium Gluconate 550 MG tablet, 500 mg., Disp: , Rfl:     meclizine (ANTIVERT) 12.5 MG tablet, Take 1 tablet by mouth 3 (Three) Times a Day As Needed for Dizziness., Disp: 90 tablet, Rfl: 3    naproxen (NAPROSYN) 375 MG tablet, TAKE 1 TABLET BY MOUTH TWO (2) TIMES A DAY AS NEEDED WITH FOOD, Disp: 60 tablet, Rfl: 0    Omeprazole Magnesium (PRILOSEC PO), 20 mg., Disp: , Rfl:     pramipexole (MIRAPEX) 0.5 MG tablet, TAKE 2 TABLETS BY MOUTH EVERY NIGHT., Disp: 180 tablet, Rfl: 3    pravastatin (Pravachol) 40 MG tablet, Take 1 tablet by mouth Every Night., Disp: 90 tablet, Rfl: 3    promethazine (PHENERGAN) 25 MG tablet, TAKE 1 TABLET BY MOUTH EVERY 6 (SIX) HOURS AS NEEDED FOR NAUSEA OR VOMITING - - MAY CAUSE DROWSINESS, Disp: 30 tablet, Rfl: 0    sertraline (ZOLOFT) 100 MG tablet, TAKE 1 TABLET BY MOUTH EVERY DAY, Disp: 90 tablet, Rfl: 1    trimethoprim-polymyxin b (Polytrim) 88647-1.1 UNIT/ML-% ophthalmic solution, Administer 1 drop to the right eye Every 4 (Four) Hours., Disp: 10 mL, Rfl: 0    Past Surgical History:   Procedure Laterality Date    BREAST BIOPSY  12/04/1995    BIOPSY OF BREAST OPEN 19101 (2) - Right,Exscision of mass.Fibroadenoma. Fibrocystic disease, proliferative    CATARACT EXTRACTION WITH INTRAOCULAR LENS IMPLANT Right 03/07/2006    Remove cataract, insert lens (1) - right    DILATATION AND CURETTAGE  10/02/1986    D&C (1) -  Fractional D&C laparoscopic tubal sterilization. Plus uterine fibroids, approximately 10-12 weeks in size    FOOT SURGERY  03/20/1990    Foot/toes surgery procedure (1) - Left,Excisional biopsy. Tumor, supposed fibroma, 5th toe    HIP PINNING Right 12/21/2017    Procedure: HIP PINNING                 (C-ARM#[id number]);  Surgeon: Basil Ulrich MD;  Location: [facility];  Service:     INJECTION OF MEDICATION  06/08/2015    Kenalog (2) - SERENA Robert    LIVER BIOPSY  04/26/2000    Needle biopsy of liver (1) - percutaneous liver biopsy;      OTHER SURGICAL HISTORY  10/17/2002    Chest procedure (2) - Intralesional injection of keloid of chest     OTHER SURGICAL HISTORY  10/25/2001    Remove axillary lymph nodes (1) - Biopsy, Left axillary adenopathy. 4 lymph nodes with reactive hyperplasia No tumor seen    OTHER SURGICAL HISTORY  06/17/2013    Biopsy, Each Additional Lesion 00360 (1) - SERENA Robert    OTHER SURGICAL HISTORY  07/14/2014    Destruction of Premalignant Lesion (1st) 85254 (2)  - SERENA Robert    OTHER SURGICAL HISTORY  05/10/2016    Drain/Inject Major Joint 42811 (4) - SERENA Robert    SKIN BIOPSY  07/28/2014    Biopsy of Skin 95150 (2)  - SERENA Robert    TOTAL ABDOMINAL HYSTERECTOMY WITH SALPINGO OOPHORECTOMY  09/25/1990    incidental appendectomy.Uterine fibroids       Family History   Problem Relation Age of Onset    Melanoma Other     Stroke Other     Osteoporosis Mother     Cancer Father     Psoriasis Sister     Severe sprains Sister     Diabetes Sister     Heart disease Maternal Uncle     Heart disease Maternal Grandmother         Social History     Socioeconomic History    Marital status:    Tobacco Use    Smoking status: Never     Passive exposure: Never    Smokeless tobacco: Never   Vaping Use    Vaping Use: Never used   Substance and Sexual Activity    Alcohol use: No    Drug use: No    Sexual activity: Never        Review of Systems   Constitutional: Negative.    HENT:  Positive for hearing loss and postnasal  "drip.    Eyes:  Positive for pain and visual disturbance.        Dry eyes   Respiratory: Negative.     Cardiovascular: Negative.    Gastrointestinal:  Positive for diarrhea.   Endocrine: Negative.    Genitourinary: Negative.    Musculoskeletal:  Positive for arthralgias and joint swelling.        Joint pain/stiffness.  Muscle pain.  Left hand pain.   Skin: Negative.    Allergic/Immunologic: Negative.    Neurological:  Positive for dizziness and headaches.   Hematological: Negative.    Psychiatric/Behavioral: Negative.       PHYSICAL EXAMINATION:       Ht 165.1 cm (65\")   Wt 82.1 kg (181 lb)   BMI 30.12 kg/m²     Physical Exam  Vitals reviewed.   Constitutional:       General: She is not in acute distress.     Appearance: She is well-developed. She is not ill-appearing.   HENT:      Head: Normocephalic.   Pulmonary:      Effort: Pulmonary effort is normal. No respiratory distress.   Abdominal:      General: There is no distension.      Palpations: Abdomen is soft.   Musculoskeletal:         General: Swelling (Mild, left wrist/hand) and tenderness (Mild, left wrist/hand) present. No deformity or signs of injury.   Skin:     General: Skin is warm and dry.      Capillary Refill: Capillary refill takes less than 2 seconds.      Findings: No erythema.   Neurological:      Mental Status: She is alert and oriented to person, place, and time.      GCS: GCS eye subscore is 4. GCS verbal subscore is 5. GCS motor subscore is 6.   Psychiatric:         Speech: Speech normal.         Behavior: Behavior normal.         Thought Content: Thought content normal.         Judgment: Judgment normal.     GAIT:     [x]  Normal  []  Antalgic    Assistive device: [x]  None  []  Walker     []  Crutches  []  Cane     []  Wheelchair  []  Stretcher    Left Hand Exam     Tenderness   The patient is experiencing tenderness in the dorsal area (Mild, dorsal wrist/proximal hand, 1st CMC joint).     Muscle Strength   Left wrist normal muscle " strength: deferred.    Other   Erythema: absent  Sensation: normal  Pulse: present    Comments:  Mild pain and limitations with range of motion of the wrist joint and proximal thumb.  There is a small palpable, nonmobile mass noted to the dorsal wrist/proximal-dorsal hand that is best visualized with the wrist flexed and most consistent with possible ganglion cyst.  Mild tenderness at the first CMC joint.  Overall good motion of the wrist/hand/fingers.  No warmth or erythema.  No signs of infection noted.  No ecchymosis.  No signs of injury noted.  Neurovascularly intact.          XR Hand 3+ View Left    Result Date: 5/25/2023  Narrative: INDICATION: Pain. FINDINGS: Degenerative changes are seen most notable at the first CMC joint. Mild narrowing at the radiocarpal joint. Mild degenerative change involving the DIPs. No other significant findings. No bony erosions. SUMMARY: Features of osteoarthritis with changes most severe at the first CMC joint.     ASSESSMENT:    Diagnoses and all orders for this visit:    Left hand pain  -     XR Hand 3+ View Left  -     Small Joint Arthrocentesis: L thumb CMC    Primary osteoarthritis of first carpometacarpal joint of left hand  -     Small Joint Arthrocentesis: L thumb CMC    Ganglion cyst of dorsum of left wrist    PLAN    Small Joint Arthrocentesis: L thumb CMC  Consent given by: patient  Timeout: Immediately prior to procedure a time out was called to verify the correct patient, procedure, equipment, support staff and site/side marked as required   Supporting Documentation  Indications: pain, joint swelling and diagnostic evaluation   Procedure Details  Location: thumb - L thumb CMC  Preparation: Patient was prepped and draped in the usual sterile fashion  Needle size: 25 G  Approach: radial  Medications administered: 40 mg triamcinolone acetonide 40 MG/ML; 1 mL lidocaine PF 1% 1 %  Patient tolerance: patient tolerated the procedure well with no immediate  complications    X-rays of the left hand performed in office today and reviewed with no acute findings noted.  Patient has evidence of moderate to severe degenerative changes at the first carpometacarpal joint and mild to moderate narrowing of the radiocarpal joint as well.  Patient complains of chronic left hand/wrist pain that has been gradually worsening in recent months.  She has localized the pain primarily along the radial wrist as well as the proximal metacarpals.  She also has evidence of a possible ganglion cyst noted and we discussed that these are typically originating from joint spaces or tendon sheath.  Recommend proceeding today with a trial of an intra-articular injection of steroid into the left first CMC joint for management of joint pain/inflammation/swelling.     Recommend rest and activity modification with avoidance of over exertional use with her left hand/wrist for now, repetitive motions and any activities that exacerbate her pain.  Otherwise, patient can gradually progress her activity and use of her left hand/wrist as pain allows.  Recommend elevation and ice therapy as needed to minimize pain/swelling/inflammation.  Recommend to continue with naproxen 375 mg twice daily as needed for joint pain/inflammation.  Patient can also take Tylenol as needed for additional pain control.    Follow-up in 2 to 4 weeks for recheck as needed for any new, worsening or persistent symptoms.  Consider advanced imaging with MRI if indicated, if her pain persists or worsens.  Consider referral to physical therapy.  Consider trial of a thumb spica Velcro wrist splint.  We also discussed that if she gets good relief with the injection, she can follow-up on an as-needed basis.    Time spent of a minimum of 30 minutes including the face to face evaluation, reviewing of medical history and prior medial records, reviewing of diagnostic studies, documentation, patient education and coordination of care.      EMR  Dragon/Transciption Disclaimer: Some of this note may be an electronic transcription/translation of spoken language to printed text using the Dragon Dictation System.      Return in about 2 weeks (around 6/8/2023), or if symptoms worsen or fail to improve, for Recheck.       This document has been electronically signed by DEBORAH Carey on June 4, 2023 17:55 CDT       DEBORAH Carey

## 2023-06-04 PROBLEM — M67.432 GANGLION CYST OF DORSUM OF LEFT WRIST: Status: ACTIVE | Noted: 2023-06-04

## 2023-06-04 PROBLEM — M79.642 LEFT HAND PAIN: Status: ACTIVE | Noted: 2023-06-04

## 2023-06-04 PROBLEM — M18.12 PRIMARY OSTEOARTHRITIS OF FIRST CARPOMETACARPAL JOINT OF LEFT HAND: Status: ACTIVE | Noted: 2023-06-04

## 2023-06-04 RX ORDER — TRIAMCINOLONE ACETONIDE 40 MG/ML
40 INJECTION, SUSPENSION INTRA-ARTICULAR; INTRAMUSCULAR
Status: COMPLETED | OUTPATIENT
Start: 2023-06-04 | End: 2023-06-04

## 2023-06-04 RX ORDER — LIDOCAINE HYDROCHLORIDE 10 MG/ML
1 INJECTION, SOLUTION EPIDURAL; INFILTRATION; INTRACAUDAL; PERINEURAL
Status: COMPLETED | OUTPATIENT
Start: 2023-06-04 | End: 2023-06-04

## 2023-06-04 RX ADMIN — TRIAMCINOLONE ACETONIDE 40 MG: 40 INJECTION, SUSPENSION INTRA-ARTICULAR; INTRAMUSCULAR at 17:50

## 2023-06-04 RX ADMIN — LIDOCAINE HYDROCHLORIDE 1 ML: 10 INJECTION, SOLUTION EPIDURAL; INFILTRATION; INTRACAUDAL; PERINEURAL at 17:50

## 2023-08-08 RX ORDER — PRAMIPEXOLE DIHYDROCHLORIDE 0.5 MG/1
1 TABLET ORAL NIGHTLY
Qty: 180 TABLET | Refills: 2 | Status: SHIPPED | OUTPATIENT
Start: 2023-08-08

## 2023-09-14 PROCEDURE — 93010 ELECTROCARDIOGRAM REPORT: CPT | Performed by: INTERNAL MEDICINE

## 2023-09-15 ENCOUNTER — OFFICE VISIT (OUTPATIENT)
Dept: FAMILY MEDICINE CLINIC | Facility: CLINIC | Age: 83
End: 2023-09-15
Payer: MEDICARE

## 2023-09-15 ENCOUNTER — LAB (OUTPATIENT)
Dept: LAB | Facility: HOSPITAL | Age: 83
End: 2023-09-15
Payer: MEDICARE

## 2023-09-15 VITALS
HEART RATE: 85 BPM | BODY MASS INDEX: 31.24 KG/M2 | DIASTOLIC BLOOD PRESSURE: 72 MMHG | WEIGHT: 187.5 LBS | HEIGHT: 65 IN | TEMPERATURE: 97.3 F | OXYGEN SATURATION: 94 % | SYSTOLIC BLOOD PRESSURE: 132 MMHG

## 2023-09-15 DIAGNOSIS — R53.83 FATIGUE, UNSPECIFIED TYPE: Primary | ICD-10-CM

## 2023-09-15 DIAGNOSIS — H10.12 ALLERGIC CONJUNCTIVITIS OF LEFT EYE: ICD-10-CM

## 2023-09-15 LAB
QT INTERVAL: 386 MS
QTC INTERVAL: 422 MS

## 2023-09-15 PROCEDURE — 99213 OFFICE O/P EST LOW 20 MIN: CPT | Performed by: NURSE PRACTITIONER

## 2023-09-15 PROCEDURE — 85027 COMPLETE CBC AUTOMATED: CPT | Performed by: NURSE PRACTITIONER

## 2023-09-15 PROCEDURE — 84443 ASSAY THYROID STIM HORMONE: CPT | Performed by: NURSE PRACTITIONER

## 2023-09-15 PROCEDURE — 93005 ELECTROCARDIOGRAM TRACING: CPT | Performed by: NURSE PRACTITIONER

## 2023-09-15 PROCEDURE — 82607 VITAMIN B-12: CPT | Performed by: NURSE PRACTITIONER

## 2023-09-15 PROCEDURE — 1159F MED LIST DOCD IN RCRD: CPT | Performed by: NURSE PRACTITIONER

## 2023-09-15 PROCEDURE — 1160F RVW MEDS BY RX/DR IN RCRD: CPT | Performed by: NURSE PRACTITIONER

## 2023-09-15 PROCEDURE — 80053 COMPREHEN METABOLIC PANEL: CPT | Performed by: NURSE PRACTITIONER

## 2023-09-15 RX ORDER — OLOPATADINE HYDROCHLORIDE 1 MG/ML
1 SOLUTION/ DROPS OPHTHALMIC 2 TIMES DAILY PRN
Qty: 5 ML | Refills: 0 | Status: SHIPPED | OUTPATIENT
Start: 2023-09-15

## 2023-09-15 NOTE — PROGRESS NOTES
"Subjective   Sara Solis is a 82 y.o. female. Fatigue    History of Present Illness   Patient presents today for fatigue. Presents today for fatigue. She says this problem started 3-4 weeks ago. Pt also reports joint pain in her knees, left elbow, and low back pain. Takes pain medication sparingly. Sleeps okay. Has to sleep in recliner  due to back pain. Reports left eye itching.     The following portions of the patient's history were reviewed and updated as appropriate: allergies, current medications, past family history, past medical history, past social history, past surgical history, and problem list.      Vitals:    09/15/23 1002   BP: 132/72   BP Location: Left arm   Patient Position: Sitting   Cuff Size: Large Adult   Pulse: 85   Temp: 97.3 °F (36.3 °C)   TempSrc: Infrared   SpO2: 94%   Weight: 85 kg (187 lb 8 oz)   Height: 165.1 cm (65\")       Body mass index is 31.2 kg/m².    Objective   Physical Exam  Vitals and nursing note reviewed.   Constitutional:       General: She is not in acute distress.     Appearance: She is well-developed. She is not ill-appearing.   HENT:      Head: Normocephalic.      Right Ear: Hearing, tympanic membrane, ear canal and external ear normal.      Left Ear: Hearing, tympanic membrane, ear canal and external ear normal.      Nose: Nose normal.      Mouth/Throat:      Lips: Pink.      Mouth: Mucous membranes are moist.      Pharynx: Oropharynx is clear. Uvula midline. No oropharyngeal exudate.   Eyes:      General: Lids are normal. Gaze aligned appropriately.      Conjunctiva/sclera: Conjunctivae normal.      Pupils: Pupils are equal, round, and reactive to light.   Neck:      Thyroid: No thyroid mass, thyromegaly or thyroid tenderness.   Cardiovascular:      Rate and Rhythm: Normal rate and regular rhythm.      Heart sounds: Normal heart sounds, S1 normal and S2 normal. No murmur heard.  Pulmonary:      Effort: Pulmonary effort is normal.      Breath sounds: Normal " breath sounds and air entry. No decreased breath sounds, wheezing, rhonchi or rales.   Abdominal:      General: Abdomen is flat. Bowel sounds are normal.      Palpations: Abdomen is soft.      Tenderness: There is no abdominal tenderness.   Musculoskeletal:         General: Normal range of motion.      Cervical back: Full passive range of motion without pain, normal range of motion and neck supple.   Lymphadenopathy:      Cervical: No cervical adenopathy.   Skin:     General: Skin is warm and dry.   Neurological:      General: No focal deficit present.      Mental Status: She is alert and oriented to person, place, and time.      Coordination: Coordination is intact.      Gait: Gait is intact.   Psychiatric:         Attention and Perception: Attention normal.         Mood and Affect: Mood normal.         Speech: Speech normal.         Behavior: Behavior normal. Behavior is cooperative.         Thought Content: Thought content normal.         Cognition and Memory: Cognition normal.         Judgment: Judgment normal.         Assessment & Plan   Diagnoses and all orders for this visit:    1. Fatigue, unspecified type (Primary)  -     CBC (No Diff)  -     Comprehensive metabolic panel  -     TSH  -     Vitamin B12  -     ECG 12 Lead    2. Allergic conjunctivitis of left eye  -     olopatadine (PATANOL) 0.1 % ophthalmic solution; Administer 1 drop into the left eye 2 (Two) Times a Day As Needed (itching).  Dispense: 5 mL; Refill: 0      EKG showed NSR, no acute changes.  Physical exam unremarkable.  Labs ordered to further evaluate fatigue.  Use patanol 1 drop into left eye BID PRN for itching.  If symptoms do not improve or worsen, patient was instructed to return to clinic for further evaluation.                This document has been electronically signed by DEBORAH Garg on  September 15, 2023 10:30 CDT

## 2023-09-18 LAB
ALBUMIN SERPL-MCNC: 4.2 G/DL (ref 3.5–5.2)
ALBUMIN/GLOB SERPL: 1.7 G/DL
ALP SERPL-CCNC: 71 U/L (ref 39–117)
ALT SERPL W P-5'-P-CCNC: 17 U/L (ref 1–33)
ANION GAP SERPL CALCULATED.3IONS-SCNC: 11 MMOL/L (ref 5–15)
AST SERPL-CCNC: 20 U/L (ref 1–32)
BILIRUB SERPL-MCNC: 0.2 MG/DL (ref 0–1.2)
BUN SERPL-MCNC: 21 MG/DL (ref 8–23)
BUN/CREAT SERPL: 30.9 (ref 7–25)
CALCIUM SPEC-SCNC: 8.8 MG/DL (ref 8.6–10.5)
CHLORIDE SERPL-SCNC: 108 MMOL/L (ref 98–107)
CO2 SERPL-SCNC: 26 MMOL/L (ref 22–29)
CREAT SERPL-MCNC: 0.68 MG/DL (ref 0.57–1)
DEPRECATED RDW RBC AUTO: 39.5 FL (ref 37–54)
EGFRCR SERPLBLD CKD-EPI 2021: 87.1 ML/MIN/1.73
ERYTHROCYTE [DISTWIDTH] IN BLOOD BY AUTOMATED COUNT: 12.5 % (ref 12.3–15.4)
GLOBULIN UR ELPH-MCNC: 2.5 GM/DL
GLUCOSE SERPL-MCNC: 129 MG/DL (ref 65–99)
HCT VFR BLD AUTO: 37.5 % (ref 34–46.6)
HGB BLD-MCNC: 12.5 G/DL (ref 12–15.9)
MCH RBC QN AUTO: 28.9 PG (ref 26.6–33)
MCHC RBC AUTO-ENTMCNC: 33.3 G/DL (ref 31.5–35.7)
MCV RBC AUTO: 86.6 FL (ref 79–97)
PLATELET # BLD AUTO: 229 10*3/MM3 (ref 140–450)
PMV BLD AUTO: 10.9 FL (ref 6–12)
POTASSIUM SERPL-SCNC: 4.5 MMOL/L (ref 3.5–5.2)
PROT SERPL-MCNC: 6.7 G/DL (ref 6–8.5)
RBC # BLD AUTO: 4.33 10*6/MM3 (ref 3.77–5.28)
SODIUM SERPL-SCNC: 145 MMOL/L (ref 136–145)
TSH SERPL DL<=0.05 MIU/L-ACNC: 3.93 UIU/ML (ref 0.27–4.2)
VIT B12 BLD-MCNC: 682 PG/ML (ref 211–946)
WBC NRBC COR # BLD: 7.85 10*3/MM3 (ref 3.4–10.8)

## 2023-09-19 DIAGNOSIS — R73.9 HYPERGLYCEMIA: Primary | ICD-10-CM

## 2023-09-29 ENCOUNTER — LAB (OUTPATIENT)
Dept: LAB | Facility: HOSPITAL | Age: 83
End: 2023-09-29
Payer: MEDICARE

## 2023-09-29 LAB — HBA1C MFR BLD: 6.3 % (ref 4.8–5.6)

## 2023-09-29 PROCEDURE — 83036 HEMOGLOBIN GLYCOSYLATED A1C: CPT | Performed by: NURSE PRACTITIONER

## (undated) DEVICE — PK HIP LF 60

## (undated) DEVICE — GLV SURG SENSICARE GREEN W/ALOE PF LF 8 STRL

## (undated) DEVICE — STERILE POLYISOPRENE POWDER-FREE SURGICAL GLOVES WITH EMOLLIENT COATING: Brand: PROTEXIS

## (undated) DEVICE — TAPE MICROFM 2IN LF

## (undated) DEVICE — SOL IRR NACL 0.9PCT BT 1000ML

## (undated) DEVICE — GW THRD TROC/PT 2.8X300MM

## (undated) DEVICE — STPLR SKIN VISISTAT WD 35CT

## (undated) DEVICE — DRSNG GZ CURAD XEROFORM NONADHS 5X9IN STRL

## (undated) DEVICE — GLV SURG SENSICARE ALOE LF PF SZ7.5 GRN

## (undated) DEVICE — GLV SURG TRIUMPH ORTHO W/ALOE PF LTX 7.0

## (undated) DEVICE — SUT ETHLN 2/0 FS 18IN 664H

## (undated) DEVICE — UNDRPD BREATH 23X36 BG/10

## (undated) DEVICE — SPNG GZ WOVN 4X4IN 12PLY 10/BX STRL

## (undated) DEVICE — TOWEL,OR,DSP,ST,BLUE,DLX,8/PK,10PK/CS: Brand: MEDLINE

## (undated) DEVICE — Device

## (undated) DEVICE — APPL CHLORAPREP W/TINT 26ML ORNG

## (undated) DEVICE — GLV SURG SENSICARE MICRO PF LF 7.5 STRL

## (undated) DEVICE — TP ELAS ELASTIKON ADHS 4IN 2.5YD